# Patient Record
Sex: MALE | Race: WHITE | NOT HISPANIC OR LATINO | Employment: FULL TIME | URBAN - NONMETROPOLITAN AREA
[De-identification: names, ages, dates, MRNs, and addresses within clinical notes are randomized per-mention and may not be internally consistent; named-entity substitution may affect disease eponyms.]

---

## 2017-07-21 ENCOUNTER — OFFICE VISIT (OUTPATIENT)
Dept: CARDIOLOGY | Facility: CLINIC | Age: 57
End: 2017-07-21

## 2017-07-21 DIAGNOSIS — I25.10 CORONARY ARTERY DISEASE INVOLVING NATIVE CORONARY ARTERY OF NATIVE HEART WITHOUT ANGINA PECTORIS: ICD-10-CM

## 2017-07-21 DIAGNOSIS — I10 ESSENTIAL HYPERTENSION: Primary | ICD-10-CM

## 2017-07-21 PROCEDURE — 99213 OFFICE O/P EST LOW 20 MIN: CPT | Performed by: INTERNAL MEDICINE

## 2017-07-21 RX ORDER — FAMOTIDINE 40 MG/1
40 TABLET, FILM COATED ORAL 2 TIMES DAILY
COMMUNITY
End: 2021-12-27 | Stop reason: SDUPTHER

## 2017-07-21 RX ORDER — ERGOCALCIFEROL 1.25 MG/1
50000 CAPSULE ORAL WEEKLY
COMMUNITY
End: 2020-02-24

## 2017-07-21 RX ORDER — ROSUVASTATIN CALCIUM 20 MG/1
20 TABLET, COATED ORAL DAILY
COMMUNITY
End: 2018-07-16 | Stop reason: SDUPTHER

## 2017-07-21 NOTE — PROGRESS NOTES
Subjective:     Encounter Date:07/21/2017      Patient ID: Guy Sheth is a 57 y.o. male.    Chief Complaint:Coronary artery disease  HPI  This is a 57-year-old male patient who is followed chronically for hypertension and chronic coronary artery disease.  Since his last visit he has had no recurrence in chest discomfort or shortness of breath.  He has increased his overall physical activity levels and has been hiking on a regular basis with his wife.  He has no exertional chest arm neck jaw shoulder or back discomfort.  There is no shortness of breath at rest or with activity.  He has no orthopnea PND or lower extremity edema.  There is no dizziness palpitations or syncope.  The patient reports that he will occasionally get swelling in his feet and ankles if she's been on his feet for prolonged periods of time.  He remains a nonsmoker.  The remainder of his past medical history, family history and social history are unchanged compared to his last visit.  The following portions of the patient's history were reviewed and updated as appropriate: allergies, current medications, past family history, past medical history, past social history, past surgical history and problem  Review of Systems   Constitution: Negative for chills, diaphoresis, fever, weakness, malaise/fatigue, night sweats, weight gain and weight loss.   HENT: Negative for ear discharge, hearing loss, hoarse voice and nosebleeds.    Eyes: Negative for discharge, double vision, pain and photophobia.   Cardiovascular: Positive for leg swelling. Negative for chest pain, claudication, cyanosis, dyspnea on exertion, irregular heartbeat, near-syncope, orthopnea, palpitations, paroxysmal nocturnal dyspnea and syncope.   Respiratory: Negative for cough, hemoptysis, shortness of breath, sputum production and wheezing.    Endocrine: Negative for cold intolerance, heat intolerance, polydipsia, polyphagia and polyuria.   Hematologic/Lymphatic: Negative for  adenopathy and bleeding problem. Does not bruise/bleed easily.   Skin: Negative for color change, flushing, itching and rash.   Musculoskeletal: Negative for muscle cramps, muscle weakness, myalgias and stiffness.   Gastrointestinal: Negative for abdominal pain, diarrhea, hematemesis, hematochezia, nausea and vomiting.   Genitourinary: Negative for dysuria, frequency and nocturia.   Neurological: Negative for dizziness, focal weakness, light-headedness, loss of balance, numbness, paresthesias and seizures.   Psychiatric/Behavioral: Negative for altered mental status, hallucinations and suicidal ideas.   Allergic/Immunologic: Negative for HIV exposure, hives and persistent infections.       Current Outpatient Prescriptions:   •  ALPRAZolam (XANAX) 1 MG tablet, Take 1 mg by mouth 2 (Two) Times a Day As Needed for anxiety., Disp: , Rfl:   •  aspirin  MG tablet, Take 325 mg by mouth Daily., Disp: , Rfl:   •  famotidine (PEPCID) 40 MG tablet, Take 40 mg by mouth 2 (Two) Times a Day., Disp: , Rfl:   •  fluticasone (FLONASE) 50 MCG/ACT nasal spray, 2 sprays into each nostril Daily., Disp: , Rfl:   •  lisinopril (PRINIVIL,ZESTRIL) 10 MG tablet, Take 10 mg by mouth 2 (Two) Times a Day., Disp: , Rfl:   •  metaxalone (SKELAXIN) 800 MG tablet, Take 800 mg by mouth As Needed for muscle spasms., Disp: , Rfl:   •  metFORMIN (GLUCOPHAGE) 500 MG tablet, Take 500 mg by mouth 2 (Two) Times a Day With Meals., Disp: , Rfl:   •  NIFEdipine XL (PROCARDIA XL) 30 MG 24 hr tablet, Take 30 mg by mouth Daily., Disp: , Rfl:   •  pantoprazole (PROTONIX) 40 MG EC tablet, Take 40 mg by mouth Daily., Disp: , Rfl:   •  rosuvastatin (CRESTOR) 20 MG tablet, Take 20 mg by mouth Daily., Disp: , Rfl:   •  vitamin D (ERGOCALCIFEROL) 57408 UNITS capsule capsule, Take 50,000 Units by mouth 1 (One) Time Per Week., Disp: , Rfl:      Objective:     Physical Exam   Constitutional: He is oriented to person, place, and time. He appears well-developed and  well-nourished.   HENT:   Head: Normocephalic and atraumatic.   Eyes: Conjunctivae are normal. No scleral icterus.   Cardiovascular: Normal rate, regular rhythm, normal heart sounds and intact distal pulses.  Exam reveals no gallop and no friction rub.    No murmur heard.  Pulmonary/Chest: Effort normal and breath sounds normal. No respiratory distress.   Abdominal: Soft. Bowel sounds are normal. There is no tenderness.   Musculoskeletal: He exhibits no edema.   Neurological: He is alert and oriented to person, place, and time.   Skin: Skin is warm and dry.   Psychiatric: He has a normal mood and affect. His behavior is normal.     There were no vitals taken for this visit.   Lab Review:       Assessment:         1. Essential hypertension  Blood pressure control is excellent.    2. Coronary artery disease involving native coronary artery of native heart without angina pectoris  Stable and angina free.  Procedures     Plan:       No changes in his medication profile is indicated at this time.  No additional cardiovascular testing is warranted at this point.  The patient has been congratulated on his increased physical activity and is encouraged to continue an active lifestyle.

## 2017-08-25 ENCOUNTER — TELEPHONE (OUTPATIENT)
Dept: CARDIOLOGY | Facility: CLINIC | Age: 57
End: 2017-08-25

## 2017-08-25 NOTE — TELEPHONE ENCOUNTER
Patient seen Dr. Jay Bee today and he states that  Rohit told him that he should be on procardia because it is to dangerous.  Patient is concerned. Patient would like to talk to you about it.

## 2018-01-26 ENCOUNTER — OFFICE VISIT (OUTPATIENT)
Dept: CARDIOLOGY | Facility: CLINIC | Age: 58
End: 2018-01-26

## 2018-01-26 VITALS
BODY MASS INDEX: 30.61 KG/M2 | DIASTOLIC BLOOD PRESSURE: 88 MMHG | OXYGEN SATURATION: 97 % | WEIGHT: 226 LBS | SYSTOLIC BLOOD PRESSURE: 132 MMHG | HEIGHT: 72 IN | HEART RATE: 80 BPM

## 2018-01-26 DIAGNOSIS — I25.10 CORONARY ARTERY DISEASE INVOLVING NATIVE CORONARY ARTERY OF NATIVE HEART WITHOUT ANGINA PECTORIS: Primary | ICD-10-CM

## 2018-01-26 DIAGNOSIS — I10 ESSENTIAL HYPERTENSION: ICD-10-CM

## 2018-01-26 PROCEDURE — 99214 OFFICE O/P EST MOD 30 MIN: CPT | Performed by: INTERNAL MEDICINE

## 2018-01-26 RX ORDER — TEMAZEPAM 15 MG/1
15 CAPSULE ORAL
COMMUNITY
Start: 2018-01-19

## 2018-01-26 NOTE — PROGRESS NOTES
Subjective:     Encounter Date:01/26/2018      Patient ID: Guy Sheth is a 57 y.o. male.    Chief Complaint:Coronary artery disease  HPI  This is a 57-year-old male patient with known coronary artery disease who presents to cardiology clinic for routine follow-up.  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  He has had no recurrent chest discomfort at rest or with activity.  He has no shortness of breath at rest or with activity.  There is no orthopnea PND or lower extremity edema.  There is no palpitations or syncope.  He remains a nonsmoker.  The following portions of the patient's history were reviewed and updated as appropriate: allergies, current medications, past family history, past medical history, past social history, past surgical history and problem  Review of Systems   Constitution: Negative. Negative for chills, diaphoresis, fever, weakness, malaise/fatigue, night sweats, weight gain and weight loss.   HENT: Negative.  Negative for ear discharge, hearing loss, hoarse voice and nosebleeds.    Eyes: Negative.  Negative for discharge, double vision, pain and photophobia.   Cardiovascular: Negative.  Negative for chest pain, claudication, cyanosis, dyspnea on exertion, irregular heartbeat, leg swelling, near-syncope, orthopnea, palpitations, paroxysmal nocturnal dyspnea and syncope.   Respiratory: Negative.  Negative for cough, hemoptysis, shortness of breath, sputum production and wheezing.    Endocrine: Negative.  Negative for cold intolerance, heat intolerance, polydipsia, polyphagia and polyuria.   Hematologic/Lymphatic: Negative.  Negative for adenopathy and bleeding problem. Does not bruise/bleed easily.   Skin: Negative.  Negative for color change, flushing, itching and rash.   Musculoskeletal: Positive for joint pain and joint swelling. Negative for muscle cramps, muscle weakness, myalgias and stiffness.    Gastrointestinal: Negative.  Negative for abdominal pain, diarrhea, hematemesis, hematochezia, nausea and vomiting.   Genitourinary: Negative.  Negative for dysuria, frequency and nocturia.   Neurological: Positive for dizziness. Negative for focal weakness, loss of balance, numbness, paresthesias and seizures.   Psychiatric/Behavioral: Negative for altered mental status, hallucinations and suicidal ideas.   Allergic/Immunologic: Negative for HIV exposure, hives and persistent infections.       Current Outpatient Prescriptions:   •  aspirin  MG tablet, Take 325 mg by mouth Daily., Disp: , Rfl:   •  famotidine (PEPCID) 40 MG tablet, Take 40 mg by mouth 2 (Two) Times a Day., Disp: , Rfl:   •  fluticasone (FLONASE) 50 MCG/ACT nasal spray, 2 sprays into each nostril Daily., Disp: , Rfl:   •  lisinopril (PRINIVIL,ZESTRIL) 10 MG tablet, Take 20 mg by mouth 2 (Two) Times a Day., Disp: , Rfl:   •  metaxalone (SKELAXIN) 800 MG tablet, Take 800 mg by mouth As Needed for muscle spasms., Disp: , Rfl:   •  metFORMIN (GLUCOPHAGE) 500 MG tablet, Take 500 mg by mouth 2 (Two) Times a Day With Meals., Disp: , Rfl:   •  NIFEdipine XL (PROCARDIA XL) 30 MG 24 hr tablet, Take 30 mg by mouth Daily., Disp: , Rfl:   •  pantoprazole (PROTONIX) 40 MG EC tablet, Take 40 mg by mouth Daily., Disp: , Rfl:   •  rosuvastatin (CRESTOR) 20 MG tablet, Take 20 mg by mouth Daily., Disp: , Rfl:   •  temazepam (RESTORIL) 15 MG capsule, Take 15 mg by mouth 2 (Two) Times a Day., Disp: , Rfl:   •  vitamin D (ERGOCALCIFEROL) 38016 UNITS capsule capsule, Take 50,000 Units by mouth 1 (One) Time Per Week., Disp: , Rfl:      Objective:     Physical Exam   Constitutional: He is oriented to person, place, and time. He appears well-developed and well-nourished.   HENT:   Head: Normocephalic and atraumatic.   Eyes: Conjunctivae are normal. No scleral icterus.   Cardiovascular: Normal rate, regular rhythm, normal heart sounds and intact distal pulses.  Exam  "reveals no gallop and no friction rub.    No murmur heard.  Pulmonary/Chest: Effort normal and breath sounds normal. No respiratory distress.   Abdominal: Soft. Bowel sounds are normal. There is no tenderness.   Musculoskeletal: He exhibits no edema.   Neurological: He is alert and oriented to person, place, and time.   Skin: Skin is warm and dry.   Psychiatric: He has a normal mood and affect. His behavior is normal.     Blood pressure 132/88, pulse 80, height 182.9 cm (72.01\"), weight 103 kg (226 lb), SpO2 97 %.   Lab Review:       Assessment:         Coronary artery disease-native vessels.  Without angina pectoris.  The patient remained stable and angina free.    Essential hypertension.  Excellent blood pressure control.  The patient's primary care doctor has raised concerns about using long-acting nifedipine, implying that it was not a safe medication.  Procedures     Plan:       I have reassured the patient that Procardia XL is a safe medication.  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  No changes in his medication therapy are indicated at this time.  The patient is encouraged to continue his active lifestyle.         "

## 2018-02-21 ENCOUNTER — TELEPHONE (OUTPATIENT)
Dept: CARDIOLOGY | Facility: CLINIC | Age: 58
End: 2018-02-21

## 2018-02-21 NOTE — TELEPHONE ENCOUNTER
Wife states that patient went to see the reumotologist and was prescribed Prednisone and Leflunomide. Wife states patient's bp was running 160/111. Patient stopped taking both of these medication and taking an extra Lisinopril, bp is coming down to 147/99. Took 2 extra Lisinorpil on Sunday. Wife wanted to know what else to do.

## 2018-03-02 ENCOUNTER — OFFICE VISIT (OUTPATIENT)
Dept: CARDIOLOGY | Facility: CLINIC | Age: 58
End: 2018-03-02

## 2018-03-02 VITALS
DIASTOLIC BLOOD PRESSURE: 100 MMHG | HEART RATE: 79 BPM | BODY MASS INDEX: 30.2 KG/M2 | SYSTOLIC BLOOD PRESSURE: 150 MMHG | OXYGEN SATURATION: 99 % | HEIGHT: 72 IN | WEIGHT: 223 LBS

## 2018-03-02 DIAGNOSIS — I25.10 CORONARY ARTERY DISEASE INVOLVING NATIVE CORONARY ARTERY OF NATIVE HEART WITHOUT ANGINA PECTORIS: Primary | ICD-10-CM

## 2018-03-02 DIAGNOSIS — I10 ESSENTIAL HYPERTENSION: ICD-10-CM

## 2018-03-02 PROCEDURE — 99213 OFFICE O/P EST LOW 20 MIN: CPT | Performed by: INTERNAL MEDICINE

## 2018-03-02 RX ORDER — ASPIRIN 325 MG
1 TABLET ORAL DAILY
COMMUNITY
Start: 2014-06-13 | End: 2018-04-16

## 2018-03-02 RX ORDER — OLMESARTAN MEDOXOMIL / AMLODIPINE BESYLATE / HYDROCHLOROTHIAZIDE 40; 10; 12.5 MG/1; MG/1; MG/1
40 TABLET, FILM COATED ORAL DAILY
Qty: 30 TABLET | Refills: 11 | Status: SHIPPED | OUTPATIENT
Start: 2018-03-02 | End: 2018-04-16 | Stop reason: DRUGHIGH

## 2018-03-02 RX ORDER — LISINOPRIL 20 MG/1
1 TABLET ORAL 2 TIMES DAILY
COMMUNITY
Start: 2018-02-23 | End: 2018-03-02 | Stop reason: ALTCHOICE

## 2018-03-02 RX ORDER — IBUPROFEN 600 MG/1
1 TABLET ORAL DAILY PRN
COMMUNITY
Start: 2017-12-01 | End: 2019-01-11 | Stop reason: ALTCHOICE

## 2018-03-02 RX ORDER — AZELASTINE HYDROCHLORIDE 0.5 MG/ML
SOLUTION/ DROPS OPHTHALMIC
COMMUNITY
Start: 2018-01-20

## 2018-03-02 RX ORDER — LEFLUNOMIDE 20 MG/1
1 TABLET ORAL DAILY
COMMUNITY
Start: 2018-02-01 | End: 2020-02-24

## 2018-03-02 RX ORDER — NIFEDIPINE 30 MG/1
1 TABLET, EXTENDED RELEASE ORAL DAILY
COMMUNITY
Start: 2014-06-13 | End: 2018-03-02 | Stop reason: ALTCHOICE

## 2018-03-02 RX ORDER — METFORMIN HYDROCHLORIDE 500 MG/1
1 TABLET, EXTENDED RELEASE ORAL DAILY PRN
COMMUNITY
Start: 2018-01-11

## 2018-03-02 RX ORDER — PREDNISONE 1 MG/1
1 TABLET ORAL DAILY
COMMUNITY
Start: 2018-02-01 | End: 2018-03-02

## 2018-03-02 RX ORDER — FLUTICASONE PROPIONATE 50 MCG
1 SPRAY, SUSPENSION (ML) NASAL DAILY PRN
COMMUNITY
Start: 2014-06-13 | End: 2018-04-16

## 2018-03-02 RX ORDER — TOBRAMYCIN AND DEXAMETHASONE 3; 1 MG/ML; MG/ML
1 SUSPENSION/ DROPS OPHTHALMIC AS NEEDED
COMMUNITY
Start: 2018-01-30 | End: 2020-02-24

## 2018-03-02 NOTE — PROGRESS NOTES
Subjective:     Encounter Date:03/02/2018      Patient ID: Guy Sheth is a 58 y.o. male.    Chief Complaint:Hypertension  HPI  This is a 58-year-old male patient who has had poor blood pressure control after starting a new medication for rheumatoid arthritis- Arava, which has the side effect of hypertension.  The patient also was on a two-week course of prednisone which as made his blood pressure difficult to control.  He has no chest discomfort at rest or with activity.  There is no orthopnea PND or lower extremity edema.  There is no dizziness palpitations or syncope.  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  Wife feels that he has been anxious and nervous and agitated and that this is contributing to his elevated blood pressure.  The following portions of the patient's history were reviewed and updated as appropriate: allergies, current medications, past family history, past medical history, past social history, past surgical history and problem  Review of Systems   Constitution: Negative for chills, diaphoresis, fever, weakness, malaise/fatigue, night sweats, weight gain and weight loss.   HENT: Negative for ear discharge, hearing loss, hoarse voice and nosebleeds.    Eyes: Negative for discharge, double vision, pain and photophobia.   Cardiovascular: Negative for chest pain, claudication, cyanosis, dyspnea on exertion, irregular heartbeat, leg swelling, near-syncope, orthopnea, palpitations, paroxysmal nocturnal dyspnea and syncope.   Respiratory: Negative for cough, hemoptysis, shortness of breath, sputum production and wheezing.    Endocrine: Negative for cold intolerance, heat intolerance,  polydipsia, polyphagia and polyuria.   Hematologic/Lymphatic: Negative for adenopathy and bleeding problem. Does not bruise/bleed easily.   Skin: Negative for color change, flushing, itching and rash.   Musculoskeletal: Negative for muscle cramps, muscle weakness, myalgias and stiffness.   Gastrointestinal: Negative for abdominal pain, diarrhea, hematemesis, hematochezia, nausea and vomiting.   Genitourinary: Negative for dysuria, frequency and nocturia.   Neurological: Negative for focal weakness, loss of balance, numbness, paresthesias and seizures.   Psychiatric/Behavioral: Negative for altered mental status, hallucinations and suicidal ideas.   Allergic/Immunologic: Negative for HIV exposure, hives and persistent infections.     I have reviewed the patient's medication profile and discussed these with the patient.  I have reviewed his last round of cardiac tests.  I reviewed his laboratory data.      Current Outpatient Prescriptions:   •  aspirin 325 MG tablet, Take 1 tablet by mouth Daily., Disp: , Rfl:   •  aspirin  MG tablet, Take 325 mg by mouth Daily., Disp: , Rfl:   •  azelastine (OPTIVAR) 0.05 % ophthalmic solution, , Disp: , Rfl:   •  famotidine (PEPCID) 40 MG tablet, Take 40 mg by mouth 2 (Two) Times a Day., Disp: , Rfl:   •  fluticasone (FLONASE) 50 MCG/ACT nasal spray, 2 sprays into each nostril Daily., Disp: , Rfl:   •  fluticasone (FLONASE) 50 MCG/ACT nasal spray, 1 spray into each nostril Daily As Needed., Disp: , Rfl:   •  ibuprofen (ADVIL,MOTRIN) 600 MG tablet, Take 1 tablet by mouth Daily As Needed., Disp: , Rfl:   •  leflunomide (ARAVA) 20 MG tablet, Take 1 tablet by mouth Daily., Disp: , Rfl:   •  metaxalone (SKELAXIN) 800 MG tablet, Take 800 mg by mouth As Needed for muscle spasms., Disp: , Rfl:   •  metFORMIN (GLUCOPHAGE) 500 MG tablet, Take 500 mg by mouth 2 (Two) Times a Day With Meals., Disp: , Rfl:   •  metFORMIN ER (GLUCOPHAGE-XR) 500 MG 24 hr tablet, Take 1 tablet by mouth Daily  "As Needed., Disp: , Rfl:   •  pantoprazole (PROTONIX) 40 MG EC tablet, Take 40 mg by mouth Daily., Disp: , Rfl:   •  rosuvastatin (CRESTOR) 20 MG tablet, Take 20 mg by mouth Daily., Disp: , Rfl:   •  temazepam (RESTORIL) 15 MG capsule, Take 15 mg by mouth 2 (Two) Times a Day., Disp: , Rfl:   •  tobramycin-dexamethasone (TOBRADEX) 0.3-0.1 % ophthalmic suspension, Administer 1 drop to both eyes As Needed., Disp: , Rfl:   •  vitamin D (ERGOCALCIFEROL) 22398 UNITS capsule capsule, Take 50,000 Units by mouth 1 (One) Time Per Week., Disp: , Rfl:   •  Olmesartan-Amlodipine-HCTZ 40-10-12.5 MG tablet, Take 40 mg by mouth Daily., Disp: 30 tablet, Rfl: 11     Objective:     Physical Exam   Constitutional: He is oriented to person, place, and time. He appears well-developed and well-nourished.   HENT:   Head: Normocephalic and atraumatic.   Eyes: Conjunctivae are normal. No scleral icterus.   Cardiovascular: Normal rate, regular rhythm, normal heart sounds and intact distal pulses.  Exam reveals no gallop and no friction rub.    No murmur heard.  Pulmonary/Chest: Effort normal and breath sounds normal. No respiratory distress.   Abdominal: Soft. Bowel sounds are normal. There is no tenderness.   Musculoskeletal: He exhibits no edema.   Neurological: He is alert and oriented to person, place, and time.   Skin: Skin is warm and dry.   Psychiatric: He has a normal mood and affect. His behavior is normal.     Blood pressure 150/100, pulse 79, height 182.9 cm (72.01\"), weight 101 kg (223 lb), SpO2 99 %.   Lab Review:       Assessment:         1. Coronary artery disease involving native coronary artery of native heart without angina pectoris  Stable and angina free.    2. Essential hypertension  Poor blood pressure control.  I suspect there are a lot of secondary issues contributing to his elevated blood pressure.    Procedures     Plan:       I have recommended discontinuation of lisinopril as well as Procardia XL.  The patient has " been started on Tribenzor 40-10-12.5 mg orally once per day.  He has been given a coupon discount card as well.  This is valid for one year.  The patient has been counseled regarding the importance of managing his anxiety and emotions.  No additional cardiac testing is necessary from my standpoint.  The patient has been counseled regarding the importance of maintaining an active lifestyle.  The patient has been counseled regarding the importance of dietary sodium restriction.

## 2018-03-06 ENCOUNTER — TELEPHONE (OUTPATIENT)
Dept: CARDIOLOGY | Facility: CLINIC | Age: 58
End: 2018-03-06

## 2018-03-06 RX ORDER — BISOPROLOL FUMARATE 10 MG/1
10 TABLET, FILM COATED ORAL DAILY
Qty: 30 TABLET | Refills: 5 | Status: SHIPPED | OUTPATIENT
Start: 2018-03-06 | End: 2018-04-16

## 2018-03-06 NOTE — TELEPHONE ENCOUNTER
Patient states that he took the olmesartan and procardia last night and his bp went up to  142/101. T Last bp was 129/88 @6:52am.  Also hr was 102.  Please advise.

## 2018-03-27 ENCOUNTER — TELEPHONE (OUTPATIENT)
Dept: CARDIOLOGY | Facility: CLINIC | Age: 58
End: 2018-03-27

## 2018-03-27 NOTE — TELEPHONE ENCOUNTER
Patient was started on bisoprolol 10 mg daily on 03/06. He takes it at nights. States that his hr goes to down to 50 and when his bp is 148-153/90's every morning. Patient states it makes him feel like he's dragging.

## 2018-04-16 ENCOUNTER — OFFICE VISIT (OUTPATIENT)
Dept: CARDIOLOGY | Facility: CLINIC | Age: 58
End: 2018-04-16

## 2018-04-16 VITALS
HEIGHT: 72 IN | DIASTOLIC BLOOD PRESSURE: 76 MMHG | OXYGEN SATURATION: 99 % | SYSTOLIC BLOOD PRESSURE: 118 MMHG | WEIGHT: 228 LBS | HEART RATE: 80 BPM | BODY MASS INDEX: 30.88 KG/M2

## 2018-04-16 DIAGNOSIS — I10 ESSENTIAL HYPERTENSION: Primary | ICD-10-CM

## 2018-04-16 DIAGNOSIS — I25.10 CORONARY ARTERY DISEASE INVOLVING NATIVE CORONARY ARTERY OF NATIVE HEART WITHOUT ANGINA PECTORIS: ICD-10-CM

## 2018-04-16 PROCEDURE — 99213 OFFICE O/P EST LOW 20 MIN: CPT | Performed by: INTERNAL MEDICINE

## 2018-04-16 RX ORDER — OLMESARTAN MEDOXOMIL, AMLODIPINE AND HYDROCHLOROTHIAZIDE TABLET 20/5/12.5 MG 20; 5; 12.5 MG/1; MG/1; MG/1
1 TABLET ORAL DAILY
Qty: 30 TABLET | Refills: 11 | Status: SHIPPED | OUTPATIENT
Start: 2018-04-16 | End: 2018-05-07 | Stop reason: SDUPTHER

## 2018-04-16 RX ORDER — CLONIDINE HYDROCHLORIDE 0.1 MG/1
0.1 TABLET ORAL EVERY 8 HOURS PRN
Qty: 40 TABLET | Refills: 5 | Status: SHIPPED | OUTPATIENT
Start: 2018-04-16 | End: 2020-03-18 | Stop reason: SDUPTHER

## 2018-04-16 RX ORDER — CLONIDINE HYDROCHLORIDE 0.1 MG/1
0.1 TABLET ORAL EVERY 12 HOURS SCHEDULED
Status: CANCELLED | OUTPATIENT
Start: 2018-04-16

## 2018-04-16 RX ORDER — AMOXICILLIN 500 MG/1
2 CAPSULE ORAL DAILY
COMMUNITY
Start: 2018-04-09 | End: 2018-07-16

## 2018-04-16 NOTE — PROGRESS NOTES
Subjective:     Encounter Date:04/16/2018      Patient ID: Guy Sheth is a 58 y.o. male.    Chief Complaint:Hypertension  HPI  This is a 58-year-old male patient who recently had change in his medication.  The patient discontinued his lisinopril and Procardia XL.  In its place he was started on tribenzor with bisoprolol.  He discontinued the beta blocker several weeks ago due to side effects and symptomatic bradycardia.  His heart rate has been normal since then.  The patient has had to hold his Tribenzor for a few doses due to low blood pressure-90/60.  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  He is due to have a occupation-related treadmill stress test in July of this year.  He has no chest discomfort at rest or with activity.  There is no shortness of breath at rest or with activity.  There is no orthopnea PND or lower extremity edema.  He has no dizziness palpitations or syncope.  He remains a nonsmoker.  The following portions of the patient's history were reviewed and updated as appropriate: allergies, current medications, past family history, past medical history, past social history, past surgical history and problem  Review of Systems   Constitution: Negative for chills, diaphoresis, fever, weakness, malaise/fatigue, weight gain and weight loss.   HENT: Negative for ear discharge, hearing loss, hoarse voice and nosebleeds.    Eyes: Negative for discharge, double vision, pain and photophobia.   Cardiovascular: Negative for chest pain, claudication, cyanosis, dyspnea on exertion, irregular heartbeat, leg swelling, near-syncope, orthopnea, palpitations, paroxysmal nocturnal dyspnea and syncope.   Respiratory: Negative for cough, hemoptysis, shortness of breath, sputum production and wheezing.    Endocrine: Negative for cold intolerance, heat intolerance, polydipsia, polyphagia and polyuria.   Hematologic/Lymphatic: Negative for adenopathy and bleeding problem. Does  not bruise/bleed easily.   Skin: Negative for color change, flushing, itching and rash.   Musculoskeletal: Negative for muscle cramps, muscle weakness, myalgias and stiffness.   Gastrointestinal: Negative for abdominal pain, diarrhea, hematemesis, hematochezia, nausea and vomiting.   Genitourinary: Negative for dysuria, frequency and nocturia.   Neurological: Negative for focal weakness, loss of balance, numbness, paresthesias and seizures.   Psychiatric/Behavioral: Negative for altered mental status, hallucinations and suicidal ideas.   Allergic/Immunologic: Negative for HIV exposure, hives and persistent infections.           Current Outpatient Prescriptions:   •  amoxicillin (AMOXIL) 500 MG capsule, Take 2 capsules by mouth Daily., Disp: , Rfl:   •  aspirin  MG tablet, Take 325 mg by mouth Daily., Disp: , Rfl:   •  azelastine (OPTIVAR) 0.05 % ophthalmic solution, , Disp: , Rfl:   •  famotidine (PEPCID) 40 MG tablet, Take 40 mg by mouth 2 (Two) Times a Day., Disp: , Rfl:   •  ibuprofen (ADVIL,MOTRIN) 600 MG tablet, Take 1 tablet by mouth Daily As Needed., Disp: , Rfl:   •  leflunomide (ARAVA) 20 MG tablet, Take 1 tablet by mouth Daily., Disp: , Rfl:   •  metaxalone (SKELAXIN) 800 MG tablet, Take 800 mg by mouth As Needed for muscle spasms., Disp: , Rfl:   •  metFORMIN ER (GLUCOPHAGE-XR) 500 MG 24 hr tablet, Take 1 tablet by mouth Daily As Needed., Disp: , Rfl:   •  pantoprazole (PROTONIX) 40 MG EC tablet, Take 40 mg by mouth Daily., Disp: , Rfl:   •  rosuvastatin (CRESTOR) 20 MG tablet, Take 20 mg by mouth Daily., Disp: , Rfl:   •  temazepam (RESTORIL) 15 MG capsule, Take 15 mg by mouth 2 (Two) Times a Day., Disp: , Rfl:   •  tobramycin-dexamethasone (TOBRADEX) 0.3-0.1 % ophthalmic suspension, Administer 1 drop to both eyes As Needed., Disp: , Rfl:   •  vitamin D (ERGOCALCIFEROL) 73896 UNITS capsule capsule, Take 50,000 Units by mouth 1 (One) Time Per Week., Disp: , Rfl:      Objective:     Physical Exam  "  Constitutional: He is oriented to person, place, and time. He appears well-developed and well-nourished.   HENT:   Head: Normocephalic and atraumatic.   Eyes: Conjunctivae are normal. No scleral icterus.   Neck: No JVD present.   Cardiovascular: Normal rate, regular rhythm, normal heart sounds and intact distal pulses.  Exam reveals no gallop and no friction rub.    No murmur heard.  Pulmonary/Chest: Effort normal and breath sounds normal. No respiratory distress.   Abdominal: Soft. Bowel sounds are normal. There is no tenderness.   Musculoskeletal: He exhibits no edema.   Neurological: He is alert and oriented to person, place, and time.   Skin: Skin is warm and dry. No rash noted.   Psychiatric: He has a normal mood and affect. His behavior is normal.     Blood pressure 118/76, pulse 80, height 182.9 cm (72.01\"), weight 103 kg (228 lb), SpO2 99 %.   Lab Review:       Assessment:         1. Essential hypertension  Excellent blood pressure control.    2. Coronary artery disease involving native coronary artery of native heart without angina pectoris  Stable and angina free.    Procedures     Plan:       I have recommended decreasing the dose of his Tribenzor 20/5/12.5 orally once per day.  His beta blocker has been discontinued.  He has been given a prescription for clonidine 0.1 mg orally every 8 hours as needed for systolic blood pressure greater than 150 and/or a diastolic blood pressure greater than 90.  He is been counseled regarding the importance of dietary sodium restriction.  The importance of diet exercise and weight management have been reinforced with the patient.  He will follow-up in July as previously scheduled for his occupational mandated treadmill stress test.         "

## 2018-05-07 ENCOUNTER — TELEPHONE (OUTPATIENT)
Dept: CARDIOLOGY | Facility: CLINIC | Age: 58
End: 2018-05-07

## 2018-05-07 RX ORDER — OLMESARTAN MEDOXOMIL, AMLODIPINE AND HYDROCHLOROTHIAZIDE TABLET 20/5/12.5 MG 20; 5; 12.5 MG/1; MG/1; MG/1
1 TABLET ORAL 2 TIMES DAILY
Qty: 180 TABLET | Refills: 3 | Status: SHIPPED | OUTPATIENT
Start: 2018-05-07 | End: 2019-08-21 | Stop reason: SDUPTHER

## 2018-05-07 NOTE — TELEPHONE ENCOUNTER
Patient concerned about BP meds and how to take them. Patient had been using both Tribenzor doses 40-10-12.5 & 20-5-12.5 1 in am and 1 in pm but sometimes used the 20-5-12.5 bid instead. He took Clonidine this am for /93 before taking Tribenzor.  I spoke with Dr. Brambila and he states to either do Tribenzor 40-10-12.5 QD or Tribenzor 20-5-12.5 BID (12 hours apart) and Clonidine prn with /90 or >.  Patient will that Tribenzor BID for a more consistent BP readings.New Rx sent in to [facility]

## 2018-07-16 ENCOUNTER — OFFICE VISIT (OUTPATIENT)
Dept: CARDIOLOGY | Facility: CLINIC | Age: 58
End: 2018-07-16

## 2018-07-16 VITALS
WEIGHT: 226 LBS | RESPIRATION RATE: 18 BRPM | OXYGEN SATURATION: 100 % | BODY MASS INDEX: 30.61 KG/M2 | HEART RATE: 65 BPM | HEIGHT: 72 IN | DIASTOLIC BLOOD PRESSURE: 82 MMHG | SYSTOLIC BLOOD PRESSURE: 138 MMHG

## 2018-07-16 DIAGNOSIS — I25.10 CORONARY ARTERY DISEASE INVOLVING NATIVE CORONARY ARTERY OF NATIVE HEART WITHOUT ANGINA PECTORIS: ICD-10-CM

## 2018-07-16 DIAGNOSIS — I10 ESSENTIAL HYPERTENSION: Primary | ICD-10-CM

## 2018-07-16 LAB
BH CV STRESS BP STAGE 1: NORMAL
BH CV STRESS BP STAGE 2: NORMAL
BH CV STRESS BP STAGE 3: NORMAL
BH CV STRESS DURATION MIN STAGE 1: 3
BH CV STRESS DURATION MIN STAGE 2: 3
BH CV STRESS DURATION MIN STAGE 3: 3
BH CV STRESS DURATION MIN STAGE 4: 3
BH CV STRESS DURATION SEC STAGE 1: 0
BH CV STRESS DURATION SEC STAGE 2: 0
BH CV STRESS DURATION SEC STAGE 3: 0
BH CV STRESS DURATION SEC STAGE 4: 0
BH CV STRESS GRADE STAGE 1: 10
BH CV STRESS GRADE STAGE 2: 12
BH CV STRESS GRADE STAGE 3: 14
BH CV STRESS GRADE STAGE 4: 16
BH CV STRESS HR STAGE 1: 114
BH CV STRESS HR STAGE 2: 131
BH CV STRESS HR STAGE 3: 161
BH CV STRESS HR STAGE 4: 165
BH CV STRESS METS STAGE 1: 5
BH CV STRESS METS STAGE 2: 7.5
BH CV STRESS METS STAGE 3: 10
BH CV STRESS METS STAGE 4: 13.5
BH CV STRESS O2 STAGE 1: 98
BH CV STRESS O2 STAGE 2: 99
BH CV STRESS O2 STAGE 3: 99
BH CV STRESS O2 STAGE 4: 98
BH CV STRESS PROTOCOL 1: NORMAL
BH CV STRESS RECOVERY BP: NORMAL MMHG
BH CV STRESS RECOVERY HR: 103 BPM
BH CV STRESS RECOVERY O2: 99 %
BH CV STRESS SPEED STAGE 1: 1.7
BH CV STRESS SPEED STAGE 2: 2.5
BH CV STRESS SPEED STAGE 3: 3.4
BH CV STRESS SPEED STAGE 4: 4.2
BH CV STRESS STAGE 1: 1
BH CV STRESS STAGE 2: 2
BH CV STRESS STAGE 3: 3
BH CV STRESS STAGE 4: 4
MAXIMAL PREDICTED HEART RATE: 162 BPM
PERCENT MAX PREDICTED HR: 101.85 %
STRESS BASELINE BP: NORMAL MMHG
STRESS BASELINE HR: 65 BPM
STRESS O2 SAT REST: 100 %
STRESS PERCENT HR: 120 %
STRESS POST ESTIMATED WORKLOAD: 10.1 METS
STRESS POST EXERCISE DUR MIN: 9 MIN
STRESS POST O2 SAT PEAK: 99 %
STRESS POST PEAK BP: NORMAL MMHG
STRESS POST PEAK HR: 165 BPM
STRESS TARGET HR: 138 BPM

## 2018-07-16 PROCEDURE — 99213 OFFICE O/P EST LOW 20 MIN: CPT | Performed by: INTERNAL MEDICINE

## 2018-07-16 RX ORDER — ROSUVASTATIN CALCIUM 20 MG/1
20 TABLET, COATED ORAL DAILY
Qty: 90 TABLET | Refills: 4 | Status: SHIPPED | OUTPATIENT
Start: 2018-07-16 | End: 2019-09-19 | Stop reason: SDUPTHER

## 2018-07-16 NOTE — PROGRESS NOTES
Subjective:     Encounter Date:07/16/2018      Patient ID: Guy Sheth is a 58 y.o. male.    Chief Complaint:Coronary artery disease  HPI  This is a 58-year-old male patient who presents to cardiology clinic for routine follow-up.  The patient earlier today underwent a treadmill exercise stress test as part of his 's license requirement.  The patient exercised for over 9 minutes achieving his target heart rate without chest discomfort shortness of breath or ischemic EKG changes.  His heart rate and blood pressure response to exercise was normal and there was no exercise-induced arrhythmia.  He achieved greater than 10 metabolic equivalents of task.  He indicates that he has done very well since his last visit.  He indicates that he was previously tolerating her and name Crestor well without side effects.  However it was mandated to his insurance that he change to a generic form of Crestor.  This resulted in severe myalgias affecting the muscles of his arms and legs.  He indicates that he felt that the muscle was being torn from the bone.  The muscle pain was so intense that he had to discontinue taking cholesterol-lowering medication.  His myalgias resolved after discontinuation of the generic form of Crestor.  He requests brand-name Crestor.  The patient has no chest discomfort at rest or with activity.  There is no exertional chest arm neck jaw shoulder or back discomfort.  There is no orthopnea PND or lower extremity edema.  There is no dizziness palpitations or syncope.  He remains a nonsmoker.  The following portions of the patient's history were reviewed and updated as appropriate: allergies, current medications, past family history, past medical history, past social history, past surgical history and problem  Review of Systems   Constitution: Negative for chills, diaphoresis, fever, malaise/fatigue, weight gain and weight loss.   HENT: Negative for ear discharge, hearing loss, hoarse  voice and nosebleeds.    Eyes: Negative for discharge, double vision, pain and photophobia.   Cardiovascular: Negative for chest pain, claudication, cyanosis, dyspnea on exertion, irregular heartbeat, leg swelling, near-syncope, orthopnea, palpitations, paroxysmal nocturnal dyspnea and syncope.   Respiratory: Negative for cough, hemoptysis, shortness of breath, sputum production and wheezing.    Endocrine: Negative for cold intolerance, heat intolerance, polydipsia, polyphagia and polyuria.   Hematologic/Lymphatic: Negative for adenopathy and bleeding problem. Does not bruise/bleed easily.   Skin: Negative for color change, flushing, itching and rash.   Musculoskeletal: Positive for myalgias. Negative for muscle cramps, muscle weakness and stiffness.   Gastrointestinal: Negative for abdominal pain, diarrhea, hematemesis, hematochezia, nausea and vomiting.   Genitourinary: Negative for dysuria, frequency and nocturia.   Neurological: Negative for focal weakness, loss of balance, numbness, paresthesias and seizures.   Psychiatric/Behavioral: Negative for altered mental status, hallucinations and suicidal ideas.   Allergic/Immunologic: Negative for HIV exposure, hives and persistent infections.           Current Outpatient Prescriptions:   •  aspirin  MG tablet, Take 325 mg by mouth Daily., Disp: , Rfl:   •  azelastine (OPTIVAR) 0.05 % ophthalmic solution, , Disp: , Rfl:   •  CloNIDine (CATAPRES) 0.1 MG tablet, Take 1 tablet by mouth Every 8 (Eight) Hours As Needed for High Blood Pressure. SBP>150 MM/HG & OR DBP> 95 MM/HG, Disp: 40 tablet, Rfl: 5  •  famotidine (PEPCID) 40 MG tablet, Take 40 mg by mouth 2 (Two) Times a Day., Disp: , Rfl:   •  ibuprofen (ADVIL,MOTRIN) 600 MG tablet, Take 1 tablet by mouth Daily As Needed., Disp: , Rfl:   •  leflunomide (ARAVA) 20 MG tablet, Take 1 tablet by mouth Daily., Disp: , Rfl:   •  metaxalone (SKELAXIN) 800 MG tablet, Take 800 mg by mouth As Needed for muscle spasms., Disp:  , Rfl:   •  metFORMIN ER (GLUCOPHAGE-XR) 500 MG 24 hr tablet, Take 1 tablet by mouth Daily As Needed., Disp: , Rfl:   •  Olmesartan-Amlodipine-HCTZ 20-5-12.5 MG tablet, Take 1 tablet by mouth 2 (Two) Times a Day., Disp: 180 tablet, Rfl: 3  •  pantoprazole (PROTONIX) 40 MG EC tablet, Take 40 mg by mouth Daily., Disp: , Rfl:   •  rosuvastatin (CRESTOR) 20 MG tablet, Take 1 tablet by mouth Daily. Brand name only! Severe myalgia with generic, Disp: 90 tablet, Rfl: 4  •  temazepam (RESTORIL) 15 MG capsule, Take 15 mg by mouth 2 (Two) Times a Day., Disp: , Rfl:   •  tobramycin-dexamethasone (TOBRADEX) 0.3-0.1 % ophthalmic suspension, Administer 1 drop to both eyes As Needed., Disp: , Rfl:   •  vitamin D (ERGOCALCIFEROL) 72228 UNITS capsule capsule, Take 50,000 Units by mouth 1 (One) Time Per Week., Disp: , Rfl:      Objective:     Physical Exam   Constitutional: He is oriented to person, place, and time. He appears well-developed and well-nourished.   HENT:   Head: Normocephalic and atraumatic.   Mouth/Throat: Oropharynx is clear and moist.   Eyes: Conjunctivae and EOM are normal. Pupils are equal, round, and reactive to light. No scleral icterus.   Neck: Normal range of motion. Neck supple. No JVD present. No tracheal deviation present. No thyromegaly present.   Cardiovascular: Normal rate, regular rhythm, S1 normal, S2 normal, normal heart sounds, intact distal pulses and normal pulses.  PMI is not displaced.  Exam reveals no gallop and no friction rub.    No murmur heard.  Pulmonary/Chest: Effort normal and breath sounds normal. No respiratory distress. He has no wheezes. He has no rales.   Abdominal: Soft. Bowel sounds are normal. He exhibits no distension and no mass. There is no tenderness. There is no rebound and no guarding.   Musculoskeletal: Normal range of motion. He exhibits no edema or deformity.   Neurological: He is alert and oriented to person, place, and time. He displays normal reflexes. No cranial nerve  "deficit. Coordination normal.   Skin: Skin is warm and dry. No rash noted. No erythema.   Psychiatric: He has a normal mood and affect. His behavior is normal. Thought content normal.     Blood pressure 138/82, pulse 65, resp. rate 18, height 182.9 cm (72\"), weight 103 kg (226 lb), SpO2 100 %.   Lab Review:       Assessment:         1. Essential hypertension  Excellent blood pressure control.  The patient has done exceedingly well since starting Tribenzor.    2. Coronary artery disease involving native coronary artery of native heart without angina pectoris  Stable and angina free.  The patient requires statin based cholesterol-lowering therapy.  Given his severe myalgias with generic forms of medication it is essential that the patient take brand name Crestor.    Procedures     Plan:       We have requested preauthorization from his insurance company for brand name Crestor.  In my opinion the patient is not a candidate for generic statin based cholesterol-lowering therapy.  In my opinion it is medically necessary for the patient to use brand name Crestor.  No additional cardiovascular testing is indicated at this time.         "

## 2018-10-15 ENCOUNTER — TELEPHONE (OUTPATIENT)
Dept: CARDIOLOGY | Facility: CLINIC | Age: 58
End: 2018-10-15

## 2018-10-15 NOTE — TELEPHONE ENCOUNTER
Patient called to let us know his insurance had changed and Tribenzor may need another PA done. Patient emailed New card and I first called the pharmacy which states is goes through for $15.00 copay.  I tried to call patient to let them know.  Benita Goodwin MA

## 2019-01-11 ENCOUNTER — OFFICE VISIT (OUTPATIENT)
Dept: CARDIOLOGY | Facility: CLINIC | Age: 59
End: 2019-01-11

## 2019-01-11 VITALS
DIASTOLIC BLOOD PRESSURE: 82 MMHG | HEIGHT: 72 IN | OXYGEN SATURATION: 98 % | SYSTOLIC BLOOD PRESSURE: 152 MMHG | BODY MASS INDEX: 29.66 KG/M2 | HEART RATE: 68 BPM | WEIGHT: 219 LBS | RESPIRATION RATE: 18 BRPM

## 2019-01-11 DIAGNOSIS — I10 ESSENTIAL HYPERTENSION: ICD-10-CM

## 2019-01-11 DIAGNOSIS — I65.22 CAROTID ARTERY CALCIFICATION, LEFT: ICD-10-CM

## 2019-01-11 DIAGNOSIS — I25.10 CORONARY ARTERY DISEASE INVOLVING NATIVE CORONARY ARTERY OF NATIVE HEART WITHOUT ANGINA PECTORIS: Primary | ICD-10-CM

## 2019-01-11 PROCEDURE — 99214 OFFICE O/P EST MOD 30 MIN: CPT | Performed by: INTERNAL MEDICINE

## 2019-01-11 RX ORDER — METHOCARBAMOL 750 MG/1
TABLET, FILM COATED ORAL AS NEEDED
COMMUNITY
Start: 2019-01-05 | End: 2022-12-12

## 2019-01-11 RX ORDER — IBUPROFEN 800 MG/1
TABLET ORAL
COMMUNITY
Start: 2019-01-05 | End: 2020-02-24
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
## 2019-02-04 ENCOUNTER — TELEPHONE (OUTPATIENT)
Dept: CARDIOLOGY | Facility: CLINIC | Age: 59
End: 2019-02-04

## 2019-02-04 ENCOUNTER — HOSPITAL ENCOUNTER (OUTPATIENT)
Dept: ULTRASOUND IMAGING | Facility: HOSPITAL | Age: 59
Discharge: HOME | End: 2019-02-04
Attending: INTERNAL MEDICINE | Admitting: INTERNAL MEDICINE

## 2019-02-04 PROCEDURE — 93880 EXTRACRANIAL BILAT STUDY: CPT

## 2019-02-04 NOTE — TELEPHONE ENCOUNTER
----- Message from Kalpesh Brambila MD sent at 2/4/2019 12:57 PM EST -----  Let him know that this was fine.

## 2019-08-21 RX ORDER — OLMESARTAN MEDOXOMIL, AMLODIPINE AND HYDROCHLOROTHIAZIDE TABLET 20/5/12.5 MG 20; 5; 12.5 MG/1; MG/1; MG/1
TABLET ORAL
Qty: 180 TABLET | Refills: 3 | Status: SHIPPED | OUTPATIENT
Start: 2019-08-21 | End: 2020-08-24

## 2019-08-27 ENCOUNTER — TELEPHONE (OUTPATIENT)
Dept: CARDIOLOGY | Facility: CLINIC | Age: 59
End: 2019-08-27

## 2019-09-19 RX ORDER — ROSUVASTATIN CALCIUM 20 MG/1
TABLET, FILM COATED ORAL
Qty: 90 TABLET | Refills: 3 | OUTPATIENT
Start: 2019-09-19 | End: 2020-12-17

## 2019-09-19 NOTE — TELEPHONE ENCOUNTER
Fahaydee recieved from [facility] pharmacy for refill on Crestor 20mg. Refill sent to pharmacy.

## 2020-01-10 ENCOUNTER — TELEPHONE (OUTPATIENT)
Dept: CARDIOLOGY | Facility: CLINIC | Age: 60
End: 2020-01-10

## 2020-02-24 ENCOUNTER — OFFICE VISIT (OUTPATIENT)
Dept: CARDIOLOGY | Facility: CLINIC | Age: 60
End: 2020-02-24

## 2020-02-24 VITALS
DIASTOLIC BLOOD PRESSURE: 72 MMHG | HEART RATE: 64 BPM | HEIGHT: 72 IN | WEIGHT: 221 LBS | OXYGEN SATURATION: 98 % | SYSTOLIC BLOOD PRESSURE: 114 MMHG | BODY MASS INDEX: 29.93 KG/M2

## 2020-02-24 DIAGNOSIS — I10 ESSENTIAL HYPERTENSION: ICD-10-CM

## 2020-02-24 DIAGNOSIS — E78.5 DYSLIPIDEMIA: ICD-10-CM

## 2020-02-24 DIAGNOSIS — I65.22 CAROTID ARTERY CALCIFICATION, LEFT: ICD-10-CM

## 2020-02-24 DIAGNOSIS — I25.10 CORONARY ARTERY DISEASE INVOLVING NATIVE CORONARY ARTERY OF NATIVE HEART WITHOUT ANGINA PECTORIS: Primary | ICD-10-CM

## 2020-02-24 PROCEDURE — 99213 OFFICE O/P EST LOW 20 MIN: CPT | Performed by: NURSE PRACTITIONER

## 2020-02-24 RX ORDER — COLCHICINE 0.6 MG/1
0.6 TABLET ORAL DAILY PRN
COMMUNITY
Start: 2020-01-17

## 2020-02-24 RX ORDER — FEXOFENADINE HCL 180 MG/1
180 TABLET ORAL DAILY PRN
COMMUNITY
Start: 2019-12-13

## 2020-02-24 RX ORDER — BUSPIRONE HYDROCHLORIDE 10 MG/1
10 TABLET ORAL 2 TIMES DAILY
COMMUNITY
Start: 2020-01-17

## 2020-02-24 NOTE — PROGRESS NOTES
"Gyu Sheth is a 59 y.o. male.  MRN #: [id number]    Referring Provider: Lacey DODGE    Chief Complaint:   Chief Complaint   Patient presents with   • Follow-up   • Coronary Artery Disease   • Hypertension        History of Present Illness:  Mr Sheth is a 59-year-old gentleman who presents for his one-year cardiac follow-up.  Patient has a history of CAD with myocardial infarction 17 years ago with subsequent three-vessel CABG.  Patient has history of elevated lipids, hypertension, diabetes mellitus type 2 that is controlled with metformin.  He presents today with no symptoms of chest pain, chest palpitations, chest pressure, diaphoresis, unusual shortness of breath.  Patient is very compliant with diet maintaining a healthy heart diet with no added salt, he is a non-smoker and he has been smoke-free for 17 years.  He maintains a \"fairly active lifestyle\".  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    The patient presents today with their wife who contributes to the history of their care.     The following portions of the patient's history were reviewed and updated as appropriate: allergies, current medications, past family history, past medical history, past social history, past surgical history and problem list.     Review of Systems:     Review of Systems   Constitutional: Negative.  Negative for activity change, appetite change, diaphoresis, fatigue, unexpected weight gain and unexpected weight loss.   HENT: Negative.    Eyes: Negative.  Negative for blurred vision, double vision, photophobia and visual disturbance.   Respiratory: Negative.  Negative for apnea, cough, chest tightness, shortness of breath and wheezing.    Cardiovascular: Negative.  Negative for chest pain, palpitations and leg swelling.        History of CAD with non-Q wave MI 13 years ago with subsequent three-vessel CABG.   Gastrointestinal: Negative.  " Negative for abdominal distention, abdominal pain, blood in stool, nausea, vomiting, GERD and indigestion.   Endocrine: Negative.  Negative for cold intolerance, heat intolerance, polydipsia, polyphagia and polyuria.   Genitourinary: Negative.  Negative for decreased libido, frequency, genital sores, hematuria and urgency.   Musculoskeletal: Negative.  Negative for arthralgias, back pain, joint swelling, myalgias, neck pain and neck stiffness.   Skin: Negative.  Negative for color change, pallor and bruise.   Allergic/Immunologic: Negative.    Neurological: Negative.  Negative for dizziness, tremors, seizures, syncope, facial asymmetry, speech difficulty, weakness, light-headedness and numbness.   Hematological: Negative.  Negative for adenopathy. Does not bruise/bleed easily.   Psychiatric/Behavioral: Negative.  Negative for agitation, decreased concentration, self-injury, sleep disturbance, suicidal ideas, negative for hyperactivity and stress. The patient is not nervous/anxious.    All other systems reviewed and are negative.         Current Outpatient Medications:   •  aspirin  MG tablet, Take 325 mg by mouth Daily., Disp: , Rfl:   •  azelastine (OPTIVAR) 0.05 % ophthalmic solution, , Disp: , Rfl:   •  busPIRone (BUSPAR) 10 MG tablet, Take 10 mg by mouth 2 (Two) Times a Day., Disp: , Rfl:   •  CloNIDine (CATAPRES) 0.1 MG tablet, Take 1 tablet by mouth Every 8 (Eight) Hours As Needed for High Blood Pressure. SBP>150 MM/HG & OR DBP> 95 MM/HG, Disp: 40 tablet, Rfl: 5  •  colchicine 0.6 MG tablet, Take 0.6 mg by mouth Daily As Needed., Disp: , Rfl:   •  CRESTOR 20 MG tablet, TAKE 1 TABLET BY MOUTH ONCE DAILY, Disp: 90 tablet, Rfl: 3  •  famotidine (PEPCID) 40 MG tablet, Take 40 mg by mouth 2 (Two) Times a Day., Disp: , Rfl:   •  fexofenadine (ALLEGRA) 180 MG tablet, Take 180 mg by mouth Daily As Needed., Disp: , Rfl:   •  metFORMIN ER (GLUCOPHAGE-XR) 500 MG 24 hr tablet, Take 1 tablet by mouth Daily As  "Needed., Disp: , Rfl:   •  methocarbamol (ROBAXIN) 750 MG tablet, As Needed., Disp: , Rfl:   •  Olmesartan-amLODIPine-HCTZ 20-5-12.5 MG tablet, TAKE 1 TABLET BY MOUTH TWICE DAILY, Disp: 180 tablet, Rfl: 3  •  pantoprazole (PROTONIX) 40 MG EC tablet, Take 40 mg by mouth Daily., Disp: , Rfl:   •  temazepam (RESTORIL) 15 MG capsule, Take 15 mg by mouth 2 (Two) Times a Day., Disp: , Rfl:     Vitals:    02/24/20 1253   BP: 114/72   BP Location: Left arm   Patient Position: Sitting   Cuff Size: Adult   Pulse: 64   SpO2: 98%   Weight: 100 kg (221 lb)   Height: 182.9 cm (72\")       Physical Exam:     Physical Exam   Constitutional: He is oriented to person, place, and time. He appears well-developed and well-nourished. No distress.   HENT:   Head: Normocephalic and atraumatic.   Eyes: Pupils are equal, round, and reactive to light. No scleral icterus.   Neck: Trachea normal, normal range of motion and full passive range of motion without pain. Neck supple. No JVD present. Carotid bruit is not present. No thyroid mass and no thyromegaly present.   Cardiovascular: Normal rate, regular rhythm, S1 normal, S2 normal, normal heart sounds and intact distal pulses. PMI is not displaced. Exam reveals no gallop and no friction rub.   No murmur heard.  Pulses:       Carotid pulses are 1+ on the right side, and 1+ on the left side.  Pulmonary/Chest: Effort normal and breath sounds normal. No respiratory distress. He has no wheezes. He has no rales. He exhibits no tenderness.   Abdominal: Soft. Bowel sounds are normal. He exhibits no distension and no mass. There is no tenderness.   Musculoskeletal: Normal range of motion. He exhibits no edema.   Neurological: He is alert and oriented to person, place, and time. No sensory deficit.   Skin: Skin is warm and dry. Capillary refill takes less than 2 seconds. No rash noted. He is not diaphoretic.   Psychiatric: He has a normal mood and affect. His behavior is normal. Judgment and thought " content normal.   Nursing note and vitals reviewed.      Procedures    Results:   Reviewed medication regimen.  Reviewed vital signs, BMI of 30, stable blood pressure of 114/72.  Reviewed and updated surgical, medical, social history.    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  No changes made in his medication regimen.  Follow-up in 1 year or as needed for any cardiac related issues.  Guy was seen today for follow-up, coronary artery disease and hypertension.    Diagnoses and all orders for this visit:    Coronary artery disease involving native coronary artery of native heart without angina pectoris  -     Treadmill Stress Test; Future    Essential hypertension  -     Treadmill Stress Test; Future    Carotid artery calcification, left  -     Treadmill Stress Test; Future    Dyslipidemia  -     Treadmill Stress Test; Future        Return in about 1 year (around 2/24/2021).    DAR Kruger

## 2020-03-17 ENCOUNTER — TELEPHONE (OUTPATIENT)
Dept: CARDIOLOGY | Facility: CLINIC | Age: 60
End: 2020-03-17

## 2020-03-17 NOTE — TELEPHONE ENCOUNTER
Patient has taken Bisprolol 10 mg PRN for his HR, but has not had a prescription since 2018, as well as Clonidine 0.1mg PRN. You saw pt in Feb 2020. Is it ok to to sen these prescriptions in for the patient?  Benita Goodwin MA

## 2020-03-18 RX ORDER — CLONIDINE HYDROCHLORIDE 0.1 MG/1
0.1 TABLET ORAL EVERY 8 HOURS PRN
Qty: 40 TABLET | Refills: 5 | Status: SHIPPED | OUTPATIENT
Start: 2020-03-18 | End: 2021-01-18 | Stop reason: SDUPTHER

## 2020-03-18 RX ORDER — BISOPROLOL FUMARATE 10 MG/1
10 TABLET, FILM COATED ORAL DAILY
Qty: 30 TABLET | Refills: 6 | Status: SHIPPED | OUTPATIENT
Start: 2020-03-18 | End: 2021-01-18 | Stop reason: SDUPTHER

## 2020-08-24 RX ORDER — OLMESARTAN MEDOXOMIL, AMLODIPINE AND HYDROCHLOROTHIAZIDE TABLET 20/5/12.5 MG 20; 5; 12.5 MG/1; MG/1; MG/1
TABLET ORAL
Qty: 180 TABLET | Refills: 3 | Status: SHIPPED | OUTPATIENT
Start: 2020-08-24 | End: 2020-10-05

## 2020-08-25 RX ORDER — OLMESARTAN MEDOXOMIL, AMLODIPINE AND HYDROCHLOROTHIAZIDE TABLET 20/5/12.5 MG 20; 5; 12.5 MG/1; MG/1; MG/1
TABLET ORAL
Qty: 60 TABLET | Refills: 0 | OUTPATIENT
Start: 2020-08-25

## 2020-08-31 RX ORDER — OLMESARTAN MEDOXOMIL, AMLODIPINE AND HYDROCHLOROTHIAZIDE TABLET 20/5/12.5 MG 20; 5; 12.5 MG/1; MG/1; MG/1
TABLET ORAL
Qty: 60 TABLET | Refills: 0 | OUTPATIENT
Start: 2020-08-31

## 2020-10-05 RX ORDER — OLMESARTAN MEDOXOMIL, AMLODIPINE AND HYDROCHLOROTHIAZIDE TABLET 20/5/12.5 MG 20; 5; 12.5 MG/1; MG/1; MG/1
TABLET ORAL
Qty: 60 TABLET | Refills: 0 | Status: SHIPPED | OUTPATIENT
Start: 2020-10-05 | End: 2020-10-12 | Stop reason: SDUPTHER

## 2020-10-12 RX ORDER — OLMESARTAN MEDOXOMIL, AMLODIPINE AND HYDROCHLOROTHIAZIDE TABLET 20/5/12.5 MG 20; 5; 12.5 MG/1; MG/1; MG/1
1 TABLET ORAL 2 TIMES DAILY
Qty: 60 TABLET | Refills: 3 | Status: SHIPPED | OUTPATIENT
Start: 2020-10-12 | End: 2021-01-18 | Stop reason: SDUPTHER

## 2020-12-16 ENCOUNTER — TELEPHONE (OUTPATIENT)
Dept: CARDIOLOGY | Facility: CLINIC | Age: 60
End: 2020-12-16

## 2020-12-17 RX ORDER — ROSUVASTATIN CALCIUM 20 MG/1
TABLET, FILM COATED ORAL
Qty: 30 TABLET | Refills: 1 | Status: SHIPPED | OUTPATIENT
Start: 2020-12-17 | End: 2021-01-18 | Stop reason: SDUPTHER

## 2020-12-17 NOTE — TELEPHONE ENCOUNTER
Pharmacy called stating that Brand name Crestor was not covered but Generic name Rosuvastatin is. No note states why Pt was changed to brand name, Pharmacy switched to Rosuvastatin.

## 2021-01-18 ENCOUNTER — OFFICE VISIT (OUTPATIENT)
Dept: CARDIOLOGY | Facility: CLINIC | Age: 61
End: 2021-01-18

## 2021-01-18 VITALS
DIASTOLIC BLOOD PRESSURE: 86 MMHG | OXYGEN SATURATION: 98 % | WEIGHT: 219 LBS | BODY MASS INDEX: 29.66 KG/M2 | HEART RATE: 87 BPM | SYSTOLIC BLOOD PRESSURE: 146 MMHG | RESPIRATION RATE: 18 BRPM | HEIGHT: 72 IN

## 2021-01-18 DIAGNOSIS — I25.10 CORONARY ARTERY DISEASE INVOLVING NATIVE CORONARY ARTERY OF NATIVE HEART WITHOUT ANGINA PECTORIS: ICD-10-CM

## 2021-01-18 DIAGNOSIS — E78.5 DYSLIPIDEMIA: ICD-10-CM

## 2021-01-18 DIAGNOSIS — I10 ESSENTIAL HYPERTENSION: Primary | ICD-10-CM

## 2021-01-18 PROCEDURE — 99213 OFFICE O/P EST LOW 20 MIN: CPT | Performed by: INTERNAL MEDICINE

## 2021-01-18 RX ORDER — BISOPROLOL FUMARATE 10 MG/1
10 TABLET, FILM COATED ORAL DAILY
Qty: 90 TABLET | Refills: 3 | Status: SHIPPED | OUTPATIENT
Start: 2021-01-18 | End: 2021-12-27 | Stop reason: SDUPTHER

## 2021-01-18 RX ORDER — OLMESARTAN MEDOXOMIL, AMLODIPINE AND HYDROCHLOROTHIAZIDE TABLET 20/5/12.5 MG 20; 5; 12.5 MG/1; MG/1; MG/1
1 TABLET ORAL 2 TIMES DAILY
Qty: 180 TABLET | Refills: 3 | Status: SHIPPED | OUTPATIENT
Start: 2021-01-18 | End: 2021-12-27 | Stop reason: SDUPTHER

## 2021-01-18 RX ORDER — ROSUVASTATIN CALCIUM 20 MG/1
20 TABLET, FILM COATED ORAL DAILY
Qty: 90 TABLET | Refills: 3 | Status: SHIPPED | OUTPATIENT
Start: 2021-01-18 | End: 2021-12-27 | Stop reason: SDUPTHER

## 2021-01-18 RX ORDER — CLONIDINE HYDROCHLORIDE 0.1 MG/1
0.1 TABLET ORAL EVERY 8 HOURS PRN
Qty: 90 TABLET | Refills: 3 | Status: SHIPPED | OUTPATIENT
Start: 2021-01-18 | End: 2021-12-27 | Stop reason: SDUPTHER

## 2021-01-18 NOTE — PROGRESS NOTES
Subjective:     Encounter Date:01/18/2021      Patient ID: Guy Sheth is a 60 y.o. male.    Chief Complaint: Coronary artery disease  HPI  This is a 60-year-old male patient who presents to cardiology clinic for routine follow-up.  The patient indicates he has done well since his last visit without cardiovascular symptoms, issues or hospitalizations.  He reports compliance with his medications with no perceived side effects.  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  The patient has a history of prior remote myocardial infarction.  The patient has no chest discomfort at rest or with activity.  There is no exertional chest arm neck jaw shoulder or back discomfort.  There is no orthopnea PND or lower extremity edema.  There is no dizziness palpitations or syncope.  He has had no shortness of breath at rest or with activity.  He remains a non-smoker.  The following portions of the patient's history were reviewed and updated as appropriate: allergies, current medications, past family history, past medical history, past social history, past surgical history and problem  Review of Systems   Constitution: Negative for chills, diaphoresis, fever, malaise/fatigue, weight gain and weight loss.   HENT: Negative for ear discharge, hearing loss, hoarse voice and nosebleeds.    Eyes: Negative for discharge, double vision, pain and photophobia.   Cardiovascular: Negative for chest pain, claudication, cyanosis, dyspnea on exertion, irregular heartbeat, leg swelling, near-syncope, orthopnea, palpitations, paroxysmal nocturnal dyspnea and syncope.   Respiratory: Negative for cough, hemoptysis, shortness of breath, sputum production and wheezing.    Endocrine: Negative for cold intolerance, heat intolerance, polydipsia, polyphagia and polyuria.   Hematologic/Lymphatic: Negative for adenopathy and bleeding problem. Does not bruise/bleed easily.   Skin: Negative for color change,  flushing, itching and rash.   Musculoskeletal: Negative for muscle cramps, muscle weakness, myalgias and stiffness.   Gastrointestinal: Negative for abdominal pain, diarrhea, hematemesis, hematochezia, nausea and vomiting.   Genitourinary: Negative for dysuria, frequency and nocturia.   Neurological: Negative for focal weakness, loss of balance, numbness, paresthesias and seizures.   Psychiatric/Behavioral: Negative for altered mental status, hallucinations and suicidal ideas.   Allergic/Immunologic: Negative for HIV exposure, hives and persistent infections.           Current Outpatient Medications:   •  aspirin  MG tablet, Take 325 mg by mouth Daily., Disp: , Rfl:   •  azelastine (OPTIVAR) 0.05 % ophthalmic solution, , Disp: , Rfl:   •  bisoprolol (ZEBeta) 10 MG tablet, Take 1 tablet by mouth Daily., Disp: 90 tablet, Rfl: 3  •  busPIRone (BUSPAR) 10 MG tablet, Take 10 mg by mouth 2 (Two) Times a Day., Disp: , Rfl:   •  cloNIDine (Catapres) 0.1 MG tablet, Take 1 tablet by mouth Every 8 (Eight) Hours As Needed for High Blood Pressure. SBP>150 MM/HG & OR DBP> 95 MM/HG, Disp: 90 tablet, Rfl: 3  •  colchicine 0.6 MG tablet, Take 0.6 mg by mouth Daily As Needed., Disp: , Rfl:   •  Crestor 20 MG tablet, Take 1 tablet by mouth Daily., Disp: 90 tablet, Rfl: 3  •  famotidine (PEPCID) 40 MG tablet, Take 40 mg by mouth 2 (Two) Times a Day., Disp: , Rfl:   •  fexofenadine (ALLEGRA) 180 MG tablet, Take 180 mg by mouth Daily As Needed., Disp: , Rfl:   •  metFORMIN ER (GLUCOPHAGE-XR) 500 MG 24 hr tablet, Take 1 tablet by mouth Daily As Needed., Disp: , Rfl:   •  methocarbamol (ROBAXIN) 750 MG tablet, As Needed., Disp: , Rfl:   •  Olmesartan-amLODIPine-HCTZ 20-5-12.5 MG tablet, Take 1 tablet by mouth 2 (Two) Times a Day., Disp: 180 tablet, Rfl: 3  •  pantoprazole (PROTONIX) 40 MG EC tablet, Take 40 mg by mouth Daily., Disp: , Rfl:   •  temazepam (RESTORIL) 15 MG capsule, Take 15 mg by mouth 2 (Two) Times a Day., Disp: , Rfl:  "    Objective:   Vitals signs and nursing note reviewed.   Constitutional:       Appearance: Healthy appearance. Not in distress.   Neck:      Vascular: No JVR. JVD normal.   Pulmonary:      Effort: Pulmonary effort is normal.      Breath sounds: Normal breath sounds. No wheezing. No rhonchi. No rales.   Chest:      Chest wall: Not tender to palpatation.   Cardiovascular:      PMI at left midclavicular line. Normal rate. Regular rhythm. Normal S1. Normal S2.      Murmurs: There is no murmur.      No gallop. No click. No rub.   Pulses:     Intact distal pulses.   Edema:     Peripheral edema absent.   Abdominal:      General: Bowel sounds are normal.      Palpations: Abdomen is soft.      Tenderness: There is no abdominal tenderness.   Musculoskeletal: Normal range of motion.         General: No tenderness.   Skin:     General: Skin is warm and dry.   Neurological:      General: No focal deficit present.      Mental Status: Alert and oriented to person, place and time.       Blood pressure 146/86, pulse 87, resp. rate 18, height 182.9 cm (72.01\"), weight 99.3 kg (219 lb), SpO2 98 %.   Lab Review:     Assessment:       1. Essential hypertension  Labile blood pressures.  The patient has a long established history of \"whitecoat hypertension\".  His home blood pressure recordings have been much better.  - bisoprolol (ZEBeta) 10 MG tablet; Take 1 tablet by mouth Daily.  Dispense: 90 tablet; Refill: 3  - cloNIDine (Catapres) 0.1 MG tablet; Take 1 tablet by mouth Every 8 (Eight) Hours As Needed for High Blood Pressure. SBP>150 MM/HG & OR DBP> 95 MM/HG  Dispense: 90 tablet; Refill: 3  - Crestor 20 MG tablet; Take 1 tablet by mouth Daily.  Dispense: 90 tablet; Refill: 3  - Olmesartan-amLODIPine-HCTZ 20-5-12.5 MG tablet; Take 1 tablet by mouth 2 (Two) Times a Day.  Dispense: 180 tablet; Refill: 3    2. Dyslipidemia  This is followed closely by his primary care provider.  - Crestor 20 MG tablet; Take 1 tablet by mouth Daily.  " Dispense: 90 tablet; Refill: 3    3. Coronary artery disease involving native coronary artery of native heart without angina pectoris  Stable and angina free with active lifestyle.  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  He has been instructed to hold his beta-blocker the day before and morning of the stress test.  No changes in his medications have been made at today's visit.

## 2021-01-21 ENCOUNTER — TELEPHONE (OUTPATIENT)
Dept: CARDIOLOGY | Facility: CLINIC | Age: 61
End: 2021-01-21

## 2021-02-05 LAB
BH CV STRESS BP STAGE 1: NORMAL
BH CV STRESS BP STAGE 2: NORMAL
BH CV STRESS DURATION MIN STAGE 1: 3
BH CV STRESS DURATION MIN STAGE 2: 3
BH CV STRESS DURATION SEC STAGE 1: 0
BH CV STRESS DURATION SEC STAGE 2: 0
BH CV STRESS GRADE STAGE 1: 10
BH CV STRESS GRADE STAGE 2: 12
BH CV STRESS HR STAGE 1: 120
BH CV STRESS HR STAGE 2: 139
BH CV STRESS METS STAGE 1: 5
BH CV STRESS METS STAGE 2: 7.5
BH CV STRESS O2 STAGE 1: 96
BH CV STRESS O2 STAGE 2: 97
BH CV STRESS PROTOCOL 1: NORMAL
BH CV STRESS RECOVERY BP: NORMAL MMHG
BH CV STRESS RECOVERY HR: 100 BPM
BH CV STRESS RECOVERY O2: 97 %
BH CV STRESS SPEED STAGE 1: 1.7
BH CV STRESS SPEED STAGE 2: 2.5
BH CV STRESS STAGE 1: 1
BH CV STRESS STAGE 2: 2
MAXIMAL PREDICTED HEART RATE: 160 BPM
PERCENT MAX PREDICTED HR: 86.88 %
STRESS BASELINE BP: NORMAL MMHG
STRESS BASELINE HR: 89 BPM
STRESS O2 SAT REST: 98 %
STRESS PERCENT HR: 102 %
STRESS POST ESTIMATED WORKLOAD: 7 METS
STRESS POST EXERCISE DUR MIN: 6 MIN
STRESS POST O2 SAT PEAK: 97 %
STRESS POST PEAK BP: NORMAL MMHG
STRESS POST PEAK HR: 139 BPM
STRESS TARGET HR: 136 BPM

## 2021-02-08 ENCOUNTER — TELEPHONE (OUTPATIENT)
Dept: CARDIOLOGY | Facility: CLINIC | Age: 61
End: 2021-02-08

## 2021-02-08 NOTE — TELEPHONE ENCOUNTER
No evidence of ischemic heart disease at cardiac workload achieved  Moderately severe decrease in effort tolerance with aerobic deconditioning.  Hypertensive blood pressure response to exercise.

## 2021-11-16 ENCOUNTER — TELEPHONE (OUTPATIENT)
Dept: CARDIOLOGY | Facility: CLINIC | Age: 61
End: 2021-11-16

## 2021-11-16 NOTE — TELEPHONE ENCOUNTER
Called to check on PA for Olmesartan-amlodipine-HCTZ due to it being over 24 hours for a response. [employer] did not answer due to technical difficulties. Unable to LVM to notify Pt.

## 2021-12-27 ENCOUNTER — OFFICE VISIT (OUTPATIENT)
Dept: CARDIOLOGY | Facility: CLINIC | Age: 61
End: 2021-12-27

## 2021-12-27 VITALS
WEIGHT: 211.4 LBS | HEIGHT: 72 IN | DIASTOLIC BLOOD PRESSURE: 86 MMHG | SYSTOLIC BLOOD PRESSURE: 120 MMHG | OXYGEN SATURATION: 97 % | BODY MASS INDEX: 28.63 KG/M2 | HEART RATE: 79 BPM

## 2021-12-27 DIAGNOSIS — I25.10 CORONARY ARTERY DISEASE INVOLVING NATIVE CORONARY ARTERY OF NATIVE HEART WITHOUT ANGINA PECTORIS: ICD-10-CM

## 2021-12-27 DIAGNOSIS — E78.5 DYSLIPIDEMIA: ICD-10-CM

## 2021-12-27 DIAGNOSIS — I65.22 CAROTID ARTERY CALCIFICATION, LEFT: Primary | ICD-10-CM

## 2021-12-27 DIAGNOSIS — I10 ESSENTIAL HYPERTENSION: ICD-10-CM

## 2021-12-27 PROCEDURE — 99213 OFFICE O/P EST LOW 20 MIN: CPT | Performed by: INTERNAL MEDICINE

## 2021-12-27 RX ORDER — OLMESARTAN MEDOXOMIL, AMLODIPINE AND HYDROCHLOROTHIAZIDE TABLET 20/5/12.5 MG 20; 5; 12.5 MG/1; MG/1; MG/1
1 TABLET ORAL 2 TIMES DAILY
Qty: 180 TABLET | Refills: 3 | Status: SHIPPED | OUTPATIENT
Start: 2021-12-27

## 2021-12-27 RX ORDER — DULOXETIN HYDROCHLORIDE 30 MG/1
30 CAPSULE, DELAYED RELEASE ORAL DAILY
COMMUNITY
Start: 2021-09-18

## 2021-12-27 RX ORDER — CLONIDINE HYDROCHLORIDE 0.1 MG/1
0.1 TABLET ORAL EVERY 8 HOURS PRN
Qty: 90 TABLET | Refills: 3 | Status: SHIPPED | OUTPATIENT
Start: 2021-12-27

## 2021-12-27 RX ORDER — DAPAGLIFLOZIN 10 MG/1
TABLET, FILM COATED ORAL
COMMUNITY
Start: 2021-11-09

## 2021-12-27 RX ORDER — BISOPROLOL FUMARATE 10 MG/1
10 TABLET, FILM COATED ORAL DAILY
Qty: 90 TABLET | Refills: 3 | Status: SHIPPED | OUTPATIENT
Start: 2021-12-27

## 2021-12-27 RX ORDER — ROSUVASTATIN CALCIUM 20 MG/1
20 TABLET, FILM COATED ORAL DAILY
Qty: 90 TABLET | Refills: 3 | Status: SHIPPED | OUTPATIENT
Start: 2021-12-27 | End: 2022-05-03 | Stop reason: SDUPTHER

## 2021-12-27 RX ORDER — CETIRIZINE HYDROCHLORIDE 10 MG/1
10 TABLET ORAL DAILY
COMMUNITY
Start: 2021-09-13

## 2021-12-27 RX ORDER — CYCLOBENZAPRINE HCL 5 MG
TABLET ORAL
COMMUNITY
Start: 2021-10-23

## 2021-12-27 RX ORDER — FAMOTIDINE 20 MG/1
TABLET, FILM COATED ORAL
COMMUNITY
Start: 2021-10-31

## 2021-12-27 NOTE — PROGRESS NOTES
Subjective:     Encounter Date:12/27/2021      Patient ID: Guy Sheth is a 61 y.o. male.    Chief Complaint: Coronary artery disease  HPI  This is a 61-year-old male patient who presents to cardiology clinic for routine follow-up. 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 He is due to have the stress test. He has had no active cardiovascular issues, symptoms or hospitalizations. He reports compliance with his medications with no perceived side effects. He remains a non-smoker.  The following portions of the patient's history were reviewed and updated as appropriate: allergies, current medications, past family history, past medical history, past social history, past surgical history and problem  Review of Systems   Constitutional: Negative for chills, diaphoresis, fever, malaise/fatigue, weight gain and weight loss.   HENT: Negative for ear discharge, hearing loss, hoarse voice and nosebleeds.    Eyes: Negative for discharge, double vision, pain and photophobia.   Cardiovascular: Negative for chest pain, claudication, cyanosis, dyspnea on exertion, irregular heartbeat, leg swelling, near-syncope, orthopnea, palpitations, paroxysmal nocturnal dyspnea and syncope.   Respiratory: Negative for cough, hemoptysis, shortness of breath, sputum production and wheezing.    Endocrine: Negative for cold intolerance, heat intolerance, polydipsia, polyphagia and polyuria.   Hematologic/Lymphatic: Negative for adenopathy and bleeding problem. Does not bruise/bleed easily.   Skin: Negative for color change, flushing, itching and rash.   Musculoskeletal: Negative for muscle cramps, muscle weakness, myalgias and stiffness.   Gastrointestinal: Negative for abdominal pain, diarrhea, hematemesis, hematochezia, nausea and vomiting.   Genitourinary: Negative for dysuria, frequency and nocturia.   Neurological: Negative for focal weakness, loss of balance, numbness,  paresthesias and seizures.   Psychiatric/Behavioral: Negative for altered mental status, hallucinations and suicidal ideas.   Allergic/Immunologic: Negative for HIV exposure, hives and persistent infections.           Current Outpatient Medications:   •  aspirin  MG tablet, Take 325 mg by mouth Daily., Disp: , Rfl:   •  azelastine (OPTIVAR) 0.05 % ophthalmic solution, , Disp: , Rfl:   •  bisoprolol (ZEBeta) 10 MG tablet, Take 1 tablet by mouth Daily., Disp: 90 tablet, Rfl: 3  •  busPIRone (BUSPAR) 10 MG tablet, Take 10 mg by mouth 2 (Two) Times a Day., Disp: , Rfl:   •  cetirizine (zyrTEC) 10 MG tablet, Take 10 mg by mouth Daily., Disp: , Rfl:   •  cloNIDine (Catapres) 0.1 MG tablet, Take 1 tablet by mouth Every 8 (Eight) Hours As Needed for High Blood Pressure. SBP>150 MM/HG & OR DBP> 95 MM/HG, Disp: 90 tablet, Rfl: 3  •  colchicine 0.6 MG tablet, Take 0.6 mg by mouth Daily As Needed., Disp: , Rfl:   •  Crestor 20 MG tablet, Take 1 tablet by mouth Daily., Disp: 90 tablet, Rfl: 3  •  cyclobenzaprine (FLEXERIL) 5 MG tablet, , Disp: , Rfl:   •  DULoxetine (CYMBALTA) 30 MG capsule, Take 30 mg by mouth Daily., Disp: , Rfl:   •  famotidine (PEPCID) 20 MG tablet, , Disp: , Rfl:   •  Farxiga 10 MG tablet, , Disp: , Rfl:   •  fexofenadine (ALLEGRA) 180 MG tablet, Take 180 mg by mouth Daily As Needed., Disp: , Rfl:   •  metFORMIN ER (GLUCOPHAGE-XR) 500 MG 24 hr tablet, Take 1 tablet by mouth Daily As Needed., Disp: , Rfl:   •  methocarbamol (ROBAXIN) 750 MG tablet, As Needed., Disp: , Rfl:   •  Olmesartan-amLODIPine-HCTZ 20-5-12.5 MG tablet, Take 1 tablet by mouth 2 (Two) Times a Day., Disp: 180 tablet, Rfl: 3  •  pantoprazole (PROTONIX) 40 MG EC tablet, Take 40 mg by mouth Daily., Disp: , Rfl:   •  temazepam (RESTORIL) 15 MG capsule, Take 15 mg by mouth 2 (Two) Times a Day., Disp: , Rfl:     Objective:   Vitals and nursing note reviewed.   Constitutional:       Appearance: Healthy appearance. Not in distress.   Neck:  "     Vascular: No JVR. JVD normal.   Pulmonary:      Effort: Pulmonary effort is normal.      Breath sounds: Normal breath sounds. No wheezing. No rhonchi. No rales.   Chest:      Chest wall: Not tender to palpatation.   Cardiovascular:      PMI at left midclavicular line. Normal rate. Regular rhythm. Normal S1. Normal S2.      Murmurs: There is no murmur.      No gallop. No click. No rub.   Pulses:     Intact distal pulses.   Edema:     Peripheral edema absent.   Abdominal:      General: Bowel sounds are normal.      Palpations: Abdomen is soft.      Tenderness: There is no abdominal tenderness.   Musculoskeletal: Normal range of motion.         General: No tenderness. Skin:     General: Skin is warm and dry.   Neurological:      General: No focal deficit present.      Mental Status: Alert and oriented to person, place and time.       Blood pressure 120/86, pulse 79, height 182.9 cm (72\"), weight 95.9 kg (211 lb 6.4 oz), SpO2 97 %.   Lab Review:     Assessment:       1. Carotid artery calcification, left  Asymptomatic.    2. Coronary artery disease involving native coronary artery of native heart without angina pectoris      3. Essential hypertension  Acceptable blood pressure control.    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 Beta-blocker therapy should not be discontinued prior to treadmill stress testing.  No changes in his medications have been made at today's visit.      "

## 2022-02-22 LAB
BH CV STRESS BP STAGE 1: NORMAL
BH CV STRESS BP STAGE 2: NORMAL
BH CV STRESS DURATION MIN STAGE 1: 3
BH CV STRESS DURATION MIN STAGE 2: 3
BH CV STRESS DURATION SEC STAGE 1: 0
BH CV STRESS DURATION SEC STAGE 2: 0
BH CV STRESS GRADE STAGE 1: 10
BH CV STRESS GRADE STAGE 2: 12
BH CV STRESS HR STAGE 1: 123
BH CV STRESS HR STAGE 2: 149
BH CV STRESS METS STAGE 1: 5
BH CV STRESS METS STAGE 2: 7.5
BH CV STRESS O2 STAGE 1: 93
BH CV STRESS O2 STAGE 2: 92
BH CV STRESS PROTOCOL 1: NORMAL
BH CV STRESS RECOVERY BP: NORMAL MMHG
BH CV STRESS RECOVERY HR: 112 BPM
BH CV STRESS RECOVERY O2: 95 %
BH CV STRESS SPEED STAGE 1: 1.7
BH CV STRESS SPEED STAGE 2: 2.5
BH CV STRESS STAGE 1: 1
BH CV STRESS STAGE 2: 2
MAXIMAL PREDICTED HEART RATE: 159 BPM
PERCENT MAX PREDICTED HR: 93.71 %
STRESS BASELINE BP: NORMAL MMHG
STRESS BASELINE HR: 96 BPM
STRESS O2 SAT REST: 97 %
STRESS PERCENT HR: 110 %
STRESS POST ESTIMATED WORKLOAD: 7 METS
STRESS POST EXERCISE DUR MIN: 6 MIN
STRESS POST O2 SAT PEAK: 92 %
STRESS POST PEAK BP: NORMAL MMHG
STRESS POST PEAK HR: 149 BPM
STRESS TARGET HR: 135 BPM

## 2022-05-03 ENCOUNTER — TELEPHONE (OUTPATIENT)
Dept: CARDIOLOGY | Facility: CLINIC | Age: 62
End: 2022-05-03

## 2022-05-03 DIAGNOSIS — I10 ESSENTIAL HYPERTENSION: ICD-10-CM

## 2022-05-03 DIAGNOSIS — E78.5 DYSLIPIDEMIA: ICD-10-CM

## 2022-05-03 RX ORDER — ROSUVASTATIN CALCIUM 20 MG/1
20 TABLET, FILM COATED ORAL DAILY
Qty: 90 TABLET | Refills: 3 | Status: SHIPPED | OUTPATIENT
Start: 2022-05-03 | End: 2022-05-03 | Stop reason: SDUPTHER

## 2022-05-03 RX ORDER — ROSUVASTATIN CALCIUM 20 MG/1
20 TABLET, FILM COATED ORAL DAILY
Qty: 30 TABLET | Refills: 3 | Status: SHIPPED | OUTPATIENT
Start: 2022-05-03 | End: 2023-01-16 | Stop reason: SDUPTHER

## 2022-05-03 NOTE — TELEPHONE ENCOUNTER
Patient requested refill of medication. This med needed a PA. PA was submitted and approved. 90 day supply was still $200. Sent in refill for 30 for cheaper co-pay    *Update    PA has been approved. Spoke with [facility], with patient's coupon and PA approval it will cost patient $3.00 for a 90 day supply.

## 2022-05-03 NOTE — TELEPHONE ENCOUNTER
Patient's wife called office stating his Crestor 20 mg qday needed a PA. States the patient has to have NAME BRAND ONLY because the generic causes myalgia. PA was submitted via [employer]. Will update patient when approval/denial is received.

## 2022-10-17 ENCOUNTER — TELEPHONE (OUTPATIENT)
Dept: CARDIOLOGY | Facility: CLINIC | Age: 62
End: 2022-10-17

## 2022-10-17 NOTE — TELEPHONE ENCOUNTER
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     Do you require a callback: YES

## 2022-10-18 NOTE — TELEPHONE ENCOUNTER
BERE PATIENT.  HIS FOLLOW UP WAS RESCHEDULED BY HIS WIFE THRU THE HUB TO January 2023.  I LET HIM KNOW THAT HE MUST SEE [facility] FIRST SO THAT HE CAN ORDER THE STRESS TEST.  PATIENT STATES UNDERSTANDING.

## 2022-12-12 ENCOUNTER — OFFICE VISIT (OUTPATIENT)
Dept: CARDIOLOGY | Facility: CLINIC | Age: 62
End: 2022-12-12

## 2022-12-12 VITALS
RESPIRATION RATE: 14 BRPM | OXYGEN SATURATION: 97 % | WEIGHT: 220 LBS | HEART RATE: 69 BPM | HEIGHT: 72 IN | BODY MASS INDEX: 29.8 KG/M2

## 2022-12-12 DIAGNOSIS — I25.10 CORONARY ARTERY DISEASE INVOLVING NATIVE CORONARY ARTERY OF NATIVE HEART WITHOUT ANGINA PECTORIS: Primary | ICD-10-CM

## 2022-12-12 DIAGNOSIS — I10 ESSENTIAL HYPERTENSION: ICD-10-CM

## 2022-12-12 DIAGNOSIS — I65.22 CAROTID ARTERY CALCIFICATION, LEFT: ICD-10-CM

## 2022-12-12 DIAGNOSIS — E78.5 DYSLIPIDEMIA: ICD-10-CM

## 2022-12-12 PROCEDURE — 99213 OFFICE O/P EST LOW 20 MIN: CPT | Performed by: INTERNAL MEDICINE

## 2022-12-12 NOTE — PROGRESS NOTES
Subjective:     Encounter Date:12/12/2022      Patient ID: Guy Sheth is a 62 y.o. male.    Chief Complaint:  HPI    The following portions of the patient's history were reviewed and updated as appropriate: allergies, current medications, past family history, past medical history, past social history, past surgical history and problem  Review of Systems   Constitutional: Negative for chills, diaphoresis, fever, malaise/fatigue, weight gain and weight loss.   HENT: Negative for ear discharge, hearing loss, hoarse voice and nosebleeds.    Eyes: Negative for discharge, double vision, pain and photophobia.   Cardiovascular: Negative for chest pain, claudication, cyanosis, dyspnea on exertion, irregular heartbeat, leg swelling, near-syncope, orthopnea, palpitations, paroxysmal nocturnal dyspnea and syncope.   Respiratory: Negative for cough, hemoptysis, shortness of breath, sputum production and wheezing.    Endocrine: Negative for cold intolerance, heat intolerance, polydipsia, polyphagia and polyuria.   Hematologic/Lymphatic: Negative for adenopathy and bleeding problem. Does not bruise/bleed easily.   Skin: Negative for color change, flushing, itching and rash.   Musculoskeletal: Negative for muscle cramps, muscle weakness, myalgias and stiffness.   Gastrointestinal: Negative for abdominal pain, diarrhea, hematemesis, hematochezia, nausea and vomiting.   Genitourinary: Negative for dysuria, frequency and nocturia.   Neurological: Negative for focal weakness, loss of balance, numbness, paresthesias and seizures.   Psychiatric/Behavioral: Negative for altered mental status, hallucinations and suicidal ideas.   Allergic/Immunologic: Negative for HIV exposure, hives and persistent infections.           Current Outpatient Medications:   •  aspirin  MG tablet, Take 325 mg by mouth Daily., Disp: , Rfl:   •  azelastine (OPTIVAR) 0.05 % ophthalmic solution, , Disp: , Rfl:   •  bisoprolol (ZEBeta) 10 MG tablet,  Take 1 tablet by mouth Daily., Disp: 90 tablet, Rfl: 3  •  busPIRone (BUSPAR) 10 MG tablet, Take 10 mg by mouth 2 (Two) Times a Day., Disp: , Rfl:   •  cetirizine (zyrTEC) 10 MG tablet, Take 10 mg by mouth Daily., Disp: , Rfl:   •  cloNIDine (Catapres) 0.1 MG tablet, Take 1 tablet by mouth Every 8 (Eight) Hours As Needed for High Blood Pressure. SBP>150 MM/HG & OR DBP> 95 MM/HG, Disp: 90 tablet, Rfl: 3  •  colchicine 0.6 MG tablet, Take 0.6 mg by mouth Daily As Needed., Disp: , Rfl:   •  Crestor 20 MG tablet, Take 1 tablet by mouth Daily., Disp: 30 tablet, Rfl: 3  •  cyclobenzaprine (FLEXERIL) 5 MG tablet, , Disp: , Rfl:   •  DULoxetine (CYMBALTA) 30 MG capsule, Take 30 mg by mouth Daily., Disp: , Rfl:   •  famotidine (PEPCID) 20 MG tablet, , Disp: , Rfl:   •  Farxiga 10 MG tablet, , Disp: , Rfl:   •  fexofenadine (ALLEGRA) 180 MG tablet, Take 180 mg by mouth Daily As Needed., Disp: , Rfl:   •  metFORMIN ER (GLUCOPHAGE-XR) 500 MG 24 hr tablet, Take 1 tablet by mouth Daily As Needed., Disp: , Rfl:   •  Olmesartan-amLODIPine-HCTZ 20-5-12.5 MG tablet, Take 1 tablet by mouth 2 (Two) Times a Day., Disp: 180 tablet, Rfl: 3  •  pantoprazole (PROTONIX) 40 MG EC tablet, Take 40 mg by mouth Daily., Disp: , Rfl:   •  temazepam (RESTORIL) 15 MG capsule, Take 15 mg by mouth 2 (Two) Times a Day., Disp: , Rfl:     Objective:   Vitals and nursing note reviewed.   Constitutional:       Appearance: Healthy appearance. Not in distress.   Neck:      Vascular: No JVR. JVD normal.   Pulmonary:      Effort: Pulmonary effort is normal.      Breath sounds: Normal breath sounds. No wheezing. No rhonchi. No rales.   Chest:      Chest wall: Not tender to palpatation.   Cardiovascular:      PMI at left midclavicular line. Normal rate. Regular rhythm. Normal S1. Normal S2.      Murmurs: There is no murmur.      No gallop. No click. No rub.   Pulses:     Intact distal pulses.   Edema:     Peripheral edema absent.   Abdominal:      General: Bowel  "sounds are normal.      Palpations: Abdomen is soft.      Tenderness: There is no abdominal tenderness.   Musculoskeletal: Normal range of motion.         General: No tenderness. Skin:     General: Skin is warm and dry.   Neurological:      General: No focal deficit present.      Mental Status: Alert and oriented to person, place and time.       BP:130/88,  Pulse 69, resp. rate 14, height 182.9 cm (72\"), weight 99.8 kg (220 lb), SpO2 97 %.   Lab Review:     Assessment:       1. Carotid artery calcification, left  Asymptomatic.    2. Coronary artery disease involving native coronary artery of native heart without angina pectoris  Stable and angina free with active lifestyle.  The patient requires a yearly treadmill exercise stress test for his \"high-risk\" occupation of .  This is a \"pre-specified\" DOT requirement.    3. Dyslipidemia  Tolerating high intensity statin based cholesterol-lowering therapy without side effects.    4. Essential hypertension  Acceptable blood pressure control.    Procedures    Plan:     Advance Care Planning   ACP discussion was held with the patient during this visit. Patient has an advance directive (not in EMR), copy requested.     I have recommended his yearly treadmill exercise stress test to be performed in the near future to meet his DOT requirements.    No changes in medications have been made at today's visit.    The patient is encouraged to maintain his active lifestyle.      "

## 2022-12-30 ENCOUNTER — TELEPHONE (OUTPATIENT)
Dept: CARDIOLOGY | Facility: CLINIC | Age: 62
End: 2022-12-30
Payer: COMMERCIAL

## 2023-01-04 LAB
BH CV STRESS BP STAGE 1: NORMAL
BH CV STRESS BP STAGE 2: NORMAL
BH CV STRESS DURATION MIN STAGE 1: 3
BH CV STRESS DURATION MIN STAGE 2: 3
BH CV STRESS DURATION MIN STAGE 3: 3
BH CV STRESS DURATION SEC STAGE 1: 0
BH CV STRESS DURATION SEC STAGE 2: 0
BH CV STRESS DURATION SEC STAGE 3: 0
BH CV STRESS GRADE STAGE 1: 10
BH CV STRESS GRADE STAGE 2: 12
BH CV STRESS GRADE STAGE 3: 14
BH CV STRESS HR STAGE 1: 121
BH CV STRESS HR STAGE 2: 133
BH CV STRESS HR STAGE 3: 146
BH CV STRESS METS STAGE 1: 5
BH CV STRESS METS STAGE 2: 7.5
BH CV STRESS METS STAGE 3: 10
BH CV STRESS O2 STAGE 1: 95
BH CV STRESS O2 STAGE 2: 95
BH CV STRESS O2 STAGE 3: 95
BH CV STRESS PROTOCOL 1: NORMAL
BH CV STRESS RECOVERY BP: NORMAL MMHG
BH CV STRESS RECOVERY HR: 118 BPM
BH CV STRESS RECOVERY O2: 95 %
BH CV STRESS SPEED STAGE 1: 1.7
BH CV STRESS SPEED STAGE 2: 2.5
BH CV STRESS SPEED STAGE 3: 3.4
BH CV STRESS STAGE 1: 1
BH CV STRESS STAGE 2: 2
BH CV STRESS STAGE 3: 3
MAXIMAL PREDICTED HEART RATE: 158 BPM
PERCENT MAX PREDICTED HR: 92.41 %
STRESS BASELINE BP: NORMAL MMHG
STRESS BASELINE HR: 105 BPM
STRESS O2 SAT REST: 96 %
STRESS PERCENT HR: 109 %
STRESS POST ESTIMATED WORKLOAD: 10.1 METS
STRESS POST EXERCISE DUR MIN: 7 MIN
STRESS POST EXERCISE DUR SEC: 26 SEC
STRESS POST O2 SAT PEAK: 95 %
STRESS POST PEAK BP: NORMAL MMHG
STRESS POST PEAK HR: 146 BPM
STRESS TARGET HR: 134 BPM

## 2023-01-04 NOTE — TELEPHONE ENCOUNTER
Is there anyway this stress test could be read by Dr Mariano. The Pt is not allowed to go back to work until he has the results of the stress test.

## 2023-01-10 ENCOUNTER — TRANSCRIBE ORDERS (OUTPATIENT)
Dept: ADMINISTRATIVE | Facility: HOSPITAL | Age: 63
End: 2023-01-10
Payer: COMMERCIAL

## 2023-01-10 DIAGNOSIS — N18.30 STAGE 3 CHRONIC KIDNEY DISEASE, UNSPECIFIED WHETHER STAGE 3A OR 3B CKD: Primary | ICD-10-CM

## 2023-01-16 ENCOUNTER — TELEPHONE (OUTPATIENT)
Dept: CARDIOLOGY | Facility: CLINIC | Age: 63
End: 2023-01-16
Payer: COMMERCIAL

## 2023-01-16 DIAGNOSIS — E78.5 DYSLIPIDEMIA: ICD-10-CM

## 2023-01-16 DIAGNOSIS — I10 ESSENTIAL HYPERTENSION: ICD-10-CM

## 2023-01-16 RX ORDER — ROSUVASTATIN CALCIUM 20 MG/1
20 TABLET, FILM COATED ORAL DAILY
Qty: 30 TABLET | Refills: 11 | Status: SHIPPED | OUTPATIENT
Start: 2023-01-16

## 2023-01-16 NOTE — TELEPHONE ENCOUNTER
Caller: DEBBIE    Relationship: WIFE    Best call back number: [phone] / [phone]    What is the best time to reach you: ANYTIME    Who are you requesting to speak with (clinical staff, provider,  specific staff member): ANY      What was the call regarding: PATIENTS WIFE IS CALLING IN TO GET PRIOR AUTH FOR THE SCRIPT OF CRESTOR 20 MG (HAS TO HAVE NAME BRAND DUE TO OTHERS CAUSING CRAMPING). PLEASE ADVISE WHEN THIS IS DONE.    Do you require a callback: YES

## 2023-01-30 ENCOUNTER — HOSPITAL ENCOUNTER (OUTPATIENT)
Dept: ULTRASOUND IMAGING | Facility: HOSPITAL | Age: 63
Discharge: HOME | End: 2023-01-30
Admitting: INTERNAL MEDICINE
Payer: COMMERCIAL

## 2023-01-30 DIAGNOSIS — N18.30 STAGE 3 CHRONIC KIDNEY DISEASE, UNSPECIFIED WHETHER STAGE 3A OR 3B CKD: ICD-10-CM

## 2023-01-30 PROCEDURE — 76775 US EXAM ABDO BACK WALL LIM: CPT

## 2023-05-30 DIAGNOSIS — E78.5 DYSLIPIDEMIA: ICD-10-CM

## 2023-05-30 DIAGNOSIS — I10 ESSENTIAL HYPERTENSION: ICD-10-CM

## 2023-05-30 RX ORDER — ROSUVASTATIN CALCIUM 20 MG/1
20 TABLET, FILM COATED ORAL DAILY
Qty: 30 TABLET | Refills: 4 | Status: SHIPPED | OUTPATIENT
Start: 2023-05-30

## 2023-05-30 RX ORDER — ROSUVASTATIN CALCIUM 20 MG/1
20 TABLET, FILM COATED ORAL DAILY
Qty: 30 TABLET | Refills: 4 | Status: SHIPPED | OUTPATIENT
Start: 2023-05-30 | End: 2023-05-30

## 2023-11-09 ENCOUNTER — TELEPHONE (OUTPATIENT)
Dept: CARDIOLOGY | Facility: CLINIC | Age: 63
End: 2023-11-09
Payer: COMMERCIAL

## 2023-11-09 DIAGNOSIS — I25.10 CORONARY ARTERY DISEASE INVOLVING NATIVE CORONARY ARTERY OF NATIVE HEART WITHOUT ANGINA PECTORIS: Primary | ICD-10-CM

## 2023-11-09 NOTE — TELEPHONE ENCOUNTER
Caller: Patricia Sheth    Relationship: Emergency Contact    Best call back number: [phone]    What orders are you requesting (i.e. 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 DO LEAVE VM IF NO ANSWER

## 2023-12-12 NOTE — PROGRESS NOTES
[facility] Cardiology Office Follow Up Note    Guy Sheth  [id number]  2023    Primary Care Provider: Michael Mcintosh DO   Referring Provider: No ref. provider found    Chief Complaint: Follow-up of treadmill stress test    History of Present Illness:   Mr. Guy Sheth is a 63 y.o. male who presents to the Cardiology Clinic for follow-up after treadmill stress test.  The patient reports feeling well from a cardiac standpoint.  He denies chest pain and dyspnea.  He denies palpitations, dizziness, syncope.  He denies orthopnea, PND, and lower extremity edema.  He continues to take his medications as prescribed without perceived side effects.  He is not currently taking his blood pressure at home but reports that it is well-controlled at his other healthcare appointments.  He offers no other complaints or concerns at this time.    Past Cardiac Testin. Last Coronary Angio: 3/24/2003 Remote (scanned into chart)  2. Prior Stress Testin2023    No ECG evidence of myocardial ischemia.    Negative clinical evidence of myocardial ischemia.    Findings consistent with a normal ECG stress test.   3. Last Echo: 10/21/2008 Remote (scanned into chart)  4. Prior Holter Monitor: None    Review of Systems:   Review of Systems  As Noted in HPI.   I have reviewed and confirmed the accuracy of the ROS as documented by the MA/MELYSSA/RN DAR Haile    I have reviewed and/or updated the patient's past medical, past surgical, family, social history, problem list and allergies as appropriate.     Medications:     Current Outpatient Medications:     aspirin  MG tablet, Take 1 tablet by mouth Daily., Disp: , Rfl:     bisoprolol (ZEBeta) 10 MG tablet, Take 1 tablet by mouth Daily., Disp: 90 tablet, Rfl: 3    busPIRone (BUSPAR) 10 MG tablet, Take 1 tablet by mouth 2 (Two) Times a Day., Disp: , Rfl:     cetirizine (zyrTEC) 10 MG tablet, Take 1 tablet by mouth Daily., Disp: ,  "Rfl:     cloNIDine (Catapres) 0.1 MG tablet, Take 1 tablet by mouth Every 8 (Eight) Hours As Needed for High Blood Pressure. SBP>150 MM/HG & OR DBP> 95 MM/HG, Disp: 90 tablet, Rfl: 3    Crestor 20 MG tablet, Take 1 tablet by mouth Daily., Disp: 30 tablet, Rfl: 4    DULoxetine (CYMBALTA) 60 MG capsule, , Disp: , Rfl:     famotidine (PEPCID) 20 MG tablet, , Disp: , Rfl:     Farxiga 10 MG tablet, , Disp: , Rfl:     fexofenadine (ALLEGRA) 180 MG tablet, Take 1 tablet by mouth Daily As Needed., Disp: , Rfl:     fluticasone (FLONASE) 50 MCG/ACT nasal spray, Daily., Disp: , Rfl:     gabapentin (NEURONTIN) 100 MG capsule, , Disp: , Rfl:     Kerendia 10 MG tablet, Take 1 tablet by mouth Daily., Disp: , Rfl:     linaclotide (LINZESS) 290 MCG capsule capsule, Every Morning, Disp: , Rfl:     metaxalone (SKELAXIN) 800 MG tablet, TAKE 1 TABLET BY MOUTH THREE TIMES DAILY AS NEEDED FOR MUSCLE PAIN, Disp: , Rfl:     metFORMIN ER (GLUCOPHAGE-XR) 500 MG 24 hr tablet, Take 1 tablet by mouth Daily As Needed., Disp: , Rfl:     Olmesartan-amLODIPine-HCTZ 20-5-12.5 MG tablet, Take 1 tablet by mouth 2 (Two) Times a Day., Disp: 180 tablet, Rfl: 3    pantoprazole (PROTONIX) 40 MG EC tablet, Take 1 tablet by mouth Daily., Disp: , Rfl:     tamsulosin (FLOMAX) 0.4 MG capsule 24 hr capsule, , Disp: , Rfl:     temazepam (RESTORIL) 15 MG capsule, Take 1 capsule by mouth 2 (Two) Times a Day., Disp: , Rfl:     traZODone (DESYREL) 150 MG tablet, Take 1 tablet by mouth At Night As Needed for Sleep., Disp: , Rfl:     Physical Exam:  Vital Signs:   Vitals:    12/13/23 1320 12/13/23 1339   BP: 142/100 130/100   BP Location: Left arm Left arm   Patient Position: Sitting Sitting   Cuff Size: Adult Adult   Pulse: 75    SpO2: 97%    Weight: 95.7 kg (211 lb)    Height: 182.9 cm (72\")      Body mass index is 28.62 kg/m².    Physical Exam  Vitals and nursing note reviewed.   Constitutional:       General: He is not in acute distress.  HENT:      Head: " Normocephalic and atraumatic.   Neck:      Trachea: Trachea normal.   Cardiovascular:      Rate and Rhythm: Normal rate and regular rhythm.      Pulses: Normal pulses.      Heart sounds: Normal heart sounds. No murmur heard.     No friction rub. No gallop.   Pulmonary:      Effort: Pulmonary effort is normal.      Breath sounds: Normal breath sounds.   Musculoskeletal:      Cervical back: Neck supple.      Right lower leg: No edema.      Left lower leg: No edema.   Skin:     General: Skin is warm and dry.   Neurological:      Mental Status: He is alert and oriented to person, place, and time.   Psychiatric:         Mood and Affect: Mood normal.         Behavior: Behavior normal. Behavior is cooperative.         Thought Content: Thought content does not include suicidal ideation.         Results Review:   I reviewed the patient's new clinical results.    Procedures    Assessment / Plan:   Diagnoses and all orders for this visit:    1. Essential hypertension (Primary)  Encouraged low-sodium diet, regular exercise, and avoidance of NSAIDs for long-term BP control  Patient will return to clinic in 1 week for blood pressure recheck at which time he understands his medication may be titrated to meet his blood pressure goal of less than 140 over less than 190      Preventative Cardiology:   Tobacco Cessation: N/A   Advance Care Planning: ACP discussion was declined by the patient. Patient does not have an advance directive, declines further assistance.     Follow Up:   No follow-ups on file.      Thank you for allowing me to participate in the care of your patient. Please do not hesitate to contact me with additional questions or concerns.     DAR Restrepo

## 2023-12-13 ENCOUNTER — OFFICE VISIT (OUTPATIENT)
Dept: CARDIOLOGY | Facility: CLINIC | Age: 63
End: 2023-12-13
Payer: COMMERCIAL

## 2023-12-13 VITALS
BODY MASS INDEX: 28.58 KG/M2 | HEART RATE: 75 BPM | DIASTOLIC BLOOD PRESSURE: 100 MMHG | WEIGHT: 211 LBS | HEIGHT: 72 IN | OXYGEN SATURATION: 97 % | SYSTOLIC BLOOD PRESSURE: 130 MMHG

## 2023-12-13 DIAGNOSIS — I10 ESSENTIAL HYPERTENSION: Primary | ICD-10-CM

## 2023-12-13 PROCEDURE — 99213 OFFICE O/P EST LOW 20 MIN: CPT | Performed by: NURSE PRACTITIONER

## 2023-12-13 RX ORDER — TRAZODONE HYDROCHLORIDE 150 MG/1
150 TABLET ORAL NIGHTLY PRN
COMMUNITY

## 2023-12-13 RX ORDER — METAXALONE 800 MG/1
TABLET ORAL
COMMUNITY

## 2023-12-13 RX ORDER — FINERENONE 10 MG/1
1 TABLET, FILM COATED ORAL DAILY
COMMUNITY

## 2023-12-13 RX ORDER — FLUTICASONE PROPIONATE 50 MCG
SPRAY, SUSPENSION (ML) NASAL DAILY
COMMUNITY
Start: 2023-06-12

## 2023-12-13 RX ORDER — TAMSULOSIN HYDROCHLORIDE 0.4 MG/1
CAPSULE ORAL
COMMUNITY
Start: 2023-12-11

## 2023-12-13 RX ORDER — DULOXETIN HYDROCHLORIDE 60 MG/1
CAPSULE, DELAYED RELEASE ORAL
COMMUNITY
Start: 2023-12-11

## 2023-12-13 RX ORDER — GABAPENTIN 100 MG/1
CAPSULE ORAL
COMMUNITY
Start: 2023-12-11

## 2023-12-18 ENCOUNTER — CLINICAL SUPPORT (OUTPATIENT)
Dept: CARDIOLOGY | Facility: CLINIC | Age: 63
End: 2023-12-18
Payer: COMMERCIAL

## 2023-12-18 VITALS — DIASTOLIC BLOOD PRESSURE: 86 MMHG | SYSTOLIC BLOOD PRESSURE: 132 MMHG

## 2024-05-10 DIAGNOSIS — I10 ESSENTIAL HYPERTENSION: ICD-10-CM

## 2024-05-10 NOTE — TELEPHONE ENCOUNTER
Caller: Guy Sheth    Relationship: Self    Best call back number: [phone]    Requested Prescriptions:   Requested Prescriptions     Pending Prescriptions Disp Refills    bisoprolol (ZEBeta) 10 MG tablet 90 tablet 3     Sig: Take 1 tablet by mouth Daily.        Pharmacy where request should be sent: [facility] [address] [place] [address] [phone] [facility] [phone] FX     Last office visit with prescribing clinician: 12/12/2022   Last telemedicine visit with prescribing clinician: Visit date not found   Next office visit with prescribing clinician: 12/23/2024     Additional details provided by patient: PATIENT HAS ONE PILL    Does the patient have less than a 3 day supply:  [x] Yes  [] No    Would you like a call back once the refill request has been completed: [x] Yes [] No    If the office needs to give you a call back, can they leave a voicemail: [x] Yes [] No    Manoj Lofton Rep   05/10/24 09:27 EDT

## 2024-05-14 RX ORDER — BISOPROLOL FUMARATE 10 MG/1
10 TABLET, FILM COATED ORAL DAILY
Qty: 90 TABLET | Refills: 3 | Status: SHIPPED | OUTPATIENT
Start: 2024-05-14

## 2024-05-14 NOTE — TELEPHONE ENCOUNTER
His BP was significantly elevated in the office in December.  His ultimate BP goal should be < 120/80.  Please advise him of this.

## 2024-10-28 ENCOUNTER — OFFICE VISIT (OUTPATIENT)
Age: 64
End: 2024-10-28
Payer: OTHER MISCELLANEOUS

## 2024-10-28 VITALS
HEIGHT: 72 IN | WEIGHT: 217.4 LBS | SYSTOLIC BLOOD PRESSURE: 150 MMHG | BODY MASS INDEX: 29.45 KG/M2 | DIASTOLIC BLOOD PRESSURE: 98 MMHG
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
## 2024-10-28 DIAGNOSIS — M25.512 LEFT SHOULDER PAIN, UNSPECIFIED CHRONICITY: Primary | ICD-10-CM

## 2024-10-28 DIAGNOSIS — S46.002A INJURY OF LEFT ROTATOR CUFF, INITIAL ENCOUNTER: ICD-10-CM

## 2024-10-28 PROCEDURE — 99204 OFFICE O/P NEW MOD 45 MIN: CPT | Performed by: STUDENT IN AN ORGANIZED HEALTH CARE EDUCATION/TRAINING PROGRAM

## 2024-10-28 RX ORDER — DULOXETIN HYDROCHLORIDE 60 MG/1
60 CAPSULE, DELAYED RELEASE ORAL DAILY
COMMUNITY
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
## 2024-10-30 ENCOUNTER — TELEPHONE (OUTPATIENT)
Dept: ORTHOPEDIC SURGERY | Facility: CLINIC | Age: 64
End: 2024-10-30
Payer: COMMERCIAL

## 2024-10-30 NOTE — TELEPHONE ENCOUNTER
Caller: Guy Sheth    Relationship to patient: Self    Best call back number: [phone]  [phone]    Chief complaint: LEFT KNEE     Type of visit: NEW PROBLEM     Additional notes:PATIENT SAYS HAS LEFT KNEE CONCERNS WITH WORKERS COMP AND WANTS TO GET SEEN. IS IT OK TO SCHEDULE

## 2024-11-04 ENCOUNTER — OFFICE VISIT (OUTPATIENT)
Age: 64
End: 2024-11-04
Payer: OTHER MISCELLANEOUS

## 2024-11-04 VITALS
DIASTOLIC BLOOD PRESSURE: 72 MMHG | BODY MASS INDEX: 30.43 KG/M2 | WEIGHT: 217.37 LBS | HEIGHT: 71 IN | SYSTOLIC BLOOD PRESSURE: 118 MMHG

## 2024-11-04 DIAGNOSIS — M17.12 PRIMARY OSTEOARTHRITIS OF LEFT KNEE: ICD-10-CM

## 2024-11-04 DIAGNOSIS — Z02.6 ENCOUNTER RELATED TO WORKER'S COMPENSATION CLAIM: ICD-10-CM
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
## 2024-11-04 DIAGNOSIS — M25.562 LEFT KNEE PAIN, UNSPECIFIED CHRONICITY: Primary | ICD-10-CM

## 2024-11-04 DIAGNOSIS — S83.412A SPRAIN OF MEDIAL COLLATERAL LIGAMENT OF LEFT KNEE, INITIAL ENCOUNTER: ICD-10-CM
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
## 2024-11-07 ENCOUNTER — TREATMENT (OUTPATIENT)
Dept: PHYSICAL THERAPY | Facility: CLINIC | Age: 64
End: 2024-11-07
Payer: OTHER MISCELLANEOUS

## 2024-11-07 DIAGNOSIS — M25.562 ACUTE PAIN OF LEFT KNEE: Primary | ICD-10-CM
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
## 2024-11-12 ENCOUNTER — TREATMENT (OUTPATIENT)
Dept: PHYSICAL THERAPY | Facility: CLINIC | Age: 64
End: 2024-11-12
Payer: OTHER MISCELLANEOUS

## 2024-11-12 DIAGNOSIS — M25.562 ACUTE PAIN OF LEFT KNEE: Primary | ICD-10-CM

## 2024-11-12 NOTE — PROGRESS NOTES
Physical Therapy Daily Progress Note    [facility]  [address]  [place] [address]      Patient: Guy Sheth   : 1960  Diagnosis/ICD-10 Code:  Acute pain of left knee [M25.562]  Referring practitioner: Lee Noel MD  Date of Initial Visit: Type: THERAPY  Noted: 2024  Today's Date: 2024  Patient seen for 2 sessions           Subjective   Patient reports slight increase in pain with work this week but HEP did not hurt. 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    Objective   See Exercise, Manual, and Modality Logs for complete treatment.       Assessment/Plan  Trialed eccentric loading to pes anserine structures, specifically those involved with knee flexion and hip adduction. No significant change in symptoms after these exercises, though he fatigued fairly rapidly. Added hip flexor stretch to allow patient to self manage low back pain, which may have been driven by hip flexor tone, which was likely increased with initial HEP.            Timed:  Manual Therapy:    0     mins  21690;  Therapeutic Exercise:    24     mins  45688;     Neuromuscular Arturo:   10    mins  51155;    Therapeutic Activity:     12     mins  46146;     Gait Trainin     mins  36871;     Ultrasound:     0     mins  75476;    Self Care Trainin     mins  18045;    Untimed:  Electrical Stimulation:    0     mins  25735 ( );  Mechanical Traction:    0     mins  14332;   Dry Needlin     mins   Re-evaluation                0      mins  95717;  Group Therapy              0      mins  16322;    Timed Treatment:   46   mins   Total Treatment:     46   mins    Alfonso Ricketts PT  Physical Therapist

## 2024-11-14 ENCOUNTER — TREATMENT (OUTPATIENT)
Dept: PHYSICAL THERAPY | Facility: CLINIC | Age: 64
End: 2024-11-14
Payer: COMMERCIAL

## 2024-11-14 DIAGNOSIS — M25.562 ACUTE PAIN OF LEFT KNEE: Primary | ICD-10-CM

## 2024-11-14 NOTE — PROGRESS NOTES
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 L knee pain continues to radiate along the medial aspect toward the medial joint line, in addition the HS area.    Objective   See Exercise, Manual, and Modality Logs for complete treatment.       Assessment/Plan  Able to achieve initial symptom reproduction during eccentric loading of hamstring with open chain exercise, though patient reported symptoms improved with repetition.  He reported that his knee felt looser after this exercise.  Left calf tension was a limiting factor for some exercises, so IASTM and stretching were trialed with fairly good tolerance.  Stretch was added to HEP as well.  Patient continues to struggle achieving hip flexor stretch.            Timed:  Manual Therapy:    8     mins  54082;  Therapeutic Exercise:    32     mins  32350;     Neuromuscular Arturo:   10    mins  70066;    Therapeutic Activity:     0     mins  72294;     Gait Trainin     mins  73841;     Ultrasound:     0     mins  46691;    Self Care Trainin     mins  53757;    Untimed:  Electrical Stimulation:    0     mins  22989 ( );  Mechanical Traction:    0     mins  42821;   Dry Needlin     mins   Re-evaluation                0      mins  09160;  Group Therapy              0      mins  55620;    Timed Treatment:   50   mins   Total Treatment:     50   mins    Alfonso Ricketts PT  Physical Therapist

## 2024-11-18 ENCOUNTER — TREATMENT (OUTPATIENT)
Dept: PHYSICAL THERAPY | Facility: CLINIC | Age: 64
End: 2024-11-18
Payer: COMMERCIAL

## 2024-11-18 ENCOUNTER — TELEPHONE (OUTPATIENT)
Dept: ORTHOPEDIC SURGERY | Facility: CLINIC | Age: 64
End: 2024-11-18
Payer: COMMERCIAL

## 2024-11-18 DIAGNOSIS — M25.562 ACUTE PAIN OF LEFT KNEE: Primary | ICD-10-CM

## 2024-11-18 PROCEDURE — 97110 THERAPEUTIC EXERCISES: CPT | Performed by: PHYSICAL THERAPIST

## 2024-11-18 PROCEDURE — 97140 MANUAL THERAPY 1/> REGIONS: CPT | Performed by: PHYSICAL THERAPIST

## 2024-11-18 NOTE — TELEPHONE ENCOUNTER
I let patient know I checked on his MRI and they have sent the authorization to his workers comp. I let him know they haven't approved it yet so we are still waiting. He verbalized understanding.    Constance Gonzalez CMA ([facility])

## 2024-11-18 NOTE — PROGRESS NOTES
Physical Therapy Daily Progress Note    [facility]  [address]  [place] [address]      Patient: Guy Sheth   : 1960  Diagnosis/ICD-10 Code:  Acute pain of left knee [M25.562]  Referring practitioner: Lee Noel MD  Date of Initial Visit: Type: THERAPY  Noted: 2024  Today's Date: 2024  Patient seen for 4 sessions           Subjective   Patient reports L knee is starting to feel better and looser, though the back of his knee still hurts when he stretches it into flexion.    Objective   See Exercise, Manual, and Modality Logs for complete treatment.       Assessment/Plan  Progressed eccentric loading of left hamstrings due to positive response from previous visit.  Continued IASTM to left medial gastroc as well.  Progressed left quad strengthening, both TKE and OKC, due to continued complaint of weakness stepping up.  Will progress activities to include step up next visit.            Timed:  Manual Therapy:    10     mins  79230;  Therapeutic Exercise:    40     mins  79136;     Neuromuscular Arturo:   0    mins  82222;    Therapeutic Activity:     0     mins  96466;     Gait Trainin     mins  74033;     Ultrasound:     0     mins  91576;    Self Care Trainin     mins  60348;    Untimed:  Electrical Stimulation:    0     mins  85277 ( );  Mechanical Traction:    0     mins  30515;   Dry Needlin     mins   Re-evaluation                0      mins  37711;  Group Therapy              0      mins  53513;    Timed Treatment:   50   mins   Total Treatment:     50   mins    Alfonso Ricketts PT  Physical Therapist

## 2024-11-18 NOTE — TELEPHONE ENCOUNTER
Caller: Guy Sheth    Relationship: Self    Best call back number: [phone]    What was the call regarding: PT CALLING TO GET AN UPDATE ON GETTING PT'S L SHOULDER MRI APPROVED AND READY TO SCHEDULE. MRI ORDERED BY DR YBARRA. PLEASE CONTACT PT TO DISCUSS.

## 2024-11-21 ENCOUNTER — TREATMENT (OUTPATIENT)
Dept: PHYSICAL THERAPY | Facility: CLINIC | Age: 64
End: 2024-11-21
Payer: OTHER MISCELLANEOUS

## 2024-11-21 DIAGNOSIS — M25.562 ACUTE PAIN OF LEFT KNEE: Primary | ICD-10-CM

## 2024-11-21 NOTE — PROGRESS NOTES
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    Objective   See Exercise, Manual, and Modality Logs for complete treatment.       Assessment/Plan  Continued progressive CK functional strengthening, incorporating single-leg stance stability and TKE.  Mild left knee discomfort reported during single-leg press, but symptoms did not increase with repetition and he reported no increased pain after the intervention.    Held IASTM to left gastroc due to improved symptoms.        Timed:  Manual Therapy:    0     mins  85022;  Therapeutic Exercise:    23     mins  51993;     Neuromuscular Arturo:   12    mins  63777;    Therapeutic Activity:     10     mins  80395;     Gait Trainin     mins  18830;     Ultrasound:     0     mins  74776;    Self Care Trainin     mins  32359;    Untimed:  Electrical Stimulation:    0     mins  42423 ( );  Mechanical Traction:    0     mins  11190;   Dry Needlin     mins   Re-evaluation                0      mins  63137;  Group Therapy              0      mins  45905;    Timed Treatment:   45   mins   Total Treatment:     45   mins    Alfonso Ricketts PT  Physical Therapist

## 2024-11-25 ENCOUNTER — TREATMENT (OUTPATIENT)
Dept: PHYSICAL THERAPY | Facility: CLINIC | Age: 64
End: 2024-11-25
Payer: OTHER MISCELLANEOUS

## 2024-11-25 DIAGNOSIS — M25.562 ACUTE PAIN OF LEFT KNEE: Primary | ICD-10-CM

## 2024-11-25 NOTE — PROGRESS NOTES
Physical Therapy Daily Progress Note    [facility]  [address]  [place] [address]      Patient: Guy Sheth   : 1960  Diagnosis/ICD-10 Code:  Acute pain of left knee [M25.562]  Referring practitioner: Lee Noel MD  Date of Initial Visit: Type: THERAPY  Noted: 2024  Today's Date: 2024  Patient seen for 6 sessions           Subjective   Patient reports noticing L anterior/medial knee pain yesterday with walking stairs yesterday.    Objective   See Exercise, Manual, and Modality Logs for complete treatment.       Assessment/Plan  Palpation of L medial joint reproduced his main c/o of pain. Initially, CKC loading of quads and HS were painful, but symptoms improved with repetition. Patient reported no increased pain but fatigue after treatment.            Timed:  Manual Therapy:    8     mins  15038;  Therapeutic Exercise:    18     mins  32993;     Neuromuscular Arturo:   10    mins  90737;    Therapeutic Activity:     10     mins  01773;     Gait Trainin     mins  65120;     Ultrasound:     0     mins  80065;    Self Care Trainin     mins  21573;    Untimed:  Electrical Stimulation:    0     mins  70181 ( );  Mechanical Traction:    0     mins  99090;   Dry Needlin     mins   Re-evaluation                0      mins  23919;  Group Therapy              0      mins  74360;    Timed Treatment:   46   mins   Total Treatment:     46   mins    Alfonso Ricketts PT  Physical Therapist

## 2024-11-26 RX ORDER — SODIUM, POTASSIUM,MAG SULFATES 17.5-3.13G
1 SOLUTION, RECONSTITUTED, ORAL ORAL TAKE AS DIRECTED
Qty: 354 ML | Refills: 0 | Status: SHIPPED | OUTPATIENT
Start: 2024-11-26

## 2024-11-27 ENCOUNTER — TREATMENT (OUTPATIENT)
Dept: PHYSICAL THERAPY | Facility: CLINIC | Age: 64
End: 2024-11-27
Payer: OTHER MISCELLANEOUS

## 2024-11-27 DIAGNOSIS — M25.562 ACUTE PAIN OF LEFT KNEE: Primary | ICD-10-CM

## 2024-11-27 NOTE — PROGRESS NOTES
Physical Therapy Daily Progress Note    [facility]  [address]  [place] [address]      Patient: Guy Sheth   : 1960  Diagnosis/ICD-10 Code:  Acute pain of left knee [M25.562]  Referring practitioner: Lee Noel MD  Date of Initial Visit: Type: THERAPY  Noted: 2024  Today's Date: 2024  Patient seen for 7 sessions           Subjective   Patient reports intermittent L medial knee pain with entering and exiting his truck the past few days, with muscle soreness reported today. Step up into his truck and carrying items up his stairs at home continue to hurt. His L knee feels weak, like it may give way, carrying items on steps.    Objective          Active Range of Motion   Left Knee   Flexion: WFL  Extension: WFL    Right Knee   Flexion: WFL  Extension: WFL    Passive Range of Motion   Left Knee   Flexion: WFL and with pain  Extension: WFL    Right Knee   Flexion: WFL  Extension: WFL      See Exercise, Manual, and Modality Logs for complete treatment.       Assessment/Plan  Progressed single-leg CKC motor control and strengthening.  Patient required significant PT tactile cueing to prevent excessive anterior tibial translation, hip IR, and knee valgus on the left.  He reported knee symptoms when using incorrect form, but no pain when left knee position was corrected.            Timed:  Manual Therapy:    0     mins  93151;  Therapeutic Exercise:    12     mins  27424;     Neuromuscular Arturo:   23    mins  81090;    Therapeutic Activity:     10     mins  40316;     Gait Trainin     mins  56937;     Ultrasound:     0     mins  27111;    Self Care Trainin     mins  66037;    Untimed:  Electrical Stimulation:    0     mins  96792 ( );  Mechanical Traction:    0     mins  07774;   Dry Needlin     mins   Re-evaluation                0      mins  46828;  Group Therapy              0      mins  17244;    Timed Treatment:   45    mins   Total Treatment:     45   mins    Alfonso Ricketts, PT  Physical Therapist

## 2024-12-02 ENCOUNTER — TREATMENT (OUTPATIENT)
Dept: PHYSICAL THERAPY | Facility: CLINIC | Age: 64
End: 2024-12-02
Payer: OTHER MISCELLANEOUS

## 2024-12-02 DIAGNOSIS — M25.562 ACUTE PAIN OF LEFT KNEE: Primary | ICD-10-CM

## 2024-12-02 PROCEDURE — 97112 NEUROMUSCULAR REEDUCATION: CPT | Performed by: PHYSICAL THERAPIST

## 2024-12-02 PROCEDURE — 97110 THERAPEUTIC EXERCISES: CPT | Performed by: PHYSICAL THERAPIST

## 2024-12-02 PROCEDURE — 97530 THERAPEUTIC ACTIVITIES: CPT | Performed by: PHYSICAL THERAPIST

## 2024-12-02 NOTE — PROGRESS NOTES
Re-Assessment / Re-Certification      [facility]  [address]  [place] [address]    Patient: Guy Sheth   : 1960  Diagnosis/ICD-10 Code:  Acute pain of left knee [M25.562]  Referring practitioner: Lee Nole MD  Date of Initial Visit: Type: THERAPY  Noted: 2024  Today's Date: 2024  Patient seen for 8 sessions      Subjective:     Subjective Questionnaire: LEFS: 62/80  Clinical Progress: improved  Home Program Compliance: Yes  Treatment has included: therapeutic exercise, neuromuscular re-education, manual therapy, and therapeutic activity    Subjective Evaluation    History of Present Illness  Date of onset: 2024    Subjective comment: Patient reports his L knee is feeling better but he has intermittent L posterior knee and medial patellar pain.  Patient Occupation: , works 2nd shift   Precautions and Work Restrictions: nonePain  Current pain ratin  At best pain ratin  At worst pain rating: 3  Progression: improved         Objective          Palpation   Left   Hypertonic in the distal semimembranosus and medial gastrocnemius.   Tenderness of the distal semimembranosus and medial gastrocnemius.     Tenderness   Left Knee   Tenderness in the MCL (distal), MCL (proximal) and medial joint line. No tenderness in the LCL (distal), LCL (proximal), patellar tendon and pes anserinus.     Active Range of Motion   Left Knee   Flexion: 135 degrees   Extension: WFL    Right Knee   Flexion: 135 degrees   Extension: WFL    Passive Range of Motion   Left Knee   Flexion: WFL and with pain  Extension: WFL    Right Knee   Flexion: WFL  Extension: WFL    Patellar Mobility   Left Knee Patellar tendons within functional limits include the medial, lateral, superior and inferior.     Right Knee Patellar tendons within functional limits include the medial and lateral. Hypomobile in the superior and inferior patellar tendon(s).     Strength/Myotome  Testing     Left Hip   Planes of Motion   Flexion: 5  Abduction: 4  External rotation: 5 (pain)    Right Hip   Planes of Motion   Flexion: 5  Abduction: 5  External rotation: 5    Left Knee   Flexion: 5 (pain)  Extension: 5  Quadriceps contraction: good    Right Knee   Flexion: 5  Extension: 5  Quadriceps contraction: good    Ambulation     Comments   Toes out bilaterally, slight decrease in L stance time        Assessment & Plan       Assessment  Impairments: abnormal gait, abnormal muscle firing, abnormal or restricted ROM, activity intolerance, impaired balance, impaired physical strength, lacks appropriate home exercise program, pain with function, safety issue and weight-bearing intolerance   Functional limitations: carrying objects, walking, pushing, uncomfortable because of pain, standing and stooping   Assessment details: Patient is a 64-year-old male who presents with chronic left knee pain that began on 6/6 when he stepped backward off of a truck at work, with his heel being caught in patient falling and hyperflexing his left knee.  He demonstrates full left knee ROM, but endrange flexion is painful.  He demonstrates reduced left quadriceps activation and strength.  Palpation of medial knee structures and patellar tendon reproduces much of his symptoms.    12/2- Patient demonstrating significant improvement in left knee strength, reporting significant reduction in symptom intensity/frequency and improved functional mobility.  Stepping into his truck remains moderately painful.  Barriers to therapy: Inconsistent schedule, chronicity  Prognosis: good    Goals  Plan Goals: Short term goals (4 weeks)  1. Patient to be compliant with initial HEP. Met   2. Patient to demonstrate symmetrical and pain-free knee PROM.  In progress  3. Patient to improve LEFS to greater than or equal to 50/80.  Met    Long Term goals (8 weeks)  1. Patient to demonstrate symmetrical and pain-free knee AROM.  Met  2. Patient to  demonstrate pain free and normal strength with MMTs.  Met  3. 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  In progress  4. Patient to demonstrate independence with home exercise program. In progress  5. Patient to improve LEFS to greater than or equal to 55/80. met        Plan  Therapy options: will be seen for skilled therapy services  Planned modality interventions: thermotherapy (hydrocollator packs), TENS, cryotherapy and dry needling  Planned therapy interventions: manual therapy, neuromuscular re-education, soft tissue mobilization, strengthening, stretching, therapeutic activities, joint mobilization, home exercise program, functional ROM exercises, balance/weight-bearing training, abdominal trunk stabilization, postural training, motor coordination training, spinal/joint mobilization, ADL retraining, transfer training, body mechanics training and gait training  Frequency: 2x week  Duration in weeks: 4  Treatment plan discussed with: patient          PT Signature: Alfonso Ricketts, PT  PT License [id number]    Based upon review of the patient's progress and continued therapy plan, it is my medical opinion that Guy Sheth should continue physical therapy treatment at [facility]  [address] [place] [id number] [phone].    Signature: __________________________________  Lee Noel MD    Timed:  Manual Therapy:    0     mins  62624;  Therapeutic Exercise:    25     mins  05473;     Neuromuscular Arturo:    19    mins  92857;    Therapeutic Activity:     10     mins  96951;     Gait Trainin     mins  63273;     Ultrasound:     0     mins  31957;    Self Care Trainin     mins  31213;    Untimed:  Electrical Stimulation:    0     mins  06258 ( );  Mechanical Traction:    0     mins  52259;   Dry Needlin     mins   Re-evaluation                0      mins   70250;  Group Therapy              0      mins  40207;    Timed Treatment:   54   mins   Total Treatment:     54   mins

## 2024-12-05 ENCOUNTER — TREATMENT (OUTPATIENT)
Dept: PHYSICAL THERAPY | Facility: CLINIC | Age: 64
End: 2024-12-05
Payer: OTHER MISCELLANEOUS

## 2024-12-05 DIAGNOSIS — M25.562 ACUTE PAIN OF LEFT KNEE: Primary | ICD-10-CM

## 2024-12-05 NOTE — PROGRESS NOTES
Physical Therapy Daily Progress Note    [facility]  [address]   [id number]      Patient: Guy Sheth   : 1960  Diagnosis/ICD-10 Code:  Acute pain of left knee [M25.562]  Referring practitioner: Lee Noel MD  Date of Initial Visit: Type: THERAPY  Noted: 2024  Today's Date: 2024  Patient seen for 9 sessions           Subjective   Patient reports L knee feels 85% strength.     Objective   See Exercise, Manual, and Modality Logs for complete treatment.       Assessment/Plan  Progress functional left lower extremity strengthening to a height close to the step up onto his truck.  He demonstrated fatigue with this, reporting no pain.  Attempted single-leg jumping on Pilates reformer to build quadriceps power, but eccentric control was not adequate and his left knee hyperflexed, causing immediate intense pain.  Symptoms subsided over the course of about 15 seconds and he is able to move his left knee and ambulate at PLOF with no signs or symptoms of exacerbation.            Timed:  Manual Therapy:    0     mins  99370;  Therapeutic Exercise:    25     mins  32673;     Neuromuscular Arturo:   10    mins  55417;    Therapeutic Activity:     15     mins  41261;     Gait Trainin     mins  59206;     Ultrasound:     0     mins  93677;    Self Care Trainin     mins  74700;    Untimed:  Electrical Stimulation:    0     mins  04830 ( );  Mechanical Traction:    0     mins  54570;   Dry Needlin     mins   Re-evaluation                0      mins  75867;  Group Therapy              0      mins  58293;    Timed Treatment:   50   mins   Total Treatment:     50   mins    Alfonso Ricketts PT  Physical Therapist

## 2024-12-06 ENCOUNTER — OUTSIDE FACILITY SERVICE (OUTPATIENT)
Dept: GASTROENTEROLOGY | Facility: CLINIC | Age: 64
End: 2024-12-06
Payer: COMMERCIAL

## 2024-12-06 PROCEDURE — 45385 COLONOSCOPY W/LESION REMOVAL: CPT | Performed by: INTERNAL MEDICINE

## 2024-12-06 PROCEDURE — 88305 TISSUE EXAM BY PATHOLOGIST: CPT | Performed by: INTERNAL MEDICINE

## 2024-12-06 PROCEDURE — 45380 COLONOSCOPY AND BIOPSY: CPT | Performed by: INTERNAL MEDICINE

## 2024-12-09 ENCOUNTER — TREATMENT (OUTPATIENT)
Dept: PHYSICAL THERAPY | Facility: CLINIC | Age: 64
End: 2024-12-09
Payer: OTHER MISCELLANEOUS

## 2024-12-09 ENCOUNTER — LAB REQUISITION (OUTPATIENT)
Dept: LAB | Facility: HOSPITAL | Age: 64
End: 2024-12-09
Payer: COMMERCIAL

## 2024-12-09 ENCOUNTER — TELEPHONE (OUTPATIENT)
Dept: GASTROENTEROLOGY | Facility: CLINIC | Age: 64
End: 2024-12-09
Payer: COMMERCIAL

## 2024-12-09 DIAGNOSIS — M25.562 ACUTE PAIN OF LEFT KNEE: Primary | ICD-10-CM

## 2024-12-09 DIAGNOSIS — D12.2 BENIGN NEOPLASM OF ASCENDING COLON: ICD-10-CM

## 2024-12-09 DIAGNOSIS — K64.8 OTHER HEMORRHOIDS: ICD-10-CM

## 2024-12-09 DIAGNOSIS — D12.3 BENIGN NEOPLASM OF TRANSVERSE COLON: ICD-10-CM

## 2024-12-09 DIAGNOSIS — D12.4 BENIGN NEOPLASM OF DESCENDING COLON: ICD-10-CM

## 2024-12-09 DIAGNOSIS — R19.5 OTHER FECAL ABNORMALITIES: ICD-10-CM

## 2024-12-09 DIAGNOSIS — K57.30 DIVERTICULOSIS OF LARGE INTESTINE WITHOUT PERFORATION OR ABSCESS WITHOUT BLEEDING: ICD-10-CM

## 2024-12-09 PROCEDURE — 97530 THERAPEUTIC ACTIVITIES: CPT | Performed by: PHYSICAL THERAPIST

## 2024-12-09 PROCEDURE — 97110 THERAPEUTIC EXERCISES: CPT | Performed by: PHYSICAL THERAPIST

## 2024-12-09 PROCEDURE — 97112 NEUROMUSCULAR REEDUCATION: CPT | Performed by: PHYSICAL THERAPIST

## 2024-12-09 NOTE — PROGRESS NOTES
Physical Therapy Daily Progress Note    [facility]  [address]  [place] [address]      Patient: Guy Sheth   : 1960  Diagnosis/ICD-10 Code:  Acute pain of left knee [M25.562]  Referring practitioner: Lee Noel MD  Date of Initial Visit: Type: THERAPY  Noted: 2024  Today's Date: 2024  Patient seen for 10 sessions           Subjective   Patient reports L knee feels stiff and a little swollen from Friday, but he did not apply ice over the weekend.    Objective   See Exercise, Manual, and Modality Logs for complete treatment.       Assessment/Plan  Palpation of L knee anteromedial soft tissue reproduced his current main c/o of pain. Joint line, patellofemoral joint, and pes anserinus were negative for TTP. Treatment focused on completing pain-free, low-moderate load, high repetition CKC and OKC exercises. Ice applied to L medial knee after treatment. He was instructed to apply ice to L medial knee daily until next visit.            Timed:  Manual Therapy:    0     mins  51055;  Therapeutic Exercise:    15     mins  85000;     Neuromuscular Arturo:   8    mins  71233;    Therapeutic Activity:     10     mins  60841;     Gait Trainin     mins  20680;     Ultrasound:     0     mins  57122;    Self Care Trainin     mins  83694;    Untimed:  Electrical Stimulation:    0     mins  91305 ( );  Mechanical Traction:    0     mins  20046;   Dry Needlin     mins   Re-evaluation                0      mins  47197;  Group Therapy              0      mins  29279;    Timed Treatment:   33   mins   Total Treatment:     45   mins    Alfonso Ricketts PT  Physical Therapist

## 2024-12-09 NOTE — TELEPHONE ENCOUNTER
"Patient had a procedure of Friday with Dr. Shane, and is concerned he left his glasses here.    Black [place] glasses case with \"O\" on it.  Glasses have green [employer] frames.    Best call-back number:  [phone]  "

## 2024-12-10 LAB — REF LAB TEST METHOD: NORMAL

## 2024-12-12 ENCOUNTER — TREATMENT (OUTPATIENT)
Dept: PHYSICAL THERAPY | Facility: CLINIC | Age: 64
End: 2024-12-12
Payer: OTHER MISCELLANEOUS

## 2024-12-12 DIAGNOSIS — M25.562 ACUTE PAIN OF LEFT KNEE: Primary | ICD-10-CM

## 2024-12-12 NOTE — PROGRESS NOTES
Physical Therapy Daily Progress Note    [facility]  [address]  [place] [address]      Patient: Guy Sheth   : 1960  Diagnosis/ICD-10 Code:  Acute pain of left knee [M25.562]  Referring practitioner: Lee Noel MD  Date of Initial Visit: Type: THERAPY  Noted: 2024  Today's Date: 2024  Patient seen for 11 sessions           Subjective   Patient reports L knee again feels good and has felt overall good with climbing into his truck.    Objective   See Exercise, Manual, and Modality Logs for complete treatment.       Assessment/Plan  Resumed higher-level functional strengthening activities with good tolerance.  Introduced more single-leg stance activities to challenge proprioception and single-leg stability.  He reported hip fatigue but no pain.            Timed:  Manual Therapy:    0     mins  15921;  Therapeutic Exercise:    12     mins  61761;     Neuromuscular Arturo:   23    mins  04483;    Therapeutic Activity:     11     mins  76269;     Gait Trainin     mins  98143;     Ultrasound:     0     mins  85706;    Self Care Trainin     mins  83367;    Untimed:  Electrical Stimulation:    0     mins  12958 ( );  Mechanical Traction:    0     mins  90102;   Dry Needlin     mins   Re-evaluation                0      mins  88785;  Group Therapy              0      mins  98185;    Timed Treatment:   46   mins   Total Treatment:     46   mins    Alfonso Ricketts PT  Physical Therapist

## 2024-12-13 ENCOUNTER — HOSPITAL ENCOUNTER (OUTPATIENT)
Dept: MRI IMAGING | Facility: HOSPITAL | Age: 64
Discharge: HOME | End: 2024-12-13
Admitting: STUDENT IN AN ORGANIZED HEALTH CARE EDUCATION/TRAINING PROGRAM
Payer: OTHER MISCELLANEOUS

## 2024-12-13 ENCOUNTER — TELEPHONE (OUTPATIENT)
Dept: GASTROENTEROLOGY | Facility: CLINIC | Age: 64
End: 2024-12-13
Payer: COMMERCIAL
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
## 2024-12-13 DIAGNOSIS — S46.002A INJURY OF LEFT ROTATOR CUFF, INITIAL ENCOUNTER: ICD-10-CM

## 2024-12-13 PROCEDURE — 73221 MRI JOINT UPR EXTREM W/O DYE: CPT

## 2024-12-13 NOTE — TELEPHONE ENCOUNTER
----- Message from Canelo Shane sent at 12/10/2024  4:10 PM EST -----  Let Mr. Sheth know that 3  polyps were adenoma type.  He will need a repeat colonoscopy in 3 years.

## 2024-12-16 ENCOUNTER — TREATMENT (OUTPATIENT)
Dept: PHYSICAL THERAPY | Facility: CLINIC | Age: 64
End: 2024-12-16
Payer: OTHER MISCELLANEOUS

## 2024-12-16 DIAGNOSIS — M25.562 ACUTE PAIN OF LEFT KNEE: Primary | ICD-10-CM

## 2024-12-16 PROCEDURE — 97530 THERAPEUTIC ACTIVITIES: CPT | Performed by: PHYSICAL THERAPIST

## 2024-12-16 PROCEDURE — 97112 NEUROMUSCULAR REEDUCATION: CPT | Performed by: PHYSICAL THERAPIST

## 2024-12-16 PROCEDURE — 97110 THERAPEUTIC EXERCISES: CPT | Performed by: PHYSICAL THERAPIST

## 2024-12-16 NOTE — PROGRESS NOTES
Physical Therapy Daily Progress Note    [facility]  [address]  [place] [address]      Patient: Guy Sheth   : 1960  Diagnosis/ICD-10 Code:  Acute pain of left knee [M25.562]  Referring practitioner: Lee Noel MD  Date of Initial Visit: Type: THERAPY  Noted: 2024  Today's Date: 2024  Patient seen for 12 sessions           Subjective   Patient reports his L LE still feels somewhat weak on stairs.    Objective   See Exercise, Manual, and Modality Logs for complete treatment.       Assessment/Plan  Continue with functional strengthening and single-leg stability training.  Patient reported significant lower extremity fatigue after treatment.            Timed:  Manual Therapy:    0     mins  67332;  Therapeutic Exercise:    15     mins  34560;     Neuromuscular Arturo:   15    mins  66743;    Therapeutic Activity:     10     mins  89933;     Gait Trainin     mins  30674;     Ultrasound:     0     mins  41526;    Self Care Trainin     mins  08629;    Untimed:  Electrical Stimulation:    0     mins  63018 ( );  Mechanical Traction:    0     mins  67941;   Dry Needlin     mins   Re-evaluation                0      mins  78145;  Group Therapy              0      mins  96512;    Timed Treatment:   40   mins   Total Treatment:     40   mins    Alfonso Ricketts PT  Physical Therapist

## 2024-12-18 ENCOUNTER — TELEPHONE (OUTPATIENT)
Dept: ORTHOPEDIC SURGERY | Facility: CLINIC | Age: 64
End: 2024-12-18

## 2024-12-18 NOTE — TELEPHONE ENCOUNTER
Caller: Guy Sheth    Relationship: Self    Best call back number: [phone]    Caller requesting test results: PATIENT    What test was performed: MRI LT SHLDR    When was the test performed: 12.13.24    Where was the test performed: [facility]

## 2024-12-19 ENCOUNTER — TREATMENT (OUTPATIENT)
Dept: PHYSICAL THERAPY | Facility: CLINIC | Age: 64
End: 2024-12-19
Payer: OTHER MISCELLANEOUS

## 2024-12-19 DIAGNOSIS — M25.562 ACUTE PAIN OF LEFT KNEE: Primary | ICD-10-CM

## 2024-12-19 NOTE — PROGRESS NOTES
Physical Therapy Daily Progress Note    [facility]  [address]  [place] [address]      Patient: Guy Sheth   : 1960  Diagnosis/ICD-10 Code:  Acute pain of left knee [M25.562]  Referring practitioner: Lee Noel MD  Date of Initial Visit: Type: THERAPY  Noted: 2024  Today's Date: 2024  Patient seen for 13 sessions           Subjective   Patient reports experiencing setback with left knee this week.  He was driving for 45 minutes with left knee flexed and tucked under his right leg.  He reports significant pain when attempting to straighten his left knee out of this position, since which he has experienced increased left knee symptoms.    Objective   See Exercise, Manual, and Modality Logs for complete treatment.       Assessment/Plan  Decreased left knee flexion ROM with pain.  Left knee medial joint line, MCL, and MPFL are TTP, reproducing familiar symptoms.  Patient's overall exercise tolerance today was low, even for knee AAROM activities.  Ice applied for the end of treatment and he was instructed to apply ice daily until his next visit.            Timed:  Manual Therapy:    0     mins  77049;  Therapeutic Exercise:    15     mins  03028;     Neuromuscular Arturo:   10    mins  38715;    Therapeutic Activity:     10     mins  41236;     Gait Trainin     mins  66654;     Ultrasound:     0     mins  39665;    Self Care Trainin     mins  63872;    Untimed:  Electrical Stimulation:    0     mins  71834 ( );  Mechanical Traction:    0     mins  04593;   Dry Needlin     mins   Re-evaluation                0      mins  40631;  Group Therapy              0      mins  11565;    Timed Treatment:   35   mins   Total Treatment:     50   mins    Alfonso Ricketts PT  Physical Therapist

## 2024-12-30 ENCOUNTER — OFFICE VISIT (OUTPATIENT)
Age: 64
End: 2024-12-30
Payer: OTHER MISCELLANEOUS

## 2024-12-30 VITALS
BODY MASS INDEX: 29.82 KG/M2 | HEIGHT: 71 IN | DIASTOLIC BLOOD PRESSURE: 78 MMHG | SYSTOLIC BLOOD PRESSURE: 112 MMHG | WEIGHT: 213 LBS

## 2024-12-30 DIAGNOSIS — S46.012A TRAUMATIC INCOMPLETE TEAR OF LEFT ROTATOR CUFF, INITIAL ENCOUNTER: Primary | ICD-10-CM

## 2024-12-30 DIAGNOSIS — M67.922 TENDINOPATHY OF LEFT BICEPS TENDON: ICD-10-CM

## 2024-12-30 RX ORDER — LIDOCAINE HYDROCHLORIDE 10 MG/ML
4 INJECTION, SOLUTION EPIDURAL; INFILTRATION; INTRACAUDAL; PERINEURAL
Status: COMPLETED | OUTPATIENT
Start: 2024-12-30 | End: 2024-12-30

## 2024-12-30 RX ORDER — TRIAMCINOLONE ACETONIDE 40 MG/ML
80 INJECTION, SUSPENSION INTRA-ARTICULAR; INTRAMUSCULAR
Status: COMPLETED | OUTPATIENT
Start: 2024-12-30 | End: 2024-12-30

## 2024-12-30 RX ORDER — BUPIVACAINE HYDROCHLORIDE 5 MG/ML
4 INJECTION, SOLUTION EPIDURAL; INTRACAUDAL
Status: COMPLETED | OUTPATIENT
Start: 2024-12-30 | End: 2024-12-30

## 2024-12-30 RX ADMIN — LIDOCAINE HYDROCHLORIDE 4 ML: 10 INJECTION, SOLUTION EPIDURAL; INFILTRATION; INTRACAUDAL; PERINEURAL at 13:18

## 2024-12-30 RX ADMIN — BUPIVACAINE HYDROCHLORIDE 4 ML: 5 INJECTION, SOLUTION EPIDURAL; INTRACAUDAL at 13:18

## 2024-12-30 RX ADMIN — TRIAMCINOLONE ACETONIDE 80 MG: 40 INJECTION, SUSPENSION INTRA-ARTICULAR; INTRAMUSCULAR at 13:18

## 2024-12-30 NOTE — PROGRESS NOTES
[facility] Office Follow Up Visit     Office Follow Up      Date: 12/30/2024   Patient Name: Guy Sheth  MRN: [id number]  YOB: 1960    Referring Physician: No ref. provider found     Chief Complaint:   Chief Complaint   Patient presents with    Left Knee - Follow-up     8 week follow up; Left knee pain    Follow-up     9 week follow up; Left shoulder pain-MRI completed on 12/13/24     History of Present Illness: Guy Sheth is a 64 y.o. male who returns to clinic today for follow up on left shoulder pain. His pain is a 8-10 /10 on the pain scale. Patient has tried the following previous treatments nothing. He mentions current symptoms of same as prior . He states that these treatments have worsened.    Subjective     Review of Systems: Review of Systems   Constitutional:  Negative for chills, fever, unexpected weight gain and unexpected weight loss.   HENT:  Negative for congestion, postnasal drip and rhinorrhea.    Eyes:  Negative for blurred vision.   Respiratory:  Negative for shortness of breath.    Cardiovascular:  Negative for leg swelling.   Gastrointestinal:  Negative for abdominal pain, nausea and vomiting.   Genitourinary:  Negative for difficulty urinating.   Musculoskeletal:  Positive for arthralgias. Negative for gait problem, joint swelling and myalgias.   Skin:  Negative for skin lesions and wound.   Neurological:  Negative for dizziness, weakness, light-headedness and numbness.   Hematological:  Does not bruise/bleed easily.   Psychiatric/Behavioral:  Negative for depressed mood.         Medications:   Current Outpatient Medications:     aspirin  MG tablet, Take 1 tablet by mouth Daily., Disp: , Rfl:     bisoprolol (ZEBeta) 10 MG tablet, Take 1 tablet by mouth Daily., Disp: 90 tablet, Rfl: 3    cetirizine (zyrTEC) 10 MG tablet, Take 1 tablet by mouth Daily., Disp: , Rfl:     cloNIDine (Catapres) 0.1 MG tablet, Take 1  tablet by mouth Every 8 (Eight) Hours As Needed for High Blood Pressure. SBP>150 MM/HG & OR DBP> 95 MM/HG, Disp: 90 tablet, Rfl: 3    Crestor 20 MG tablet, Take 1 tablet by mouth Daily., Disp: 30 tablet, Rfl: 4    DULoxetine (CYMBALTA) 60 MG capsule, Take 1 capsule by mouth Daily., Disp: , Rfl:     famotidine (PEPCID) 20 MG tablet, , Disp: , Rfl:     Farxiga 10 MG tablet, , Disp: , Rfl:     fexofenadine (ALLEGRA) 180 MG tablet, Take 1 tablet by mouth Daily As Needed., Disp: , Rfl:     Kerendia 10 MG tablet, Take 1 tablet by mouth Daily., Disp: , Rfl:     linaclotide (LINZESS) 290 MCG capsule capsule, Every Morning, Disp: , Rfl:     metaxalone (SKELAXIN) 800 MG tablet, TAKE 1 TABLET BY MOUTH THREE TIMES DAILY AS NEEDED FOR MUSCLE PAIN, Disp: , Rfl:     metFORMIN ER (GLUCOPHAGE-XR) 500 MG 24 hr tablet, Take 1 tablet by mouth Daily As Needed., Disp: , Rfl:     Olmesartan-amLODIPine-HCTZ 20-5-12.5 MG tablet, Take 1 tablet by mouth 2 (Two) Times a Day., Disp: 180 tablet, Rfl: 3    pantoprazole (PROTONIX) 40 MG EC tablet, Take 1 tablet by mouth Daily., Disp: , Rfl:     tamsulosin (FLOMAX) 0.4 MG capsule 24 hr capsule, , Disp: , Rfl:     temazepam (RESTORIL) 15 MG capsule, Take 1 capsule by mouth 2 (Two) Times a Day., Disp: , Rfl:     traZODone (DESYREL) 150 MG tablet, Take 1 tablet by mouth At Night As Needed for Sleep., Disp: , Rfl:     Allergies:   Allergies   Allergen Reactions    Levaquin [Levofloxacin] Itching and GI Intolerance    Crestor [Rosuvastatin] Myalgia     Generic     Amoxicillin-Pot Clavulanate Hives and Itching    Codeine     Contrast Dye (Echo Or Unknown Ct/Mr)     Hydrocodone Other (See Comments)    Lopressor [Metoprolol Tartrate]     Medrol [Methylprednisolone] Other (See Comments)     Blood in stool    Morphine And Codeine     Prednisone Other (See Comments)    Sulfa Antibiotics     Lortab [Hydrocodone-Acetaminophen] Rash     Nose bleed       I have reviewed and updated the patient's chief complaint,  "history of present illness, review of systems, past medical history, surgical history, family history, social history, medications and allergy list as appropriate.     Objective      Vital Signs:   Vitals:    12/30/24 1304   BP: 112/78   Weight: 96.6 kg (213 lb)   Height: 181.6 cm (71.5\")     Body mass index is 29.3 kg/m².  BMI is >= 25 and <30. (Overweight) The following options were offered after discussion;: exercise counseling/recommendations and nutrition counseling/recommendations     Patient reports that he is a former smoker. He quit smoking in 2003.  He has not resumed smoking since that time.  This behavior was applauded and she was encouraged to continue in smoking cessation.  We will continue to monitor at subsequent visits.     Ortho Exam:  Cardiovascular System: Arterial Pulses Right: radial normal. Arterial Pulses Left: radial normal.   C-Spine/Neck: Active Range of Motion: no crepitus or pain elicited on motion and flexion normal, extension normal, rotation normal, and lateral flexion normal.   Shoulders: Inspection Right: no misalignment, erythema, induration, swelling, or warmth and AC prominence normal. Inspection Left: no misalignment, erythema, induration, swelling, or warmth and AC prominence normal. Bony Palpation Right: no tenderness of the clavicle, the acromioclavicular joint, the greater tuberosity, or the bicipital groove. Bony Palpation Left: tenderness of the clavicle lateral one-third, the acromioclavicular joint, the greater tuberosity, and the bicipital groove and no tenderness of the sternoclavicular joint. Soft Tissue Palpation Left: tenderness of the supraspinatus, the infraspinatus, the glenohumeral joint region, and the lateral cuff insertion. Active Range of Motion Left: limited. Special Tests Right: Hawkin's test positive and empty can sign positive. Special Tests Left: Hawkin's test positive, Neer's test positive, Foard's test positive, and empty can sign positive.   exam " RIGHT shoulder: forward flexion approximately 140 degrees. Abduction 140 degrees. Internal rotation L4. Motor testing supraspinatus 5/5  exam LEFT shoulder: forward flexion approximately 140 degrees. Abduction 80 degrees. Internal rotation SI. Motor testing supraspinatus 4/5    Results Review:   Imaging Results (Last 24 Hours)       ** No results found for the last 24 hours. **        MRI SHOULDER LEFT WO CONTRAST     Date of Exam: 12/13/2024 3:19 PM EST     Indication: fall, left rotator cuff injury. Patient reports injury in June 2024     Comparison: Shoulder x-ray 10/28/2024     Technique:  Routine multiplanar/multisequence images of the left shoulder were obtained without contrast administration.          Findings:  Rotator cuff:  Supraspinatus tendon: Moderate tendinosis. Small intrasubstance tear measures about 3 mm mediolateral by 4 mm anterior posterior. This involves about 50% tendon thickness. No significant muscle atrophy.  Infraspinatus tendon: Moderate distal tendinosis. No significant muscle atrophy.  Subscapularis tendon: High-grade partial-thickness articular surface tear of the subscapularis tendon measures 1.7 cm mediolateral by 2.2 cm cranial caudal. This involves about 80% tendon thickness. Inferior muscular fibers are tendonotic but intact.. No   significant muscle atrophy.  Teres minor tendon: No tendon abnormality. No significant muscle atrophy.     Glenohumeral joint:  Humeral head is posteriorly located within the glenoid. Physiologic joint fluid is present. There is moderate joint space narrowing and cartilage loss. Possible remote Bankart lesion. The inferior joint capsule is thickened and has increased signal   intensity.     Labrum:  There is degenerative tearing of the labrum.. No paralabral cysts.     Biceps tendon:  Biceps tendon is abnormal. It is medially perched. There is partial-thickness interstitial tearing.     Acromioclavicular Joint:  Normal for age. Mild degenerative change.  No abnormal bone marrow edema. No os acromiale. No significant lateral downsloping of the acromion.     Bone:  No fractures or aggressive osseous lesions. Bone marrow signal intensity is normal.     Miscellaneous:  Trace amount subacromial subdeltoid fluid. No pathologically enlarged axillary lymph nodes. Deltoid muscle has normal size and signal intensity.     IMPRESSION:  1.There is a moderate size, high-grade partial-thickness articular surface tear of the subscapularis tendon.  2.There is a small, partial-thickness intrasubstance tear of the supraspinatus tendon.  3.There is moderate tendinosis of the infraspinatus tendon.  4.There is moderate arthritis glenohumeral joint with degenerative tearing of the labrum.   5.Probable nonacute Bankart lesion anterior inferior glenoid/labrum. CT could be performed if better evaluation of osseous involvement is desired.  6.The inferior joint capsule is thickened and has increased signal intensity. This is nonspecific, but can be seen with adhesive capsulitis.  7.Mild, age-appropriate degenerative change acromioclavicular joint.  8.Trace amount subacromial-subdeltoid bursal fluid.  9.Partial-thickness interstitial tearing of the long head biceps tendon.    Procedures    Assessment / Plan      Assessment/Plan:   Diagnoses and all orders for this visit:    1. Traumatic incomplete tear of left rotator cuff, initial encounter (Primary)  -     Ambulatory Referral to Physical Therapy for Evaluation & Treatment    2. Tendinopathy of left biceps tendon  -     Ambulatory Referral to Physical Therapy for Evaluation & Treatment    Other orders  -     - Large Joint Arthrocentesis: L subacromial bursa    Follow-up on left shoulder injury.  MRI results show partial-thickness tears of the subscapularis, supraspinatus as well as tendinosis of the infraspinatus.  There is also partial-thickness interstitial tearing of the long head of the biceps.  The latter of which may be causing him the  most issue.  He has good range of motion and forward flexion but is limited in abduction.  We explained MRI findings and how they correlate to the symptoms that are being experienced.  He has had good response to physical therapy in the left knee as result from this injury.  We will redirect therapy to the left shoulder today.  I will also inject cortisone into the left subacromial bursa in the office today.  He will follow-up in 8 weeks.    Follow Up:   Return in about 8 weeks (around 2/24/2025) for Recheck.      Mann Noel MD  [facility]

## 2024-12-30 NOTE — PROGRESS NOTES
Procedure   - Large Joint Arthrocentesis: L subacromial bursa on 12/30/2024 1:18 PM  Indications: pain  Details: 21 G needle, posterior approach  Medications: 80 mg triamcinolone acetonide 40 MG/ML; 4 mL bupivacaine (PF) 0.5 %; 4 mL lidocaine PF 1% 1 %  Outcome: tolerated well, no immediate complications  Procedure, treatment alternatives, risks and benefits explained, specific risks discussed. Consent was given by the patient. Immediately prior to procedure a time out was called to verify the correct patient, procedure, equipment, support staff and site/side marked as required. Patient was prepped and draped in the usual sterile fashion.

## 2025-01-02 ENCOUNTER — TREATMENT (OUTPATIENT)
Dept: PHYSICAL THERAPY | Facility: CLINIC | Age: 65
End: 2025-01-02
Payer: OTHER MISCELLANEOUS

## 2025-01-02 DIAGNOSIS — G89.29 CHRONIC LEFT SHOULDER PAIN: Primary | ICD-10-CM

## 2025-01-02 DIAGNOSIS — M25.562 ACUTE PAIN OF LEFT KNEE: ICD-10-CM

## 2025-01-02 DIAGNOSIS — M25.512 CHRONIC LEFT SHOULDER PAIN: Primary | ICD-10-CM

## 2025-01-02 NOTE — PROGRESS NOTES
Re-Assessment / Re-Certification      [facility]  [address]  [place] [address]    Patient: Guy Sheth   : 1960  Diagnosis/ICD-10 Code:  Chronic left shoulder pain [M25.512, G89.29]  Referring practitioner: Lee Noel MD  Date of Initial Visit: Type: THERAPY  Noted: 2024  Today's Date: 2025  Patient seen for 14 sessions      Subjective:     Subjective Questionnaire: QuickDASH: 71%    Subjective Evaluation    History of Present Illness  Date of onset: 2024  Mechanism of injury: L shoulder pain. Patient stepped backward off a truck and his L heel was caught, forcing him to hyperflex his L knee and land on his L forearm, pushing his L shoulder toward his ear. He experienced significant pain in his L shoulder since then, especially with use, sitting with arm supported and elevated, lifting, and reaching behind his back. He received an injection 3 days ago with significant relief. MRI revealed moderate L subscap tear, mild L supraspinatus tear, biceps tendon/infraspinatus/teres minor tendinopathy, and moderate GH OA.    CLOF: pain and difficulty opening/closing container doors    Subjective comment: Patient reports his L knee is feeling better but he has intermittent L posterior knee and medial patellar pain.  Patient Occupation: , works 2nd shift   Precautions and Work Restrictions: nonePain  Current pain ratin  At best pain ratin  At worst pain ratin    Hand dominance: right    Patient Goals  Patient goals for therapy: return to work, increased motion, increased strength, independence with ADLs/IADLs and decreased pain         Objective          Palpation   Left   Hypertonic in the infraspinatus.   Tenderness of the infraspinatus.     Tenderness     Left Shoulder   Tenderness in the biceps tendon (proximal), bicipital groove and infraspinatus tendon.     Active Range of Motion   Left Shoulder   Flexion: 135 degrees with  pain  Abduction: 90 degrees with pain  External rotation BTH: T2 with pain  Internal rotation BTB: L5 with pain  Horizontal adduction: Left shoulder active horizontal adduction: touches R scapular spine, pain.     Right Shoulder   Flexion: 165 degrees   Abduction: 170 degrees   External rotation BTH: T3   Internal rotation BTB: L2   Horizontal adduction: Right shoulder active horizontal adduction: touches L medial scapular boarder 1/2 way.     Passive Range of Motion   Left Shoulder   Flexion: 130 degrees with pain  Abduction: 130 degrees with pain  External rotation 90°: 90 degrees with pain  Internal rotation 90°: 20 degrees with pain    Joint Play   Left Shoulder  Joints within functional limits are the inferior capsule. Hypomobile in the anterior capsule and posterior capsule.    Right Shoulder  Joints within functional limits are the anterior capsule, posterior capsule and inferior capsule.     Strength/Myotome Testing     Left Shoulder     Planes of Motion   Extension: 4+   Abduction: 4+ (pain)   External rotation at 0°: 4+ (pain)   Internal rotation at 0°: 4- (pain)     Isolated Muscles   Biceps: 4 (pain)   Triceps: 5     Right Shoulder     Planes of Motion   Extension: 5   Abduction: 5   External rotation at 0°: 4+   Internal rotation at 0°: 4+       Assessment & Plan       Assessment  Impairments: abnormal gait, abnormal muscle firing, abnormal or restricted ROM, activity intolerance, impaired balance, impaired physical strength, lacks appropriate home exercise program, pain with function, safety issue and weight-bearing intolerance   Functional limitations: carrying objects, walking, pushing, uncomfortable because of pain, standing and stooping   Assessment details: Patient is a 64-year-old male who presents with chronic left knee pain that began on 6/6 when he stepped backward off of a truck at work, with his heel being caught in patient falling and hyperflexing his left knee.  He demonstrates full left  knee ROM, but endrange flexion is painful.  He demonstrates reduced left quadriceps activation and strength.  Palpation of medial knee structures and patellar tendon reproduces much of his symptoms.    12/2- Patient demonstrating significant improvement in left knee strength, reporting significant reduction in symptom intensity/frequency and improved functional mobility.  Stepping into his truck remains moderately painful.    1/2- Added L shoulder to POC.  He demonstrates restricted and painful left shoulder abduction AROM/PROM and IR, with endrange PROM flexion and abduction also limited.  Left shoulder pain and weakness noted with resisted IR and abduction.  Imaging revealed left subscapularis and supraspinatus injuries varying severity, left biceps tendon subluxation, and infraspinatus/teres minor tendinopathy.  Barriers to therapy: Inconsistent schedule, chronicity  Prognosis: good    Goals  Plan Goals: Short term goals (4 weeks)  1. Patient to be compliant with initial HEP. Met   2. Patient to demonstrate symmetrical and pain-free knee PROM.  In progress  3. Patient to improve LEFS to greater than or equal to 50/80.  Met    Long Term goals (8 weeks)  1. Patient to demonstrate symmetrical and pain-free knee AROM.  Met  2. Patient to demonstrate pain free and normal strength with MMTs.  Met  3. 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  In progress  4. Patient to demonstrate independence with home exercise program. In progress  5. Patient to improve LEFS to greater than or equal to 55/80. met      New Goals (8 weeks)  Short Term Goals (4 wks)  1. Patient will improve Quick Dash score to < 60 %.  2. Pt will demonstrate symmetrical, pain-free shoulder AROM.  3. Patient will be independent and compliant with initial home exercise program.     Long Term Goals (8 wks)  1. Patient to demonstrate normal and pain free UE strength with MMTs.  2. Patient will improve Quick  Dash score to < 45 %.  3.  Patient to report awakening less than 3X/week due to left shoulder pain.  4. Pt. will be independent and compliant with advanced home exercise program to facilitate self-management of symptoms.  5.  Patient to report completing all required work duties without functional impairment of left shoulder.      Plan  Therapy options: will be seen for skilled therapy services  Planned modality interventions: thermotherapy (hydrocollator packs), TENS, cryotherapy and dry needling  Planned therapy interventions: manual therapy, neuromuscular re-education, soft tissue mobilization, strengthening, stretching, therapeutic activities, joint mobilization, home exercise program, functional ROM exercises, balance/weight-bearing training, abdominal trunk stabilization, postural training, motor coordination training, spinal/joint mobilization, ADL retraining, transfer training, body mechanics training and gait training  Frequency: 2x week  Duration in weeks: 8  Treatment plan discussed with: patient          PT Signature: Alfonso Ricketts, PT  PT License [id number]    Based upon review of the patient's progress and continued therapy plan, it is my medical opinion that Guy Sheth should continue physical therapy treatment at [facility]  [address] [place] [id number] [phone].    Signature: __________________________________  Lee Noel MD    Timed:  Manual Therapy:    0     mins  15464;  Therapeutic Exercise:    0     mins  22433;     Neuromuscular Arturo:    10    mins  69016;    Therapeutic Activity:     15     mins  28761;     Gait Trainin     mins  80686;     Ultrasound:     0     mins  87102;    Self Care Trainin     mins  18891;    Untimed:  Electrical Stimulation:    0     mins  37655 ( );  Mechanical Traction:    0     mins  53546;   Dry Needlin     mins   Re-evaluation                30      mins   44332;  Group Therapy              0      mins  68942;    Timed Treatment:   25   mins   Total Treatment:     55   mins

## 2025-01-09 ENCOUNTER — TREATMENT (OUTPATIENT)
Dept: PHYSICAL THERAPY | Facility: CLINIC | Age: 65
End: 2025-01-09
Payer: OTHER MISCELLANEOUS

## 2025-01-09 DIAGNOSIS — G89.29 CHRONIC LEFT SHOULDER PAIN: Primary | ICD-10-CM

## 2025-01-09 DIAGNOSIS — M25.562 ACUTE PAIN OF LEFT KNEE: ICD-10-CM

## 2025-01-09 DIAGNOSIS — M25.512 CHRONIC LEFT SHOULDER PAIN: Primary | ICD-10-CM

## 2025-01-09 PROCEDURE — 97110 THERAPEUTIC EXERCISES: CPT | Performed by: PHYSICAL THERAPIST

## 2025-01-09 PROCEDURE — 97112 NEUROMUSCULAR REEDUCATION: CPT | Performed by: PHYSICAL THERAPIST

## 2025-01-09 PROCEDURE — 97140 MANUAL THERAPY 1/> REGIONS: CPT | Performed by: PHYSICAL THERAPIST

## 2025-01-09 NOTE — PROGRESS NOTES
Physical Therapy Daily Progress Note    [facility]  [address]  [place] [address]      Patient: Guy Sheth   : 1960  Diagnosis/ICD-10 Code:  Chronic left shoulder pain [M25.512, G89.29]  Referring practitioner: Lee Noel MD  Date of Initial Visit: Type: THERAPY  Noted: 2024  Today's Date: 2025  Patient seen for 15 sessions           Subjective   Patient reports no change in L shoulder symptoms since last visit.    Objective   See Exercise, Manual, and Modality Logs for complete treatment.       Assessment/Plan  L shoulder PROM remains limited by pain and not stiffness, with little change compared to last visit.    Modified L shoulder routine to focus on stabilization instead of mobility, with which he reported fatigue and much less pain.  Trialed IASTM to left shoulder external rotators, after which she reported increased shoulder pain.  Moist heat applied to left shoulder after treatment.        Timed:  Manual Therapy:    23     mins  34428;  Therapeutic Exercise:    12     mins  29547;     Neuromuscular Arturo:   15    mins  32505;    Therapeutic Activity:     0     mins  06634;     Gait Trainin     mins  05348;     Ultrasound:     0     mins  75958;    Self Care Trainin     mins  99630;    Untimed:  Electrical Stimulation:    0     mins  22024 ( );  Mechanical Traction:    0     mins  07941;   Dry Needlin     mins   Re-evaluation                0      mins  79289;  Group Therapy              0      mins  87228;    Timed Treatment:   50   mins   Total Treatment:     65   mins    Alfonso Ricketts PT  Physical Therapist

## 2025-01-13 ENCOUNTER — TREATMENT (OUTPATIENT)
Dept: PHYSICAL THERAPY | Facility: CLINIC | Age: 65
End: 2025-01-13
Payer: OTHER MISCELLANEOUS

## 2025-01-13 DIAGNOSIS — G89.29 CHRONIC LEFT SHOULDER PAIN: Primary | ICD-10-CM

## 2025-01-13 DIAGNOSIS — M25.512 CHRONIC LEFT SHOULDER PAIN: Primary | ICD-10-CM

## 2025-01-13 DIAGNOSIS — M25.562 ACUTE PAIN OF LEFT KNEE: ICD-10-CM

## 2025-01-13 PROCEDURE — 97112 NEUROMUSCULAR REEDUCATION: CPT | Performed by: PHYSICAL THERAPIST

## 2025-01-13 PROCEDURE — 97530 THERAPEUTIC ACTIVITIES: CPT | Performed by: PHYSICAL THERAPIST

## 2025-01-13 PROCEDURE — 97164 PT RE-EVAL EST PLAN CARE: CPT | Performed by: PHYSICAL THERAPIST

## 2025-01-13 PROCEDURE — 97110 THERAPEUTIC EXERCISES: CPT | Performed by: PHYSICAL THERAPIST

## 2025-01-13 NOTE — PROGRESS NOTES
Re-Assessment / Re-Certification      [facility]  [address]  [place] [address]    Patient: Guy Sheth   : 1960  Diagnosis/ICD-10 Code:  Chronic left shoulder pain [M25.512, G89.29]  Referring practitioner: Lee Noel MD  Date of Initial Visit: Type: THERAPY  Noted: 2024  Today's Date: 2025  Patient seen for 16 sessions      Subjective:     Clinical Progress: worse  Home Program Compliance: Yes  Treatment has included: therapeutic exercise, neuromuscular re-education, manual therapy, and therapeutic activity    Subjective Evaluation    History of Present Illness  Date of onset: 2024    Subjective comment: Patient reports falling on ice while at work on 1/10. He was between trucks on an icy surface and fell forward, land in a near push-up position. L shoulder has hurt worse since then.  Patient Occupation: , works 2nd shift   Precautions and Work Restrictions: nonePain  Current pain ratin  At best pain ratin  At worst pain ratin         Objective          Palpation   Left   Tenderness of the infraspinatus and supraspinatus.     Tenderness     Left Shoulder   Tenderness in the biceps tendon (proximal), bicipital groove, infraspinatus tendon and supraspinatus tendon.     Active Range of Motion   Left Shoulder   Flexion: 125 degrees with pain  Abduction: 55 degrees with pain  External rotation BTH: T2 with pain  Internal rotation BTB: L5 with pain    Right Shoulder   Flexion: 165 degrees   Abduction: 170 degrees   External rotation BTH: T3   Internal rotation BTB: L2   Horizontal adduction: Right shoulder active horizontal adduction: touches L medial scapular boarder 1/2 way.     Passive Range of Motion   Left Shoulder   Flexion: 130 degrees with pain  Abduction: 110 degrees with pain  External rotation 90°: 70 degrees with pain  Internal rotation 90°: 20 degrees with pain    Joint Play   Left Shoulder  Joints within  functional limits are the inferior capsule. Hypomobile in the anterior capsule and posterior capsule.    Right Shoulder  Joints within functional limits are the anterior capsule, posterior capsule and inferior capsule.     Strength/Myotome Testing     Left Shoulder     Planes of Motion   Extension: 4- (pain)   Abduction: 3- (pain, due to limited ROM)   External rotation at 0°: 4 (pain)   Internal rotation at 0°: 4- (pain)     Isolated Muscles   Biceps: 4 (pain)     Right Shoulder     Planes of Motion   Extension: 5   Abduction: 5   External rotation at 0°: 4+   Internal rotation at 0°: 4+       Assessment & Plan       Assessment  Impairments: abnormal gait, abnormal muscle firing, abnormal or restricted ROM, activity intolerance, impaired balance, impaired physical strength, lacks appropriate home exercise program, pain with function, safety issue and weight-bearing intolerance   Functional limitations: carrying objects, walking, pushing, uncomfortable because of pain, standing and stooping   Assessment details: Patient is a 64-year-old male who presents with chronic left knee pain that began on 6/6 when he stepped backward off of a truck at work, with his heel being caught in patient falling and hyperflexing his left knee.  He demonstrates full left knee ROM, but endrange flexion is painful.  He demonstrates reduced left quadriceps activation and strength.  Palpation of medial knee structures and patellar tendon reproduces much of his symptoms.    12/2- Patient demonstrating significant improvement in left knee strength, reporting significant reduction in symptom intensity/frequency and improved functional mobility.  Stepping into his truck remains moderately painful.    1/2- Added L shoulder to POC.  He demonstrates restricted and painful left shoulder abduction AROM/PROM and IR, with endrange PROM flexion and abduction also limited.  Left shoulder pain and weakness noted with resisted IR and abduction.  Imaging  revealed left subscapularis and supraspinatus injuries varying severity, left biceps tendon subluxation, and infraspinatus/teres minor tendinopathy.    1/13- Reassessed due to recent change in patient status after falling and landing on his arms.  He demonstrates decline in left shoulder abduction AROM/PROM, in addition to strength into extension, abduction, IR, ER, and elbow flexion.  Referring provider was contacted and advised further monitoring at his next follow-up PT visit before deciding whether patient needs to follow-up with Ortho prior to his currently scheduled appointment.  Barriers to therapy: Inconsistent schedule, chronicity  Prognosis: good    Goals  Plan Goals: Short term goals (4 weeks)  1. Patient to be compliant with initial HEP. Met   2. Patient to demonstrate symmetrical and pain-free knee PROM.  In progress  3. Patient to improve LEFS to greater than or equal to 50/80.  Met    Long Term goals (8 weeks)  1. Patient to demonstrate symmetrical and pain-free knee AROM.  Met  2. Patient to demonstrate pain free and normal strength with MMTs.  Met  3. 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  In progress  4. Patient to demonstrate independence with home exercise program. In progress  5. Patient to improve LEFS to greater than or equal to 55/80. met      New Goals (8 weeks)  Short Term Goals (4 wks)  1. Patient will improve Quick Dash score to < 60 %.  2. Pt will demonstrate symmetrical, pain-free shoulder AROM.  3. Patient will be independent and compliant with initial home exercise program.     Long Term Goals (8 wks)  1. Patient to demonstrate normal and pain free UE strength with MMTs.  2. Patient will improve Quick Dash score to < 45 %.  3.  Patient to report awakening less than 3X/week due to left shoulder pain.  4. Pt. will be independent and compliant with advanced home exercise program to facilitate self-management of symptoms.  5.  Patient to  report completing all required work duties without functional impairment of left shoulder.      Plan  Therapy options: will be seen for skilled therapy services  Planned modality interventions: thermotherapy (hydrocollator packs), TENS, cryotherapy and dry needling  Planned therapy interventions: manual therapy, neuromuscular re-education, soft tissue mobilization, strengthening, stretching, therapeutic activities, joint mobilization, home exercise program, functional ROM exercises, balance/weight-bearing training, abdominal trunk stabilization, postural training, motor coordination training, spinal/joint mobilization, ADL retraining, transfer training, body mechanics training and gait training  Frequency: 2x week  Duration in weeks: 8  Treatment plan discussed with: patient          PT Signature: Alfonso Ricketts, PT  PT License [id number]    Based upon review of the patient's progress and continued therapy plan, it is my medical opinion that Guy Sheth should continue physical therapy treatment at [facility]  [address] [place] [id number] [phone].    Signature: __________________________________  Lee Noel MD    Timed:  Manual Therapy:    0     mins  78368;  Therapeutic Exercise:    21     mins  53223;     Neuromuscular Arturo:    10    mins  86243;    Therapeutic Activity:     10     mins  53222;     Gait Trainin     mins  13354;     Ultrasound:     0     mins  23847;    Self Care Trainin     mins  41188;    Untimed:  Electrical Stimulation:    0     mins  53197 (MC );  Mechanical Traction:    0     mins  33116;   Dry Needlin     mins   Re-evaluation                25      mins  16317;  Group Therapy              0      mins  77990;    Timed Treatment:   41   mins   Total Treatment:     66   mins

## 2025-01-16 ENCOUNTER — TREATMENT (OUTPATIENT)
Dept: PHYSICAL THERAPY | Facility: CLINIC | Age: 65
End: 2025-01-16
Payer: OTHER MISCELLANEOUS

## 2025-01-16 DIAGNOSIS — M25.562 ACUTE PAIN OF LEFT KNEE: ICD-10-CM

## 2025-01-16 DIAGNOSIS — G89.29 CHRONIC LEFT SHOULDER PAIN: Primary | ICD-10-CM

## 2025-01-16 DIAGNOSIS — M25.512 CHRONIC LEFT SHOULDER PAIN: Primary | ICD-10-CM

## 2025-01-16 NOTE — PROGRESS NOTES
"Physical Therapy Daily Progress Note    [facility]  [address]  [place] [address]      Patient: Guy Sheth   : 1960  Diagnosis/ICD-10 Code:  Chronic left shoulder pain [M25.512, G89.29]  Referring practitioner: Lee Noel MD  Date of Initial Visit: Type: THERAPY  Noted: 2024  Today's Date: 2025  Patient seen for 17 sessions           Subjective   Patient reports falling again 2 days ago at work when he was walking.  Patient states he slipped on ice and fell directly onto his right shoulder, feeling pain and a \"crunch\".  He reports his left shoulder seems to be recovering from his other fall which occurred last week.    Objective          Tenderness     Right Shoulder  Tenderness in the AC joint.     Active Range of Motion   Left Shoulder   Flexion: 150 degrees with pain  Abduction: 125 degrees with pain  External rotation BTH: T3 with pain  Internal rotation BTB: L5 with pain    Right Shoulder   Flexion: 115 degrees with pain  Abduction: 80 degrees with pain  External rotation BTH: T3 with pain  Internal rotation BTB: L5 with pain  Horizontal adduction: Right shoulder active horizontal adduction: touches L AC joint.     Joint Play     Comments  Right AC joint comments: provokes severe pain.       See Exercise, Manual, and Modality Logs for complete treatment.       Assessment/Plan  Updated bilateral shoulder ROM, with left shoulder returning to baseline measurements and right shoulder demonstrating decline.  Palpation of right AC joint reproduces his main complaint of right shoulder symptoms since his fall 2 days ago.  Patient advised to continue applying ice to his right shoulder and encouraged movement within pain-free range of motion.  Continued left shoulder rotator cuff retraining and static balance retraining for left knee.            Timed:  Manual Therapy:    0     mins  21952;  Therapeutic Exercise:    17     mins  50172;   "   Neuromuscular Arturo:   10    mins  05624;    Therapeutic Activity:     23     mins  31754;     Gait Trainin     mins  25043;     Ultrasound:     0     mins  92611;    Self Care Trainin     mins  95523;    Untimed:  Electrical Stimulation:    0     mins  12315 ( );  Mechanical Traction:    0     mins  97929;   Dry Needlin     mins   Re-evaluation                0      mins  08587;  Group Therapy              0      mins  07369;    Timed Treatment:   50   mins   Total Treatment:     50   mins    Alfonso Ricketts PT  Physical Therapist

## 2025-01-22 ENCOUNTER — OFFICE VISIT (OUTPATIENT)
Dept: CARDIOLOGY | Facility: CLINIC | Age: 65
End: 2025-01-22
Payer: COMMERCIAL

## 2025-01-22 VITALS
OXYGEN SATURATION: 96 % | WEIGHT: 207 LBS | HEART RATE: 63 BPM | HEIGHT: 71 IN | BODY MASS INDEX: 28.98 KG/M2 | DIASTOLIC BLOOD PRESSURE: 70 MMHG | SYSTOLIC BLOOD PRESSURE: 108 MMHG

## 2025-01-22 DIAGNOSIS — I65.22 CAROTID ARTERY CALCIFICATION, LEFT: ICD-10-CM

## 2025-01-22 DIAGNOSIS — E78.5 DYSLIPIDEMIA: ICD-10-CM

## 2025-01-22 DIAGNOSIS — I25.10 CORONARY ARTERY DISEASE INVOLVING NATIVE CORONARY ARTERY OF NATIVE HEART WITHOUT ANGINA PECTORIS: Primary | ICD-10-CM

## 2025-01-22 DIAGNOSIS — I10 ESSENTIAL HYPERTENSION: ICD-10-CM

## 2025-01-22 DIAGNOSIS — Z02.4 ENCOUNTER FOR COMMERCIAL DRIVING LICENSE (CDL) EXAM: ICD-10-CM

## 2025-01-22 RX ORDER — GLIPIZIDE 5 MG/1
5 TABLET, FILM COATED, EXTENDED RELEASE ORAL
COMMUNITY
Start: 2024-12-16

## 2025-01-22 NOTE — PROGRESS NOTES
"    Subjective:     Encounter Date:01/22/2025      Patient ID: Guy Sheth is a 64 y.o. male.    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  Since his last visit he has had no active cardiovascular issues, symptoms or hospitalizations.  He remains a non-smoker.  The patient indicates that he has had some labile blood pressure recordings.  He indicates when he is driving \"on the road\" if he becomes angry or upset he will notice \"spikes\" in his blood pressure.  He indicates that he will have to use a clonidine during those episodes which is effective.  He indicates when he is not a more relaxed setting particularly at home his blood pressures have been somewhat low on occasion running around 80/50.  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  However, the patient indicates that recently had a fall resulting in a left knee injury.  He is currently undergoing physical therapy for that process.  Subsequent to his first fall he had an additional second fall which resulted in a right hip injury.  He does not feel that he would be able to do treadmill stress testing due to these injuries.  The following portions of the patient's history were reviewed and updated as appropriate: allergies, current medications, past family history, past medical history, past social history, past surgical history and problem  Review of Systems   Constitutional: Negative for chills, diaphoresis, fever, malaise/fatigue, weight gain and weight loss.   HENT:  Negative for ear discharge, hearing loss, hoarse voice and nosebleeds.    Eyes:  Negative for discharge, double vision, pain and photophobia.   Cardiovascular:  Negative for chest pain, " claudication, cyanosis, dyspnea on exertion, irregular heartbeat, leg swelling, near-syncope, orthopnea, palpitations, paroxysmal nocturnal dyspnea and syncope.   Respiratory:  Negative for cough, hemoptysis, sputum production and wheezing.    Endocrine: Negative for cold intolerance, heat intolerance, polydipsia, polyphagia and polyuria.   Hematologic/Lymphatic: Negative for adenopathy and bleeding problem. Does not bruise/bleed easily.   Skin:  Negative for color change, flushing, itching and rash.   Musculoskeletal:  Negative for muscle cramps, muscle weakness, myalgias and stiffness.   Gastrointestinal:  Negative for abdominal pain, diarrhea, hematemesis, hematochezia, nausea and vomiting.   Genitourinary:  Negative for dysuria, frequency and nocturia.   Neurological:  Negative for focal weakness, loss of balance, numbness, paresthesias and seizures.   Psychiatric/Behavioral:  Negative for altered mental status, hallucinations and suicidal ideas.    Allergic/Immunologic: Negative for HIV exposure, hives and persistent infections.           Current Outpatient Medications:     aspirin  MG tablet, Take 1 tablet by mouth Daily., Disp: , Rfl:     bisoprolol (ZEBeta) 10 MG tablet, Take 1 tablet by mouth Daily., Disp: 90 tablet, Rfl: 3    cetirizine (zyrTEC) 10 MG tablet, Take 1 tablet by mouth Daily., Disp: , Rfl:     cloNIDine (Catapres) 0.1 MG tablet, Take 1 tablet by mouth Every 8 (Eight) Hours As Needed for High Blood Pressure. SBP>150 MM/HG & OR DBP> 95 MM/HG, Disp: 90 tablet, Rfl: 3    Crestor 20 MG tablet, Take 1 tablet by mouth Daily., Disp: 30 tablet, Rfl: 4    DULoxetine (CYMBALTA) 60 MG capsule, Take 1 capsule by mouth Daily., Disp: , Rfl:     famotidine (PEPCID) 20 MG tablet, , Disp: , Rfl:     Farxiga 10 MG tablet, , Disp: , Rfl:     fexofenadine (ALLEGRA) 180 MG tablet, Take 1 tablet by mouth Daily As Needed., Disp: , Rfl:     glipizide (GLUCOTROL XL) 5 MG ER tablet, 1 tablet., Disp: , Rfl:      "Kerendia 10 MG tablet, Take 1 tablet by mouth Daily., Disp: , Rfl:     linaclotide (LINZESS) 290 MCG capsule capsule, Every Morning, Disp: , Rfl:     metaxalone (SKELAXIN) 800 MG tablet, TAKE 1 TABLET BY MOUTH THREE TIMES DAILY AS NEEDED FOR MUSCLE PAIN, Disp: , Rfl:     metFORMIN (GLUCOPHAGE) 500 MG tablet, 1 tablet., Disp: , Rfl:     Olmesartan-amLODIPine-HCTZ 20-5-12.5 MG tablet, Take 1 tablet by mouth 2 (Two) Times a Day., Disp: 180 tablet, Rfl: 3    pantoprazole (PROTONIX) 40 MG EC tablet, Take 1 tablet by mouth Daily., Disp: , Rfl:     tamsulosin (FLOMAX) 0.4 MG capsule 24 hr capsule, , Disp: , Rfl:     temazepam (RESTORIL) 15 MG capsule, Take 1 capsule by mouth 2 (Two) Times a Day., Disp: , Rfl:     traZODone (DESYREL) 150 MG tablet, Take 1 tablet by mouth At Night As Needed for Sleep., Disp: , Rfl:     Objective:   Vitals and nursing note reviewed.   Constitutional:       Appearance: Healthy appearance. Not in distress.   Neck:      Vascular: No JVR. JVD normal.   Pulmonary:      Effort: Pulmonary effort is normal.      Breath sounds: Normal breath sounds. No wheezing. No rhonchi. No rales.   Chest:      Chest wall: Not tender to palpatation.   Cardiovascular:      PMI at left midclavicular line. Normal rate. Regular rhythm. Normal S1. Normal S2.       Murmurs: There is no murmur.      No gallop.  No click. No rub.   Pulses:     Intact distal pulses.   Edema:     Peripheral edema absent.   Abdominal:      General: Bowel sounds are normal.      Palpations: Abdomen is soft.      Tenderness: There is no abdominal tenderness.   Musculoskeletal: Normal range of motion.         General: No tenderness. Skin:     General: Skin is warm and dry.   Neurological:      General: No focal deficit present.      Mental Status: Alert and oriented to person, place and time.       Blood pressure 108/70, pulse 63, height 180.3 cm (71\"), weight 93.9 kg (207 lb), SpO2 96%.   Lab Review:     Assessment:       1. Carotid artery " "calcification, left  Asymptomatic.    2. Coronary artery disease involving native coronary artery of native heart without angina pectoris    - Stress Test With Myocardial Perfusion Two Day    3. Dyslipidemia  Tolerating high intensity statin based cholesterol-lowering therapy without side effects.    4. Essential hypertension  Acceptable blood pressure control.  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    5. 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  He is unable to proceed with treadmill exercise stress testing due to 2 recent falls resulting in left knee and right hip injuries.    Procedures    Plan:     Advance Care Planning   ACP discussion was held with the patient during this visit. Patient has an advance directive (not in EMR), copy requested.     I have recommended a vasodilator nuclear stress test utilizing a 2-day imaging protocol in order to help minimize attenuation artifact which is anticipated given his body habitus.    No changes in medications made at today's visit.    Further recommendations predicated on the results of his outpatient testing.      "

## 2025-01-23 ENCOUNTER — TREATMENT (OUTPATIENT)
Dept: PHYSICAL THERAPY | Facility: CLINIC | Age: 65
End: 2025-01-23
Payer: OTHER MISCELLANEOUS

## 2025-01-23 DIAGNOSIS — M25.512 CHRONIC LEFT SHOULDER PAIN: Primary | ICD-10-CM

## 2025-01-23 DIAGNOSIS — M25.562 ACUTE PAIN OF LEFT KNEE: ICD-10-CM

## 2025-01-23 DIAGNOSIS — G89.29 CHRONIC LEFT SHOULDER PAIN: Primary | ICD-10-CM

## 2025-01-23 NOTE — PROGRESS NOTES
Physical Therapy Daily Progress Note    [facility]  [address]  [place] [address]      Patient: Guy Sheth   : 1960  Diagnosis/ICD-10 Code:  Chronic left shoulder pain [M25.512, G89.29]  Referring practitioner: Lee Noel MD  Date of Initial Visit: Type: THERAPY  Noted: 2024  Today's Date: 2025  Patient seen for 18 sessions           Subjective   Patient reports L shoulder feels like it did prior to his most recent 2 falls. R shoulder continues to hurt and R hip pain has started to radiate into his R lower leg with prolonged standing/walking.    Objective   See Exercise, Manual, and Modality Logs for complete treatment.       Assessment/Plan  Lower level activities completed to accommodate new R low back and R LE symptoms. Continued emphasis on gaining shoulder ROM and building L shoulder stability, after which he reported fatigue.            Timed:  Manual Therapy:    0     mins  70254;  Therapeutic Exercise:    30     mins  26067;     Neuromuscular Arturo:   10    mins  04101;    Therapeutic Activity:     20     mins  85060;     Gait Trainin     mins  93632;     Ultrasound:     0     mins  14278;    Self Care Trainin     mins  64393;    Untimed:  Electrical Stimulation:    0     mins  42728 ( );  Mechanical Traction:    0     mins  60599;   Dry Needlin     mins   Re-evaluation                0      mins  36642;  Group Therapy              0      mins  83698;    Timed Treatment:   60   mins   Total Treatment:     60   mins    Alfonso Ricketts PT  Physical Therapist

## 2025-01-27 ENCOUNTER — TREATMENT (OUTPATIENT)
Dept: PHYSICAL THERAPY | Facility: CLINIC | Age: 65
End: 2025-01-27
Payer: OTHER MISCELLANEOUS

## 2025-01-27 DIAGNOSIS — M25.562 ACUTE PAIN OF LEFT KNEE: ICD-10-CM

## 2025-01-27 DIAGNOSIS — G89.29 CHRONIC LEFT SHOULDER PAIN: Primary | ICD-10-CM

## 2025-01-27 DIAGNOSIS — M25.512 CHRONIC LEFT SHOULDER PAIN: Primary | ICD-10-CM

## 2025-01-27 PROCEDURE — 97110 THERAPEUTIC EXERCISES: CPT | Performed by: PHYSICAL THERAPIST

## 2025-01-27 PROCEDURE — 97112 NEUROMUSCULAR REEDUCATION: CPT | Performed by: PHYSICAL THERAPIST

## 2025-01-27 PROCEDURE — 97530 THERAPEUTIC ACTIVITIES: CPT | Performed by: PHYSICAL THERAPIST

## 2025-01-27 NOTE — PROGRESS NOTES
Physical Therapy Daily Progress Note    [facility]  [address]  [place] [address]      Patient: Guy Sheht   : 1960  Diagnosis/ICD-10 Code:  Chronic left shoulder pain [M25.512, G89.29]  Referring practitioner: Lee Noel MD  Date of Initial Visit: Type: THERAPY  Noted: 2024  Today's Date: 2025  Patient seen for 19 sessions           Subjective   Patient reports R hip is his most aggravating/painful area, radiating into his R lower leg with standing and walking. Sitting provides relief.     Objective          Passive Range of Motion   Left Shoulder   Flexion: 140 (pain limited) degrees with pain  Abduction: 150 degrees with pain  External rotation 90°: 90 degrees   Internal rotation 90°: 15 (pain limited) degrees with pain      See Exercise, Manual, and Modality Logs for complete treatment.       Assessment/Plan  Progressed shoulder stability interventions with added resistance. He fatigued with added difficulty but reported minimal increase in shoulder pain. He was mostly limited by low back/R hip pain, which led to exercise modifications to minimize symptom aggravation.            Timed:  Manual Therapy:    0     mins  68090;  Therapeutic Exercise:    30     mins  76333;     Neuromuscular Arturo:   15    mins  10393;    Therapeutic Activity:     8     mins  60085;     Gait Trainin     mins  06229;     Ultrasound:     0     mins  73400;    Self Care Trainin     mins  52217;    Untimed:  Electrical Stimulation:    0     mins  57769 ( );  Mechanical Traction:    0     mins  18171;   Dry Needlin     mins   Re-evaluation                0      mins  85341;  Group Therapy              0      mins  58972;    Timed Treatment:   53   mins   Total Treatment:     53   mins    Alfonso Ricketts PT  Physical Therapist

## 2025-01-29 ENCOUNTER — OFFICE VISIT (OUTPATIENT)
Age: 65
End: 2025-01-29
Payer: OTHER MISCELLANEOUS

## 2025-01-29 VITALS
BODY MASS INDEX: 28.98 KG/M2 | WEIGHT: 207.01 LBS | SYSTOLIC BLOOD PRESSURE: 140 MMHG | HEIGHT: 71 IN | DIASTOLIC BLOOD PRESSURE: 88 MMHG
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
## 2025-01-29 DIAGNOSIS — M70.61 TROCHANTERIC BURSITIS OF RIGHT HIP: ICD-10-CM

## 2025-01-29 DIAGNOSIS — M75.81 TENDINITIS OF RIGHT ROTATOR CUFF: ICD-10-CM

## 2025-01-29 DIAGNOSIS — M25.551 RIGHT HIP PAIN: Primary | ICD-10-CM

## 2025-01-29 DIAGNOSIS — Z02.6 ENCOUNTER RELATED TO WORKER'S COMPENSATION CLAIM: ICD-10-CM

## 2025-01-29 DIAGNOSIS — M25.511 RIGHT SHOULDER PAIN, UNSPECIFIED CHRONICITY: ICD-10-CM

## 2025-01-29 RX ORDER — CYCLOBENZAPRINE HCL 10 MG
10 TABLET ORAL NIGHTLY PRN
Qty: 30 TABLET | Refills: 0 | Status: SHIPPED | OUTPATIENT
Start: 2025-01-29

## 2025-01-29 RX ORDER — LIDOCAINE HYDROCHLORIDE 10 MG/ML
4 INJECTION, SOLUTION EPIDURAL; INFILTRATION; INTRACAUDAL; PERINEURAL
Status: COMPLETED | OUTPATIENT
Start: 2025-01-29 | End: 2025-01-29

## 2025-01-29 RX ORDER — BUPIVACAINE HYDROCHLORIDE 5 MG/ML
4 INJECTION, SOLUTION EPIDURAL; INTRACAUDAL
Status: COMPLETED | OUTPATIENT
Start: 2025-01-29 | End: 2025-01-29

## 2025-01-29 RX ORDER — TRIAMCINOLONE ACETONIDE 40 MG/ML
80 INJECTION, SUSPENSION INTRA-ARTICULAR; INTRAMUSCULAR
Status: COMPLETED | OUTPATIENT
Start: 2025-01-29 | End: 2025-01-29

## 2025-01-29 RX ADMIN — LIDOCAINE HYDROCHLORIDE 4 ML: 10 INJECTION, SOLUTION EPIDURAL; INFILTRATION; INTRACAUDAL; PERINEURAL at 09:37

## 2025-01-29 RX ADMIN — BUPIVACAINE HYDROCHLORIDE 4 ML: 5 INJECTION, SOLUTION EPIDURAL; INTRACAUDAL at 09:37

## 2025-01-29 RX ADMIN — TRIAMCINOLONE ACETONIDE 80 MG: 40 INJECTION, SUSPENSION INTRA-ARTICULAR; INTRAMUSCULAR at 09:37
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
## 2025-01-29 NOTE — PROGRESS NOTES
Procedure   - Large Joint Arthrocentesis: R greater trochanteric bursa on 1/29/2025 9:37 AM  Indications: pain  Details: lateral approach  Medications: 4 mL lidocaine PF 1% 1 %; 4 mL bupivacaine (PF) 0.5 %; 80 mg triamcinolone acetonide 40 MG/ML  Outcome: tolerated well, no immediate complications  Procedure, treatment alternatives, risks and benefits explained, specific risks discussed. Consent was given by the patient. Immediately prior to procedure a time out was called to verify the correct patient, procedure, equipment, support staff and site/side marked as required. Patient was prepped and draped in the usual sterile fashion.

## 2025-01-30 ENCOUNTER — TREATMENT (OUTPATIENT)
Dept: PHYSICAL THERAPY | Facility: CLINIC | Age: 65
End: 2025-01-30
Payer: OTHER MISCELLANEOUS

## 2025-01-30 DIAGNOSIS — M25.511 ACUTE PAIN OF RIGHT SHOULDER: ICD-10-CM

## 2025-01-30 DIAGNOSIS — M25.562 ACUTE PAIN OF LEFT KNEE: ICD-10-CM

## 2025-01-30 DIAGNOSIS — M25.512 CHRONIC LEFT SHOULDER PAIN: Primary | ICD-10-CM

## 2025-01-30 DIAGNOSIS — M25.551 PAIN OF RIGHT HIP: ICD-10-CM

## 2025-01-30 DIAGNOSIS — G89.29 CHRONIC LEFT SHOULDER PAIN: Primary | ICD-10-CM

## 2025-02-03 ENCOUNTER — TELEPHONE (OUTPATIENT)
Dept: CARDIOLOGY | Facility: CLINIC | Age: 65
End: 2025-02-03
Payer: COMMERCIAL

## 2025-02-03 NOTE — TELEPHONE ENCOUNTER
I spoke with pt and he wants to know if he can do the 1 day alisson instead of the 2. He states with the fear of needles and his work schedule that the 1 day would be better for him if possible.

## 2025-02-03 NOTE — TELEPHONE ENCOUNTER
I spoke with the patient about the 2 day stress that was ordered for him, he states that he has a fear of needles and wants to know if he can do the treadmill instead like he did before? Please advise.

## 2025-02-03 NOTE — TELEPHONE ENCOUNTER
Epic secure chat started with provider.   Order placed, spoke with pt and told him to expect a call from Central Scheduling to set up the appointment.

## 2025-02-06 ENCOUNTER — TREATMENT (OUTPATIENT)
Dept: PHYSICAL THERAPY | Facility: CLINIC | Age: 65
End: 2025-02-06
Payer: OTHER MISCELLANEOUS

## 2025-02-06 DIAGNOSIS — M25.512 CHRONIC LEFT SHOULDER PAIN: Primary | ICD-10-CM

## 2025-02-06 DIAGNOSIS — G89.29 CHRONIC LEFT SHOULDER PAIN: Primary | ICD-10-CM

## 2025-02-06 DIAGNOSIS — M25.511 ACUTE PAIN OF RIGHT SHOULDER: ICD-10-CM

## 2025-02-06 DIAGNOSIS — M25.562 ACUTE PAIN OF LEFT KNEE: ICD-10-CM

## 2025-02-06 DIAGNOSIS — M25.551 PAIN OF RIGHT HIP: ICD-10-CM

## 2025-02-06 NOTE — PROGRESS NOTES
Physical Therapy Daily Progress Note    [facility]  [address]  [place] [address]      Patient: Guy Sheth   : 1960  Diagnosis/ICD-10 Code:  Chronic left shoulder pain [M25.512, G89.29]  Referring practitioner: Lee Noel MD  Date of Initial Visit: Type: THERAPY  Noted: 2024  Today's Date: 2025  Patient seen for 21 sessions           Subjective   Patient reports increased R hip and LE pain until yesterday and today, as it feels relatively good now. B shoulders were aggravated yesterday opening and closing containers.    Objective          Neurological Testing     Additional Neurological Details  (+) slump on R for symptom aggravation and reduction depending on ankle DF/PF      See Exercise, Manual, and Modality Logs for complete treatment.       Assessment/Plan  Introduced lower quarter exercises to address right hip and lower extremity symptoms.  Activities addressed neural mobility, lumbar mobility, and hip/gluteal retraining.  Right hip poorly tolerated full weightbearing, with residual increase in peripheral symptoms.  Sitting seems to consistently alleviate or reduce lower extremity symptoms.            Timed:  Manual Therapy:    0     mins  83135;  Therapeutic Exercise:    20     mins  57935;     Neuromuscular Arturo:   30    mins  34876;    Therapeutic Activity:     10     mins  91144;     Gait Trainin     mins  38910;     Ultrasound:     0     mins  73549;    Self Care Trainin     mins  40224;    Untimed:  Electrical Stimulation:    0     mins  64547 ( );  Mechanical Traction:    0     mins  50836;   Dry Needlin     mins   Re-evaluation                0      mins  76202;  Group Therapy              0      mins  23223;    Timed Treatment:   53   mins   Total Treatment:     53   mins    Alfonso Ricketts PT  Physical Therapist

## 2025-02-10 ENCOUNTER — TREATMENT (OUTPATIENT)
Dept: PHYSICAL THERAPY | Facility: CLINIC | Age: 65
End: 2025-02-10
Payer: OTHER MISCELLANEOUS

## 2025-02-10 DIAGNOSIS — M25.551 PAIN OF RIGHT HIP: ICD-10-CM

## 2025-02-10 DIAGNOSIS — M25.512 CHRONIC LEFT SHOULDER PAIN: Primary | ICD-10-CM

## 2025-02-10 DIAGNOSIS — G89.29 CHRONIC LEFT SHOULDER PAIN: Primary | ICD-10-CM

## 2025-02-10 DIAGNOSIS — M25.562 ACUTE PAIN OF LEFT KNEE: ICD-10-CM

## 2025-02-10 DIAGNOSIS — M25.511 ACUTE PAIN OF RIGHT SHOULDER: ICD-10-CM

## 2025-02-10 PROCEDURE — 97110 THERAPEUTIC EXERCISES: CPT | Performed by: PHYSICAL THERAPIST

## 2025-02-10 PROCEDURE — 97112 NEUROMUSCULAR REEDUCATION: CPT | Performed by: PHYSICAL THERAPIST

## 2025-02-10 PROCEDURE — 97530 THERAPEUTIC ACTIVITIES: CPT | Performed by: PHYSICAL THERAPIST

## 2025-02-10 NOTE — PROGRESS NOTES
Physical Therapy Daily Progress Note    [facility]  [address]  [place] [address]      Patient: Guy Sheth   : 1960  Diagnosis/ICD-10 Code:  Chronic left shoulder pain [M25.512, G89.29]  Referring practitioner: Lee Noel MD  Date of Initial Visit: Type: THERAPY  Noted: 2024  Today's Date: 2/10/2025  Patient seen for 22 sessions           Subjective   Patient reports L shoulder to continues to remain recently unchanged. R shoulder is gradually improving and L knee has not recently bothered him relatively to everything else. R LE symptoms continue to remain severe and radiate distal to the R knee. Weight bearing through the R LE consistently increases pain but it often hurts at rest, especially at night.    Objective   See Exercise, Manual, and Modality Logs for complete treatment.       Assessment/Plan  Patient demonstrated decreased tolerance for right hip activities compared to previous visits.  Able to progress bilateral shoulder activities with fatigue but no increased pain.    Patient is referring physician was contacted due to patient concerns about severe right lower extremity symptoms that limit his function and his ability to sleep.        Timed:  Manual Therapy:    0     mins  46973;  Therapeutic Exercise:    15     mins  88216;     Neuromuscular Arturo:   25    mins  36326;    Therapeutic Activity:     15     mins  50921;     Gait Trainin     mins  10948;     Ultrasound:     0     mins  70881;    Self Care Trainin     mins  28141;    Untimed:  Electrical Stimulation:    0     mins  35479 ( );  Mechanical Traction:    0     mins  15792;   Dry Needlin     mins   Re-evaluation                0      mins  33441;  Group Therapy              0      mins  74516;    Timed Treatment:   55   mins   Total Treatment:     55   mins    Alfonso Ricketts PT  Physical Therapist

## 2025-02-11 ENCOUNTER — TELEPHONE (OUTPATIENT)
Age: 65
End: 2025-02-11
Payer: COMMERCIAL

## 2025-02-11 NOTE — TELEPHONE ENCOUNTER
Caller: KATTY ALVARES     Relationship to patient: PATIENT     Best call back number: [phone]    Chief complaint: RIGHT HIP PAIN     Type of visit: FOLLOW UP     Requested date: 02/13/2025 BEFORE 10:30 AM      If rescheduling, when is the original appointment: N/A     Additional notes:PATIENT STATES HE RECEIVED INJECTION IN RIGHT HIP  01/29/2025 AND HE IS STILL EXPERIENCING A HIGH LEVEL OF PAIN - STATES IT'S LIKE A TOOTHACHE.   PATIENT HAS TO BE AT THE [place] LOCATION FOR ANOTHER APPOINTMENT 02/13/2025 FOR ANOTHER APPOINTMENT AT 10:30 AND HE WOULD LIKE TO SEE DR YBARRA PRIOR TO THIS APPOINTMENT. PLEASE CALL PATIENT TO DISCUSS SCHEDULING

## 2025-02-13 ENCOUNTER — TREATMENT (OUTPATIENT)
Dept: PHYSICAL THERAPY | Facility: CLINIC | Age: 65
End: 2025-02-13
Payer: OTHER MISCELLANEOUS

## 2025-02-13 DIAGNOSIS — M25.512 CHRONIC LEFT SHOULDER PAIN: Primary | ICD-10-CM

## 2025-02-13 DIAGNOSIS — M25.511 ACUTE PAIN OF RIGHT SHOULDER: ICD-10-CM

## 2025-02-13 DIAGNOSIS — M25.551 PAIN OF RIGHT HIP: ICD-10-CM

## 2025-02-13 DIAGNOSIS — M25.562 ACUTE PAIN OF LEFT KNEE: ICD-10-CM

## 2025-02-13 DIAGNOSIS — G89.29 CHRONIC LEFT SHOULDER PAIN: Primary | ICD-10-CM

## 2025-02-13 NOTE — PROGRESS NOTES
"Physical Therapy Daily Progress Note    [facility]  [address]  [place] [address]      Patient: Guy Sheth   : 1960  Diagnosis/ICD-10 Code:  No primary diagnosis found.  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    Objective          Tenderness     Left Shoulder   Tenderness in the biceps tendon (proximal) and bicipital groove.     Active Range of Motion     Right Knee   Flexion: WFL  Extension: WFL    Passive Range of Motion     Right Knee   Flexion: Right knee passive flexion: feels tight. WFL  Extension: WFL    Patellar Mobility     Right Knee Patellar tendons within functional limits include the medial, lateral, superior and inferior.     Strength/Myotome Testing     Left Shoulder     Isolated Muscles   Biceps: 4+ (pain)     Tests     Right Hip   Positive FADIR (large R hip joint cavitation with initial test).   Negative scour.       See Exercise, Manual, and Modality Logs for complete treatment.       Assessment/Plan  Additional screening of left shoulder and right lower extremity completed today.  Left proximal biceps tendon was TTP and resisted elbow flexion was painful, so eccentric loading of biceps was trialed though with poor response after.      With patient reporting \"tooth ache pain\" at his right knee, additional right knee joint screening was completed.  No mechanical findings were noted with testing of right knee and no symptoms were reproduced.  Testing of left right hip joint revealed significant IR ROM limitation, with large cavitation noted at 0 degrees IR with initial testing.  He is reporting right groin symptoms in addition to lateral and posterior hip and distal to the knee.  " He consistently reports moving into lumbar extension, either in seated position or standing/walking, provokes his right hip pain and lead to increased peripheral symptoms.            Timed:  Manual Therapy:    0     mins  74524;  Therapeutic Exercise:    20     mins  52884;     Neuromuscular rAturo:   15    mins  84471;    Therapeutic Activity:     25     mins  38234;     Gait Trainin     mins  21499;     Ultrasound:     0     mins  14924;    Self Care Trainin     mins  34381;    Untimed:  Electrical Stimulation:    0     mins  40475 ( );  Mechanical Traction:    0     mins  90460;   Dry Needlin     mins   Re-evaluation                0      mins  64761;  Group Therapy              0      mins  41899;    Timed Treatment:   60   mins   Total Treatment:     60   mins    Alfonso Ricketts PT  Physical Therapist

## 2025-02-17 ENCOUNTER — TREATMENT (OUTPATIENT)
Dept: PHYSICAL THERAPY | Facility: CLINIC | Age: 65
End: 2025-02-17
Payer: OTHER MISCELLANEOUS

## 2025-02-17 DIAGNOSIS — G89.29 CHRONIC LEFT SHOULDER PAIN: Primary | ICD-10-CM

## 2025-02-17 DIAGNOSIS — M25.562 ACUTE PAIN OF LEFT KNEE: ICD-10-CM

## 2025-02-17 DIAGNOSIS — M25.551 PAIN OF RIGHT HIP: ICD-10-CM

## 2025-02-17 DIAGNOSIS — M25.511 ACUTE PAIN OF RIGHT SHOULDER: ICD-10-CM

## 2025-02-17 DIAGNOSIS — M25.512 CHRONIC LEFT SHOULDER PAIN: Primary | ICD-10-CM

## 2025-02-17 PROCEDURE — 97530 THERAPEUTIC ACTIVITIES: CPT | Performed by: PHYSICAL THERAPIST

## 2025-02-17 PROCEDURE — 97110 THERAPEUTIC EXERCISES: CPT | Performed by: PHYSICAL THERAPIST

## 2025-02-17 NOTE — PROGRESS NOTES
Physical Therapy Daily Progress Note    [facility]  [address]  [place] [address]      Patient: Guy Sheth   : 1960  Diagnosis/ICD-10 Code:  Chronic left shoulder pain [M25.512, G89.29]  Referring practitioner: Lee Noel MD  Date of Initial Visit: Type: THERAPY  Noted: 2024  Today's Date: 2025  Patient seen for 24 sessions           Subjective   Patient reports no change in R LE and R hip/low back symptoms. They continue to hurt worse with prolonged walking and when sitting and extending his lumbar spine.    Objective          Tenderness     Left Shoulder   Tenderness in the biceps tendon (proximal) and bicipital groove.     Right Shoulder  Tenderness in the AC joint.     Right Hip   Tenderness in the greater trochanter.     Neurological Testing     Additional Neurological Details  (-) for symptom reproduction but neural tension present    Active Range of Motion   Left Shoulder   Flexion: 150 degrees with pain  Abduction: 100 degrees with pain  External rotation BTH: T3 with pain  Internal rotation BTB: L5 with pain    Right Shoulder   Flexion: 140 degrees with pain  Abduction: 135 degrees with pain  External rotation BTH: T3 with pain  Internal rotation BTB: L5 with pain  Horizontal adduction: Right shoulder active horizontal adduction: touches L scapular spine. with pain  Left Knee   Flexion: WFL and with pain  Extension: WFL    Right Knee   Flexion: WFL  Extension: WFL    Additional Active Range of Motion Details  Flexion: touches distal to knees, significant relief of symptoms  Extension: neutral, peripheralization of R LE symptoms    Passive Range of Motion   Left Hip   Flexion: Left hip passive flexion: 110 deg, R hip/low back pain.   External rotation (90/90): WFL  Internal rotation (90/90): WFL    Right Hip   Flexion: 100 (120 deg, R groin pain) degrees   Extension: 0 (significant R low back pain radiating to distal of R knee) degrees    External rotation (90/90): 40 (R hip pain) degrees   Internal rotation (90/90): 10 (R hip pain) degrees   Left Knee   Flexion: WFL and with pain  Extension: WFL    Right Knee   Flexion: WFL  Extension: WFL    Strength/Myotome Testing     Left Shoulder     Planes of Motion   Extension: 5   Abduction: 4- (pain)   External rotation at 0°: 5   Internal rotation at 0°: 4- (pain)     Isolated Muscles   Biceps: 4+ (pain)     Right Shoulder     Planes of Motion   Extension: 5   Abduction: 5   External rotation at 0°: 5   Internal rotation at 0°: 4- (pain)     Right Hip   Planes of Motion   Flexion: 4-  Right hip abductors strength: unable to test due to pain attempting to hold up leg.  External rotation: 4+    Left Knee   Flexion: 5  Extension: 5 (slight knee pain)  Quadriceps contraction: good    Tests     Right Hip   Negative FADIR and scour.       See Exercise, Manual, and Modality Logs for complete treatment.       Assessment/Plan  Patient demonstrating R shoulder progress, but L shoulder and R hip/low back/LE symptoms and function have not recently changed.      Left shoulder AROM continues to be limited, especially into abduction and IR.  Resisted elbow flexion, shoulder ABD, and shoulder IR are his weakest and most painful directions of movement.    Right sided hip, low back, and lateral leg down to the shin symptoms are aggravated by prolonged standing/walking and any extension of lumbar spine, while these symptoms are significantly relieved with forward lumbar flexion.  Right hip joint signs are intermittently present, but mostly provoke right groin symptoms instead of posterior/lateral hip and peripheral LE symptoms.    Patient follows up with Ortho on .      Timed:  Manual Therapy:    0     mins  46792;  Therapeutic Exercise:    10     mins  24412;     Neuromuscular Arturo:   0    mins  92421;    Therapeutic Activity:     30     mins  08604;     Gait Trainin     mins  71110;     Ultrasound:     0      mins  38148;    Self Care Trainin     mins  41391;    Untimed:  Electrical Stimulation:    0     mins  63271 ( );  Mechanical Traction:    0     mins  91312;   Dry Needlin     mins   Re-evaluation                0      mins  73776;  Group Therapy              0      mins  87070;    Timed Treatment:   40   mins   Total Treatment:     40   mins    Alfonso Ricketts PT  Physical Therapist

## 2025-02-20 ENCOUNTER — TREATMENT (OUTPATIENT)
Dept: PHYSICAL THERAPY | Facility: CLINIC | Age: 65
End: 2025-02-20
Payer: OTHER MISCELLANEOUS

## 2025-02-20 ENCOUNTER — OFFICE VISIT (OUTPATIENT)
Age: 65
End: 2025-02-20
Payer: OTHER MISCELLANEOUS

## 2025-02-20 VITALS
SYSTOLIC BLOOD PRESSURE: 102 MMHG | WEIGHT: 207.01 LBS | HEIGHT: 71 IN | BODY MASS INDEX: 28.98 KG/M2 | DIASTOLIC BLOOD PRESSURE: 70 MMHG

## 2025-02-20 DIAGNOSIS — M70.61 TROCHANTERIC BURSITIS OF RIGHT HIP: ICD-10-CM

## 2025-02-20 DIAGNOSIS — M25.551 PAIN OF RIGHT HIP: ICD-10-CM

## 2025-02-20 DIAGNOSIS — G89.29 CHRONIC LEFT SHOULDER PAIN: Primary | ICD-10-CM

## 2025-02-20 DIAGNOSIS — M16.11 PRIMARY OSTEOARTHRITIS OF RIGHT HIP: ICD-10-CM

## 2025-02-20 DIAGNOSIS — M25.562 ACUTE PAIN OF LEFT KNEE: ICD-10-CM

## 2025-02-20 DIAGNOSIS — Z02.6 ENCOUNTER RELATED TO WORKER'S COMPENSATION CLAIM: ICD-10-CM

## 2025-02-20 DIAGNOSIS — M25.512 CHRONIC LEFT SHOULDER PAIN: Primary | ICD-10-CM

## 2025-02-20 DIAGNOSIS — M24.9 DERANGEMENT OF RIGHT SI JOINT: ICD-10-CM

## 2025-02-20 DIAGNOSIS — M25.551 RIGHT HIP PAIN: Primary | ICD-10-CM

## 2025-02-20 DIAGNOSIS — M25.511 ACUTE PAIN OF RIGHT SHOULDER: ICD-10-CM

## 2025-02-20 RX ORDER — LIDOCAINE HYDROCHLORIDE 10 MG/ML
2 INJECTION, SOLUTION EPIDURAL; INFILTRATION; INTRACAUDAL; PERINEURAL
Status: COMPLETED | OUTPATIENT
Start: 2025-02-20 | End: 2025-02-20

## 2025-02-20 RX ORDER — TRIAMCINOLONE ACETONIDE 40 MG/ML
80 INJECTION, SUSPENSION INTRA-ARTICULAR; INTRAMUSCULAR
Status: COMPLETED | OUTPATIENT
Start: 2025-02-20 | End: 2025-02-20

## 2025-02-20 RX ORDER — BUPIVACAINE HYDROCHLORIDE 5 MG/ML
2 INJECTION, SOLUTION EPIDURAL; INTRACAUDAL
Status: COMPLETED | OUTPATIENT
Start: 2025-02-20 | End: 2025-02-20

## 2025-02-20 RX ADMIN — TRIAMCINOLONE ACETONIDE 80 MG: 40 INJECTION, SUSPENSION INTRA-ARTICULAR; INTRAMUSCULAR at 09:00

## 2025-02-20 RX ADMIN — LIDOCAINE HYDROCHLORIDE 2 ML: 10 INJECTION, SOLUTION EPIDURAL; INFILTRATION; INTRACAUDAL; PERINEURAL at 09:00

## 2025-02-20 RX ADMIN — BUPIVACAINE HYDROCHLORIDE 2 ML: 5 INJECTION, SOLUTION EPIDURAL; INTRACAUDAL at 09:00

## 2025-02-20 NOTE — PROGRESS NOTES
[facility] Follow Up Visit     Office Follow Up      Date: 02/20/2025   Patient Name: Guy Sheth  MRN: [id number]  YOB: 1960    Referring Physician: No ref. provider found     Chief Complaint:   Chief Complaint   Patient presents with    Follow-up     3 week follow up -- Right hip pain       History of Present Illness: Guy Sheth is a 64 y.o. male who returns to clinic today for follow up on right hip pain. His pain is a 8 /10 on the pain scale. Patient has tried the following previous treatments physical therapy  and injections: Cortisone/Visco. He mentions current symptoms of pain , giving Way , and numbness / tingling. He states that these treatments have worsened.      Subjective     Review of Systems: Review of Systems   Musculoskeletal:  Positive for arthralgias.        Medications:   Current Outpatient Medications:     aspirin  MG tablet, Take 1 tablet by mouth Daily., Disp: , Rfl:     bisoprolol (ZEBeta) 10 MG tablet, Take 1 tablet by mouth Daily., Disp: 90 tablet, Rfl: 3    cetirizine (zyrTEC) 10 MG tablet, Take 1 tablet by mouth Daily., Disp: , Rfl:     cloNIDine (Catapres) 0.1 MG tablet, Take 1 tablet by mouth Every 8 (Eight) Hours As Needed for High Blood Pressure. SBP>150 MM/HG & OR DBP> 95 MM/HG, Disp: 90 tablet, Rfl: 3    Crestor 20 MG tablet, Take 1 tablet by mouth Daily., Disp: 30 tablet, Rfl: 4    cyclobenzaprine (FLEXERIL) 10 MG tablet, Take 1 tablet by mouth At Night As Needed for Muscle Spasms., Disp: 30 tablet, Rfl: 0    DULoxetine (CYMBALTA) 60 MG capsule, Take 1 capsule by mouth Daily., Disp: , Rfl:     famotidine (PEPCID) 20 MG tablet, , Disp: , Rfl:     Farxiga 10 MG tablet, , Disp: , Rfl:     fexofenadine (ALLEGRA) 180 MG tablet, Take 1 tablet by mouth Daily As Needed., Disp: , Rfl:     glipizide (GLUCOTROL XL) 5 MG ER tablet, 1 tablet., Disp: , Rfl:     Kerendia 10 MG tablet, Take 1 tablet by mouth  "Daily., Disp: , Rfl:     linaclotide (LINZESS) 290 MCG capsule capsule, Every Morning, Disp: , Rfl:     metaxalone (SKELAXIN) 800 MG tablet, TAKE 1 TABLET BY MOUTH THREE TIMES DAILY AS NEEDED FOR MUSCLE PAIN, Disp: , Rfl:     metFORMIN (GLUCOPHAGE) 500 MG tablet, 1 tablet., Disp: , Rfl:     Olmesartan-amLODIPine-HCTZ 20-5-12.5 MG tablet, Take 1 tablet by mouth 2 (Two) Times a Day., Disp: 180 tablet, Rfl: 3    pantoprazole (PROTONIX) 40 MG EC tablet, Take 1 tablet by mouth Daily., Disp: , Rfl:     tamsulosin (FLOMAX) 0.4 MG capsule 24 hr capsule, , Disp: , Rfl:     temazepam (RESTORIL) 15 MG capsule, Take 1 capsule by mouth 2 (Two) Times a Day., Disp: , Rfl:     traZODone (DESYREL) 150 MG tablet, Take 1 tablet by mouth At Night As Needed for Sleep., Disp: , Rfl:     Allergies:   Allergies   Allergen Reactions    Levaquin [Levofloxacin] Itching and GI Intolerance    Crestor [Rosuvastatin] Myalgia     Generic     Amoxicillin-Pot Clavulanate Hives and Itching    Codeine     Contrast Dye (Echo Or Unknown Ct/Mr)     Hydrocodone Other (See Comments)    Lopressor [Metoprolol Tartrate]     Medrol [Methylprednisolone] Other (See Comments)     Blood in stool    Morphine And Codeine     Prednisone Other (See Comments)    Sulfa Antibiotics     Tirzepatide GI Intolerance    Lortab [Hydrocodone-Acetaminophen] Rash     Nose bleed       I have reviewed and updated the patient's chief complaint, history of present illness, review of systems, past medical history, surgical history, family history, social history, medications and allergy list as appropriate.     Objective      Vital Signs:   Vitals:    02/20/25 0829   BP: 102/70   Weight: 93.9 kg (207 lb 0.2 oz)   Height: 180.3 cm (70.98\")     Body mass index is 28.89 kg/m².  BMI is >= 25 and <30. (Overweight) The following options were offered after discussion;: exercise counseling/recommendations and nutrition counseling/recommendations     Patient reports that he is a former smoker. " He quit smoking in 2003.  He has not resumed smoking since that time.  This behavior was applauded and she was encouraged to continue in smoking cessation.  We will continue to monitor at subsequent visits.     Ortho Exam:  Gait and Station: Appearance: antalgic gait.   Cardiovascular System: Arterial Pulses Right: dorsalis pedis pulse normal. Varicosities Right: capillary refill test normal.   Lumbar Spine: Inspection: normal alignment. Tender over right SI joint.  Hip/Pelvis Appearance: Inspection: normal axial alignment.   Hips: Bony Palpation Right: no tenderness of the greater trochanter. Soft Tissue Palpation Right: tenderness of the hip flexor muscles. Active Range of Motion Right: limited (secondary to pain especially flexion and rotation). Strength Right: normal 5/5.   Skin: Right Lower Extremity: normal. Left Lower Extremity: normal.   Neurologic: Sensation on the Right: L1 normal, L2 normal, L3 normal, L4 normal, and S2 normal. Sensation on the Left: L1 normal, L2 normal, L3 normal, L4 normal, and S2 normal.    Results Review:   Imaging Results (Last 24 Hours)       Procedure Component Value Units Date/Time    US Guided Injection [[id number]] Resulted: 02/20/25 0922     Updated: 02/20/25 0922            Procedures    Assessment / Plan      Assessment/Plan:   Diagnoses and all orders for this visit:    1. Right hip pain (Primary)  -     US Guided Injection    2. Trochanteric bursitis of right hip    3. Encounter related to worker's compensation claim    4. Derangement of right SI joint  -     Ambulatory Referral to Physical Therapy for Evaluation & Treatment    5. Primary osteoarthritis of right hip  -     Ambulatory Referral to Physical Therapy for Evaluation & Treatment    Other orders  -     - Large Joint Arthrocentesis: R hip joint    Follow-up Worker's Compensation.  Overall much improved with the shoulder.  Still having significant issues with the hip and low back.  Cortisone injection at the  trochanteric bursa did help.  Most of the pain now in the anterior hip/groin from hip arthritis as well as pain over the SI joint causing radicular pain down the leg.  I will inject the hip joint with cortisone under ultrasound guidance to treat the arthritic pain.  We will redirect his physical therapy to focus on the SI joint and the radicular pain.  I will see him back in 6 weeks.  If not improved, consider additional imaging.    Follow Up:   Return in about 6 weeks (around 4/3/2025) for Recheck.      Mann Noel MD  [facility]

## 2025-02-20 NOTE — PROGRESS NOTES
Procedure   - Large Joint Arthrocentesis: R hip joint on 2/20/2025 9:00 AM  Indications: pain  Details: 22 G (Spinal & 25g ) needle, ultrasound-guided anterolateral approach  Medications: 2 mL lidocaine PF 1% 1 %; 2 mL bupivacaine (PF) 0.5 %; 80 mg triamcinolone acetonide 40 MG/ML  Outcome: tolerated well, no immediate complications  Procedure, treatment alternatives, risks and benefits explained, specific risks discussed. Consent was given by the patient. Immediately prior to procedure a time out was called to verify the correct patient, procedure, equipment, support staff and site/side marked as required. Patient was prepped and draped in the usual sterile fashion.

## 2025-02-20 NOTE — PROGRESS NOTES
Physical Therapy Daily Progress Note    [facility]  [address]  [place] [address]      Patient: Guy Sheth   : 1960  Diagnosis/ICD-10 Code:  Chronic left shoulder pain [M25.512, G89.29]  Referring practitioner: Lee Noel MD  Date of Initial Visit: Type: THERAPY  Noted: 2024  Today's Date: 2025  Patient seen for 25 sessions           Subjective   Patient reports being given R hip joint injection by ortho who advised PT to focus on pelvis region. R shoulder feels tired today. Patient reports HEP for R LE prolongs the time it takes for symptoms to subside when his leg is already hurting.    Objective   See Exercise, Manual, and Modality Logs for complete treatment.       Assessment/Plan  Regressed much of patient's right hip/lower quarter activities due to increased peripheral lower extremity symptoms.  Left periscapular motor control remains fair, as patient continues to use biceps/anterior arm muscles in favor of posterior periscapular muscles when appropriate.            Timed:  Manual Therapy:    0     mins  68941;  Therapeutic Exercise:    26     mins  12409;     Neuromuscular Arturo:   10    mins  98070;    Therapeutic Activity:     10     mins  16567;     Gait Trainin     mins  81094;     Ultrasound:     0     mins  36929;    Self Care Trainin     mins  07286;    Untimed:  Electrical Stimulation:    0     mins  61073 ( );  Mechanical Traction:    0     mins  98687;   Dry Needlin     mins   Re-evaluation                0      mins  41306;  Group Therapy              0      mins  46138;    Timed Treatment:   46   mins   Total Treatment:     46   mins    Alfonso Ricketts PT  Physical Therapist

## 2025-02-24 ENCOUNTER — TREATMENT (OUTPATIENT)
Dept: PHYSICAL THERAPY | Facility: CLINIC | Age: 65
End: 2025-02-24
Payer: OTHER MISCELLANEOUS

## 2025-02-24 ENCOUNTER — OFFICE VISIT (OUTPATIENT)
Age: 65
End: 2025-02-24
Payer: OTHER MISCELLANEOUS

## 2025-02-24 VITALS
BODY MASS INDEX: 28.98 KG/M2 | WEIGHT: 207.01 LBS | DIASTOLIC BLOOD PRESSURE: 70 MMHG | HEIGHT: 71 IN | SYSTOLIC BLOOD PRESSURE: 130 MMHG

## 2025-02-24 DIAGNOSIS — M25.562 ACUTE PAIN OF LEFT KNEE: ICD-10-CM

## 2025-02-24 DIAGNOSIS — M25.551 PAIN OF RIGHT HIP: ICD-10-CM

## 2025-02-24 DIAGNOSIS — M25.511 ACUTE PAIN OF RIGHT SHOULDER: ICD-10-CM

## 2025-02-24 DIAGNOSIS — M25.512 CHRONIC LEFT SHOULDER PAIN: Primary | ICD-10-CM

## 2025-02-24 DIAGNOSIS — S46.012A TRAUMATIC INCOMPLETE TEAR OF LEFT ROTATOR CUFF, INITIAL ENCOUNTER: ICD-10-CM

## 2025-02-24 DIAGNOSIS — G89.29 CHRONIC LEFT SHOULDER PAIN: Primary | ICD-10-CM

## 2025-02-24 DIAGNOSIS — M19.012 ARTHRITIS OF LEFT GLENOHUMERAL JOINT: ICD-10-CM

## 2025-02-24 DIAGNOSIS — M25.512 LEFT SHOULDER PAIN, UNSPECIFIED CHRONICITY: Primary | ICD-10-CM

## 2025-02-24 PROCEDURE — 97530 THERAPEUTIC ACTIVITIES: CPT | Performed by: PHYSICAL THERAPIST

## 2025-02-24 PROCEDURE — 97164 PT RE-EVAL EST PLAN CARE: CPT | Performed by: PHYSICAL THERAPIST

## 2025-02-24 PROCEDURE — 97110 THERAPEUTIC EXERCISES: CPT | Performed by: PHYSICAL THERAPIST

## 2025-02-24 PROCEDURE — 97112 NEUROMUSCULAR REEDUCATION: CPT | Performed by: PHYSICAL THERAPIST

## 2025-02-24 RX ORDER — BUPIVACAINE HYDROCHLORIDE 5 MG/ML
2 INJECTION, SOLUTION EPIDURAL; INTRACAUDAL
Status: COMPLETED | OUTPATIENT
Start: 2025-02-24 | End: 2025-02-24

## 2025-02-24 RX ORDER — TRIAMCINOLONE ACETONIDE 40 MG/ML
80 INJECTION, SUSPENSION INTRA-ARTICULAR; INTRAMUSCULAR
Status: COMPLETED | OUTPATIENT
Start: 2025-02-24 | End: 2025-02-24

## 2025-02-24 RX ORDER — LIDOCAINE HYDROCHLORIDE 10 MG/ML
2 INJECTION, SOLUTION EPIDURAL; INFILTRATION; INTRACAUDAL; PERINEURAL
Status: COMPLETED | OUTPATIENT
Start: 2025-02-24 | End: 2025-02-24

## 2025-02-24 RX ADMIN — TRIAMCINOLONE ACETONIDE 80 MG: 40 INJECTION, SUSPENSION INTRA-ARTICULAR; INTRAMUSCULAR at 12:00

## 2025-02-24 RX ADMIN — LIDOCAINE HYDROCHLORIDE 2 ML: 10 INJECTION, SOLUTION EPIDURAL; INFILTRATION; INTRACAUDAL; PERINEURAL at 12:00

## 2025-02-24 RX ADMIN — BUPIVACAINE HYDROCHLORIDE 2 ML: 5 INJECTION, SOLUTION EPIDURAL; INTRACAUDAL at 12:00

## 2025-02-24 NOTE — PROGRESS NOTES
Re-Assessment / Re-Certification      [facility]  [address]  [place] [address]    Patient: Guy Sheth   : 1960  Diagnosis/ICD-10 Code:  Chronic left shoulder pain [M25.512, G89.29]  Referring practitioner: Lee Noel MD  Date of Initial Visit: Type: THERAPY  Noted: 2024  Today's Date: 2025  Patient seen for 26 sessions      Subjective:     Subjective Questionnaire: LEFS: 2480 and QuickDASH: 52%  Clinical Progress: improved  Home Program Compliance: Yes  Treatment has included: therapeutic exercise, neuromuscular re-education, manual therapy, and therapeutic activity    Subjective Evaluation    History of Present Illness  Date of onset: 2024    Subjective comment: Patient reports R hip general pain has improved but R posterior hip symptoms that radiate into his R distal knee remain unchanged and limit sleep. R shoulder feels 70-80% improved. L shoulder has not changed recently. L knee hurts occasionally in the back of the knee, but it mainly feels weak.  Patient Occupation: , works 2nd shift   Precautions and Work Restrictions: nonePain  Current pain ratin (R hip: 7)  At best pain ratin (R/L shoulders and L knee: 0, R hip: 2)  At worst pain ratin (R hip: 10, L shoulder: 7, R shoulder: 4, L posterior knee: 4)       Objective          Tenderness     Left Shoulder   Tenderness in the biceps tendon (proximal) and bicipital groove.     Right Shoulder  Tenderness in the AC joint.     Right Hip   Tenderness in the greater trochanter.     Neurological Testing     Additional Neurological Details  (-) for symptom reproduction but neural tension present    Active Range of Motion   Left Shoulder   Flexion: 150 degrees with pain  Abduction: 100 degrees with pain  External rotation BTH: T3 with pain  Internal rotation BTB: L5 with pain    Right Shoulder   Flexion: 140 degrees with pain  Abduction: 135 degrees with pain  External  rotation BTH: T3 with pain  Internal rotation BTB: L5 with pain  Horizontal adduction: Right shoulder active horizontal adduction: touches L scapular spine. with pain  Left Knee   Flexion: WFL and with pain  Extension: WFL    Right Knee   Flexion: WFL  Extension: WFL    Additional Active Range of Motion Details  Flexion: touches distal to knees, significant relief of symptoms  Extension: neutral, peripheralization of R LE symptoms    Passive Range of Motion   Left Hip   Flexion: Left hip passive flexion: 110 deg, R hip/low back pain.   External rotation (90/90): WFL  Internal rotation (90/90): WFL    Right Hip   Flexion: 100 (120 deg, R groin pain) degrees   Extension: 0 (significant R low back pain radiating to distal of R knee) degrees   External rotation (90/90): 40 (R hip pain) degrees   Internal rotation (90/90): 10 (R hip pain) degrees   Left Knee   Flexion: WFL and with pain  Extension: WFL    Right Knee   Flexion: WFL  Extension: WFL    Strength/Myotome Testing     Left Shoulder     Planes of Motion   Extension: 5   Abduction: 4- (pain)   External rotation at 0°: 5   Internal rotation at 0°: 4- (pain)     Isolated Muscles   Biceps: 4+ (pain)     Right Shoulder     Planes of Motion   Extension: 5   Abduction: 5   External rotation at 0°: 5   Internal rotation at 0°: 4- (pain)     Right Hip   Planes of Motion   Flexion: 4-  Right hip abductors strength: unable to test due to pain attempting to hold up leg.  External rotation: 4+    Left Knee   Flexion: 5  Extension: 5 (slight knee pain)  Quadriceps contraction: good    Tests     Right Hip   Negative FADIR and scour.       Assessment & Plan       Assessment  Impairments: abnormal gait, abnormal muscle firing, abnormal or restricted ROM, activity intolerance, impaired balance, impaired physical strength, lacks appropriate home exercise program, pain with function, safety issue and weight-bearing intolerance   Functional limitations: carrying objects, walking,  pushing, uncomfortable because of pain, standing and stooping   Assessment details: Patient is a 64-year-old male who presents with chronic left knee pain that began on 6/6 when he stepped backward off of a truck at work, with his heel being caught in patient falling and hyperflexing his left knee.  He demonstrates full left knee ROM, but endrange flexion is painful.  He demonstrates reduced left quadriceps activation and strength.  Palpation of medial knee structures and patellar tendon reproduces much of his symptoms.    12/2- Patient demonstrating significant improvement in left knee strength, reporting significant reduction in symptom intensity/frequency and improved functional mobility.  Stepping into his truck remains moderately painful.    1/2- Added L shoulder to POC.  He demonstrates restricted and painful left shoulder abduction AROM/PROM and IR, with endrange PROM flexion and abduction also limited.  Left shoulder pain and weakness noted with resisted IR and abduction.  Imaging revealed left subscapularis and supraspinatus injuries varying severity, left biceps tendon subluxation, and infraspinatus/teres minor tendinopathy.    1/13- Reassessed due to recent change in patient status after falling and landing on his arms.  He demonstrates decline in left shoulder abduction AROM/PROM, in addition to strength into extension, abduction, IR, ER, and elbow flexion.  Referring provider was contacted and advised further monitoring at his next follow-up PT visit before deciding whether patient needs to follow-up with Ortho prior to his currently scheduled appointment.    1/30- Added R shoulder and hip to POC. R hip was irritable today, possibly due to receiving injection yesterday. R shoulder was mainly TTP at AC joint but also weak and limited with end range motion. Included R shoulder in today's interventions but held R hip to allow irritability to subside.    2/24- Patient demonstrating R shoulder and L knee  progress, but L shoulder and R hip/low back/LE symptoms and function have not recently changed.       Left shoulder AROM continues to be limited, especially into abduction and IR.  Resisted elbow flexion, shoulder ABD, and shoulder IR are his weakest and most painful directions of movement.     Right sided hip, low back, and lateral leg down to the shin symptoms are aggravated by prolonged standing/walking and any extension of lumbar spine, while these symptoms are significantly relieved with forward lumbar flexion.  Right hip joint signs are intermittently present, but mostly provoke right groin symptoms instead of posterior/lateral hip and peripheral LE symptoms.    Left knee functional strength has regressed over the past few weeks as treatment of other body regions has been been prioritized.  Will look to resume functional strengthening of left lower extremity in coming visits.  Barriers to therapy: Inconsistent schedule, chronicity  Prognosis: fair    Goals  Plan Goals: Short term goals (4 weeks)  1. Patient to be compliant with initial HEP. Met   2. Patient to demonstrate symmetrical and pain-free knee PROM.  In progress  3. Patient to improve LEFS to greater than or equal to 50/80.  Met    Long Term goals (8 weeks)  1. Patient to demonstrate symmetrical and pain-free knee AROM.  Met  2. Patient to demonstrate pain free and normal strength with MMTs.  Met  3. 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  In progress  4. Patient to demonstrate independence with home exercise program. In progress  5. Patient to improve LEFS to greater than or equal to 55/80. met      New Goals (8 weeks)  Short Term Goals (4 wks)  1. Patient will improve Quick Dash score to < 60 %.  2. Pt will demonstrate symmetrical, pain-free shoulder AROM.  3. Patient will be independent and compliant with initial home exercise program.     Long Term Goals (8 wks)  1. Patient to demonstrate normal  and pain free UE strength with MMTs.  2. Patient will improve Quick Dash score to < 45 %.  3.  Patient to report awakening less than 3X/week due to left shoulder pain.  4. Pt. will be independent and compliant with advanced home exercise program to facilitate self-management of symptoms.  5.  Patient to report completing all required work duties without functional impairment of left shoulder.      Plan  Therapy options: will be seen for skilled therapy services  Planned modality interventions: thermotherapy (hydrocollator packs), TENS, cryotherapy and dry needling  Planned therapy interventions: manual therapy, neuromuscular re-education, soft tissue mobilization, strengthening, stretching, therapeutic activities, joint mobilization, home exercise program, functional ROM exercises, balance/weight-bearing training, abdominal trunk stabilization, postural training, motor coordination training, spinal/joint mobilization, ADL retraining, transfer training, body mechanics training and gait training  Frequency: 2x week  Duration in weeks: 4  Treatment plan discussed with: patient      Progress toward previous goals: Partially Met        Timeframe: 1 month    PT Signature: Alfonso Ricketts, PT  PT License [id number]    Based upon review of the patient's progress and continued therapy plan, it is my medical opinion that Guy Sheth should continue physical therapy treatment at [facility]  [address] [place] [id number] [phone].    Signature: __________________________________  Lee Noel MD    Timed:  Manual Therapy:    0     mins  22794;  Therapeutic Exercise:    15     mins  32122;     Neuromuscular Arturo:    20    mins  53093;    Therapeutic Activity:     10     mins  53168;     Gait Trainin     mins  75360;     Ultrasound:     0     mins  05987;    Self Care Trainin     mins  47736;    Untimed:  Electrical Stimulation:    0      mins  38974 ( );  Mechanical Traction:    0     mins  26836;   Dry Needlin     mins   Re-evaluation                15      mins  97847;  Group Therapy              0      mins  89147;    Timed Treatment:   45   mins   Total Treatment:     60   mins

## 2025-02-24 NOTE — PROGRESS NOTES
Procedure   - Large Joint Arthrocentesis: L glenohumeral on 2/24/2025 12:00 PM  Indications: pain  Details: 21 G needle, ultrasound-guided posterior approach  Medications: 2 mL lidocaine PF 1% 1 %; 2 mL bupivacaine (PF) 0.5 %; 80 mg triamcinolone acetonide 40 MG/ML  Outcome: tolerated well, no immediate complications  Procedure, treatment alternatives, risks and benefits explained, specific risks discussed. Consent was given by the patient. Immediately prior to procedure a time out was called to verify the correct patient, procedure, equipment, support staff and site/side marked as required. Patient was prepped and draped in the usual sterile fashion.

## 2025-02-24 NOTE — PROGRESS NOTES
[facility] Follow Up Visit     Office Follow Up      Date: 02/24/2025   Patient Name: Guy Sheth  MRN: [id number]  YOB: 1960    Referring Physician: Mcihael Mcintosh DO     Chief Complaint:   Chief Complaint   Patient presents with    Follow-up     4 day f/u--8 week f/u Traumatic incomplete tear of left rotator cuff     History of Present Illness: Guy Sheth is a 64 y.o. male who returns to clinic today for follow up on left shoulder pain. His pain is a 6 /10 on the pain scale. Patient has tried the following previous treatments physical therapy cortisone injection. He mentions current symptoms of pain  and stiffness. He states that these treatments have Improved.    Subjective     Review of Systems: Review of Systems   Constitutional:  Negative for chills, fever, unexpected weight gain and unexpected weight loss.   HENT:  Negative for congestion, postnasal drip and rhinorrhea.    Eyes:  Negative for blurred vision.   Respiratory:  Negative for shortness of breath.    Cardiovascular:  Negative for leg swelling.   Gastrointestinal:  Negative for abdominal pain, nausea and vomiting.   Genitourinary:  Negative for difficulty urinating.   Musculoskeletal:  Positive for arthralgias. Negative for gait problem, joint swelling and myalgias.   Skin:  Negative for skin lesions and wound.   Neurological:  Negative for dizziness, weakness, light-headedness and numbness.   Hematological:  Does not bruise/bleed easily.   Psychiatric/Behavioral:  Negative for depressed mood.         Medications:   Current Outpatient Medications:     aspirin  MG tablet, Take 1 tablet by mouth Daily., Disp: , Rfl:     bisoprolol (ZEBeta) 10 MG tablet, Take 1 tablet by mouth Daily., Disp: 90 tablet, Rfl: 3    cetirizine (zyrTEC) 10 MG tablet, Take 1 tablet by mouth Daily., Disp: , Rfl:     cloNIDine (Catapres) 0.1 MG tablet, Take 1 tablet by mouth Every 8 (Eight)  Hours As Needed for High Blood Pressure. SBP>150 MM/HG & OR DBP> 95 MM/HG, Disp: 90 tablet, Rfl: 3    Crestor 20 MG tablet, Take 1 tablet by mouth Daily., Disp: 30 tablet, Rfl: 4    cyclobenzaprine (FLEXERIL) 10 MG tablet, Take 1 tablet by mouth At Night As Needed for Muscle Spasms., Disp: 30 tablet, Rfl: 0    DULoxetine (CYMBALTA) 60 MG capsule, Take 1 capsule by mouth Daily., Disp: , Rfl:     famotidine (PEPCID) 20 MG tablet, , Disp: , Rfl:     Farxiga 10 MG tablet, , Disp: , Rfl:     fexofenadine (ALLEGRA) 180 MG tablet, Take 1 tablet by mouth Daily As Needed., Disp: , Rfl:     glipizide (GLUCOTROL XL) 5 MG ER tablet, 1 tablet., Disp: , Rfl:     Kerendia 10 MG tablet, Take 1 tablet by mouth Daily., Disp: , Rfl:     linaclotide (LINZESS) 290 MCG capsule capsule, Every Morning, Disp: , Rfl:     metaxalone (SKELAXIN) 800 MG tablet, TAKE 1 TABLET BY MOUTH THREE TIMES DAILY AS NEEDED FOR MUSCLE PAIN, Disp: , Rfl:     metFORMIN (GLUCOPHAGE) 500 MG tablet, 1 tablet., Disp: , Rfl:     Olmesartan-amLODIPine-HCTZ 20-5-12.5 MG tablet, Take 1 tablet by mouth 2 (Two) Times a Day., Disp: 180 tablet, Rfl: 3    pantoprazole (PROTONIX) 40 MG EC tablet, Take 1 tablet by mouth Daily., Disp: , Rfl:     tamsulosin (FLOMAX) 0.4 MG capsule 24 hr capsule, , Disp: , Rfl:     temazepam (RESTORIL) 15 MG capsule, Take 1 capsule by mouth 2 (Two) Times a Day., Disp: , Rfl:     traZODone (DESYREL) 150 MG tablet, Take 1 tablet by mouth At Night As Needed for Sleep., Disp: , Rfl:     Allergies:   Allergies   Allergen Reactions    Levaquin [Levofloxacin] Itching and GI Intolerance    Crestor [Rosuvastatin] Myalgia     Generic     Amoxicillin-Pot Clavulanate Hives and Itching    Codeine     Contrast Dye (Echo Or Unknown Ct/Mr)     Hydrocodone Other (See Comments)    Lopressor [Metoprolol Tartrate]     Medrol [Methylprednisolone] Other (See Comments)     Blood in stool    Morphine And Codeine     Prednisone Other (See Comments)    Sulfa Antibiotics  "    Tirzepatide GI Intolerance    Lortab [Hydrocodone-Acetaminophen] Rash     Nose bleed       I have reviewed and updated the patient's chief complaint, history of present illness, review of systems, past medical history, surgical history, family history, social history, medications and allergy list as appropriate.     Objective      Vital Signs:   Vitals:    02/24/25 1125   BP: 130/70   Weight: 93.9 kg (207 lb 0.2 oz)   Height: 180.3 cm (70.98\")     Body mass index is 28.89 kg/m².  BMI is >= 25 and <30. (Overweight) The following options were offered after discussion;: exercise counseling/recommendations and nutrition counseling/recommendations     Patient reports that he is a former smoker. He quit smoking in 2003.  He has not resumed smoking since that time.  This behavior was applauded and she was encouraged to continue in smoking cessation.  We will continue to monitor at subsequent visits.    Ortho Exam:  Cardiovascular System: Arterial Pulses Right: radial normal. Arterial Pulses Left: radial normal.   C-Spine/Neck: Active Range of Motion: no crepitus or pain elicited on motion and flexion normal, extension normal, rotation normal, and lateral flexion normal.   Shoulders: Inspection Right: no misalignment, erythema, induration, swelling, or warmth and AC prominence normal. Inspection Left: no misalignment, erythema, induration, swelling, or warmth and AC prominence normal. Bony Palpation Right: no tenderness of the clavicle, the acromioclavicular joint, the greater tuberosity, or the bicipital groove. Bony Palpation Left: tenderness of the clavicle lateral one-third, the acromioclavicular joint, the greater tuberosity, and the bicipital groove and no tenderness of the sternoclavicular joint. Soft Tissue Palpation Left: tenderness of the supraspinatus, the infraspinatus, the glenohumeral joint region, and the lateral cuff insertion. Active Range of Motion Left: limited. Special Tests Right: Hawkin's test " positive and empty can sign positive. Special Tests Left: Hawkin's test positive, Neer's test positive, [place]'s test positive, and empty can sign positive.   exam RIGHT shoulder: forward flexion approximately 140 degrees. Abduction 140 degrees. Internal rotation L4. Motor testing supraspinatus 5/5  exam LEFT shoulder: forward flexion approximately 110 degrees. Abduction 110 degrees. Internal rotation SI. Motor testing supraspinatus 4/5    Results Review:   Imaging Results (Last 24 Hours)       Procedure Component Value Units Date/Time    US Guided Injection [[id number]] Resulted: 02/24/25 1230     Updated: 02/24/25 1230    Narrative:      US GUIDED LEFT GLENOHUMERAL JOINT CORTICOSTEROID INJECTION            Procedures    Assessment / Plan      Assessment/Plan:   Diagnoses and all orders for this visit:    1. Left shoulder pain, unspecified chronicity (Primary)  -     US Guided Injection    2. Traumatic incomplete tear of left rotator cuff, initial encounter    3. Arthritis of left glenohumeral joint    Other orders  -     - Large Joint Arthrocentesis: L glenohumeral    Worker's Compensation case.  Left shoulder pain from glenohumeral joint arthritis and partial tearing of the left rotator cuff.  Rotator cuff symptoms seem improved with physical therapy and prior subacromial corticosteroid injection.  Having more proximal biceps and glenohumeral joint pain today.  Continuing physical therapy.  I will inject the left glenohumeral joint with corticosteroid under ultrasound guidance.  Follow-up in 1 month to discuss both shoulders and right hip.  Restrictions/accommodations at work as before.    Follow Up:   No follow-ups on file.      Mann Noel MD  [facility]

## 2025-02-25 ENCOUNTER — TELEPHONE (OUTPATIENT)
Dept: CARDIOLOGY | Facility: CLINIC | Age: 65
End: 2025-02-25

## 2025-02-25 ENCOUNTER — HOSPITAL ENCOUNTER (OUTPATIENT)
Dept: NUCLEAR MEDICINE | Facility: HOSPITAL | Age: 65
Discharge: HOME | End: 2025-02-25
Payer: COMMERCIAL

## 2025-02-25 DIAGNOSIS — I25.10 CORONARY ARTERY DISEASE INVOLVING NATIVE CORONARY ARTERY OF NATIVE HEART WITHOUT ANGINA PECTORIS: ICD-10-CM

## 2025-02-25 LAB
BH CV REST NUCLEAR ISOTOPE DOSE: 10.3 MCI
BH CV STRESS COMMENTS STAGE 1: NORMAL
BH CV STRESS DOSE REGADENOSON STAGE 1: 0.4
BH CV STRESS DURATION MIN STAGE 1: 0
BH CV STRESS DURATION SEC STAGE 1: 10
BH CV STRESS NUCLEAR ISOTOPE DOSE: 34.6 MCI
BH CV STRESS PROTOCOL 1: NORMAL
BH CV STRESS RECOVERY BP: NORMAL MMHG
BH CV STRESS RECOVERY HR: 92 BPM
BH CV STRESS STAGE 1: 1
MAXIMAL PREDICTED HEART RATE: 156 BPM
PERCENT MAX PREDICTED HR: 71.79 %
SPECT HRT GATED+EF W RNC IV: 63 %
STRESS BASELINE BP: NORMAL MMHG
STRESS BASELINE HR: 76 BPM
STRESS PERCENT HR: 84 %
STRESS POST PEAK BP: NORMAL MMHG
STRESS POST PEAK HR: 112 BPM
STRESS TARGET HR: 133 BPM

## 2025-02-25 PROCEDURE — 93017 CV STRESS TEST TRACING ONLY: CPT

## 2025-02-25 PROCEDURE — 78452 HT MUSCLE IMAGE SPECT MULT: CPT

## 2025-02-25 PROCEDURE — A9500 TC99M SESTAMIBI: HCPCS | Performed by: INTERNAL MEDICINE

## 2025-02-25 PROCEDURE — 93018 CV STRESS TEST I&R ONLY: CPT | Performed by: INTERNAL MEDICINE

## 2025-02-25 PROCEDURE — 25010000002 REGADENOSON 0.4 MG/5ML SOLUTION: Performed by: INTERNAL MEDICINE

## 2025-02-25 PROCEDURE — 34310000005 TECHNETIUM SESTAMIBI: Performed by: INTERNAL MEDICINE

## 2025-02-25 PROCEDURE — 78452 HT MUSCLE IMAGE SPECT MULT: CPT | Performed by: INTERNAL MEDICINE

## 2025-02-25 RX ORDER — REGADENOSON 0.08 MG/ML
0.4 INJECTION, SOLUTION INTRAVENOUS
Status: COMPLETED | OUTPATIENT
Start: 2025-02-25 | End: 2025-02-25

## 2025-02-25 RX ADMIN — REGADENOSON 0.4 MG: 0.08 INJECTION, SOLUTION INTRAVENOUS at 08:34

## 2025-02-25 RX ADMIN — TECHNETIUM TC 99M SESTAMIBI 1 DOSE: 1 INJECTION INTRAVENOUS at 07:10

## 2025-02-25 RX ADMIN — TECHNETIUM TC 99M SESTAMIBI 1 DOSE: 1 INJECTION INTRAVENOUS at 08:34

## 2025-02-25 NOTE — TELEPHONE ENCOUNTER
PTS WIFE STOPPED IN ASKING IF DR WEINBERG COULD WRITE A LETTER FOR [employer] STATING THAT HE COULD NOT DO A TREADMILL STRESS TEST BECAUSE OF HIS HIP WORK INJURY. PT DID THE NUCLEAR STRESS TEST TODAY 2/25/25 AND IS EXPECTING THE BILL TO BE [id number]. THIS LETTER IS NEEDED ASAP TO GIVE TO . PLEASE MAIL TO PTS MAILING ADDRESS WHICH WAS VERIFIED.

## 2025-02-26 DIAGNOSIS — M75.81 TENDINITIS OF RIGHT ROTATOR CUFF: ICD-10-CM

## 2025-02-26 RX ORDER — CYCLOBENZAPRINE HCL 10 MG
10 TABLET ORAL NIGHTLY PRN
Qty: 30 TABLET | Refills: 0 | Status: SHIPPED | OUTPATIENT
Start: 2025-02-26

## 2025-02-26 NOTE — TELEPHONE ENCOUNTER
Patient call in an requested refill of cyclobenzaprine      Please advise. Patient requests a callback    Best Callback number: [phone]    Preferred Pharmacy:  [address] [phone] [facility] [phone]  [phone]

## 2025-02-27 ENCOUNTER — TELEPHONE (OUTPATIENT)
Dept: CARDIOLOGY | Facility: CLINIC | Age: 65
End: 2025-02-27
Payer: COMMERCIAL

## 2025-02-27 ENCOUNTER — TELEPHONE (OUTPATIENT)
Age: 65
End: 2025-02-27
Payer: COMMERCIAL

## 2025-02-27 ENCOUNTER — TREATMENT (OUTPATIENT)
Dept: PHYSICAL THERAPY | Facility: CLINIC | Age: 65
End: 2025-02-27
Payer: OTHER MISCELLANEOUS

## 2025-02-27 DIAGNOSIS — S46.012A TRAUMATIC INCOMPLETE TEAR OF LEFT ROTATOR CUFF, INITIAL ENCOUNTER: ICD-10-CM

## 2025-02-27 DIAGNOSIS — M75.81 TENDINITIS OF RIGHT ROTATOR CUFF: Primary | ICD-10-CM

## 2025-02-27 DIAGNOSIS — M25.512 CHRONIC LEFT SHOULDER PAIN: Primary | ICD-10-CM

## 2025-02-27 DIAGNOSIS — G89.29 CHRONIC LEFT SHOULDER PAIN: Primary | ICD-10-CM

## 2025-02-27 DIAGNOSIS — M25.551 PAIN OF RIGHT HIP: ICD-10-CM

## 2025-02-27 DIAGNOSIS — M25.562 ACUTE PAIN OF LEFT KNEE: ICD-10-CM

## 2025-02-27 DIAGNOSIS — M25.511 ACUTE PAIN OF RIGHT SHOULDER: ICD-10-CM

## 2025-02-27 DIAGNOSIS — M70.61 TROCHANTERIC BURSITIS OF RIGHT HIP: ICD-10-CM

## 2025-02-27 NOTE — TELEPHONE ENCOUNTER
Caller: Guy Sheth    Relationship: Self    Best call back number: [phone]    What form or medical record are you requesting: STATEMENT AS TO WHY HE DID NOT DO THE TREADMILL (INJURY TO HIP)    Who is requesting this form or medical record from you: WORKMEN'S COMPENSATION    How would you like to receive the form or medical records (pick-up, mail, fax):   If fax, what is the fax number:   If mail, what is the address:   If pick-up, provide patient with address and location details    Timeframe paperwork needed:     Additional notes: ASKING IF THIS CAN BE DONE TODAY.

## 2025-02-27 NOTE — TELEPHONE ENCOUNTER
I notified Shani with [facility] PT that order was placed in chart.    Luciana BAY (OBI) ROT

## 2025-02-27 NOTE — PROGRESS NOTES
Physical Therapy Daily Progress Note    [facility]  [address]  [place] [address]      Patient: Guy Sheth   : 1960  Diagnosis/ICD-10 Code:  Chronic left shoulder pain [M25.512, G89.29]  Referring practitioner: Lee Noel MD  Date of Initial Visit: Type: THERAPY  Noted: 2024  Today's Date: 2025  Patient seen for 27 sessions           Subjective   Patient reports ortho has ordered further PT for his shoulders and R hip. He received another injection into his L shoulder which has helped his symptoms.     Objective   See Exercise, Manual, and Modality Logs for complete treatment.       Assessment/Plan  Progressed B shoulder strengthening, especially rotator cuff, with fatigue but good tolerance. Patient demonstrated increased activity tolerance related to his R LE compared to recent visits as well, though most of these remain in supine/hooklying.            Timed:  Manual Therapy:    0     mins  78455;  Therapeutic Exercise:    30     mins  06984;     Neuromuscular Arturo:   15    mins  76092;    Therapeutic Activity:     10     mins  26780;     Gait Trainin     mins  56107;     Ultrasound:     0     mins  49058;    Self Care Trainin     mins  23541;    Untimed:  Electrical Stimulation:    0     mins  67789 ( );  Mechanical Traction:    0     mins  71763;   Dry Needlin     mins   Re-evaluation                0      mins  15685;  Group Therapy              0      mins  39411;    Timed Treatment:   55   mins   Total Treatment:     55   mins    Alfonso Ricketts PT  Physical Therapist

## 2025-02-27 NOTE — TELEPHONE ENCOUNTER
Can you put in a new order for physical therapy for this patient here at [facility] for them to submit to ?    Luciana SHELL) ROT     [place] eMERGENCY dEPARTMENT encounter:    [facility] EMERGENCY DEPARTMENT  [address] [address]  [phone]      CHIEF COMPLAINT:    Chief Complaint   Patient presents with    Neurologic Problem         HPI:    James Gates is a 38 year old male presenting [facility] the ED [facility] with complaints of persistent myoclonic jerking.  Patient has a history of myoclonic jerking secondary to anoxic brain injury and is well known to this Emergency Department.  Patient received three 2 mg oral tablets of ativan at the group home without resolution of myoclonic jerking so EMS was called.  EMS administered 2 mg IV versed with slight improvement in myoclonic jerking.  Symptoms then got worse so another 2 mg IV versed were given in 1 mg increments.  Patient still exhibiting myoclonic jerking on arrival in the ED.  Patient notes full body pain due to the jerking.  Patient denies headache, dizziness/lightheadedness, chest pain, shortness of breath, nausea, vomiting.    ALLERGIES:      ALLERGIES:   Allergen Reactions    Amoxicillin PRURITUS    Tramadol PRURITUS     Itchy tongue     Ciprofloxacin Other (See Comments)     Unknown reaction.    Metronidazole Other (See Comments)     Unknown reaction.         CURRENT MEDICATIONS:      Current Facility-Administered Medications   Medication    LORazepam (ATIVAN) injection 1 mg     Current Outpatient Medications   Medication Sig Dispense Refill    LORazepam (ATIVAN) 2 MG tablet Take 1 tablet as needed for myoclonic jerks lasting more than 5 min. Can repeat 1 tablet 20 minutes later if still persistent. If jerking continues can take 1 more tab 1 hour after. 60 tablet 0    Linzess 145 MCG capsule Take 1 capsule by mouth daily (before breakfast). 30 capsule 0    Multiple Vitamins-Minerals (VItaJoy Multi Gummies Adult) Chew Tab Chew 1 tablet by mouth daily. 30 tablet 0    Sennosides (Senna) 8.6 MG Tab Take 2  tablets by mouth daily. 60 tablet 0    Baclofen 5 MG tablet Take 1 tablet by mouth in the morning and 1 tablet at noon and 1 tablet in the evening. 90 tablet 0    amantadine (SYMMETREL) 100 MG capsule Take 1 capsule by mouth in the morning and 1 capsule in the evening. 60 capsule 0    levetiracetam (KEPPRA) 1000 MG tablet Take 1.5 tablet in am, 1 tablet at noon, 1.5 tablet in pm 120 tablet 0    cloBAZam (ONFI) 10 MG tablet Take 1 tablet by mouth in the morning and 1 tablet in the evening. 60 tablet 0    HYDROcodone-acetaminophen (NORCO) 5-325 MG per tablet Take 1 tablet by mouth every 8 hours as needed for Pain. 30 tablet 0    baclofen (LIORESAL) 10 MG tablet Take 1 tablet by mouth in the morning and 1 tablet at noon and 1 tablet in the evening. 90 tablet 0    baclofen (LIORESAL) 10 MG tablet Take 1 tablet by mouth in the morning and 1 tablet at noon and 1 tablet in the evening. 90 tablet 0    budesonide (PULMICORT) 0.5 MG/2ML nebulizer suspension Take 2 mLs by nebulization in the morning and 2 mLs in the evening. 120 mL 11    tobramycin-dexamethasone (TOBRADEX) ophthalmic suspension [None received]      latanoprost (XALATAN) 0.005 % ophthalmic solution Place 1 drop into both eyes every morning. 2.5 mL 0    LORazepam (ATIVAN) 1 MG tablet Take 1 tablet by mouth as needed for myoclonic jerks lasting more than 5 min. Can repeat 1 tablet 15 min later if still persistent. If jerking continues can take 1 more tablet 15 min later if not drowsy.  Limit use to no more than 3 tablets in 24 hours 34 tablet 0    linaclotide (Linzess) 145 MCG capsule Take 1 capsule by mouth daily (before breakfast). 30 capsule 0    Sodium Phosphates (Enema Disposable) Enema Place 1 enema rectally as needed (After 5 days of constipation). 1 enema 0    naLOXone (NARCAN) 4 MG/0.1ML nasal liquid Spray the content of 1 device into 1 nostril. Call 911. May repeat with 2nd device in alternate nostril if no response in 2-3 minutes. 2 each 1     levETIRAcetam (KepPRA) 500 MG tablet Take 1 tablet by mouth 1 time for 1 dose. 1 tablet 0    Nutritional Supplements (Ensure High Protein) Liquid Take 2 Bottles by mouth 3 times daily as needed (When having difficulty with eating solids). 237 mL 0    montelukast (SINGULAIR) 10 MG tablet Take 1 tablet by mouth nightly. 7 tablet 0    LORazepam (ATIVAN) 1 MG tablet 1 tab po prn myoclonic jerks lasting more than 5 min. Can repeat 1 tab 15 min later if still persistent. If jerking continues can take 1 more tab 15 min later if not drowsy. 21 tablet 3    neomycin/polymixinB/HC/bacitracin (CORTISPORIN) 1 % ophthalmic ointment Place 0.5 inches into right eye in the morning and 0.5 inches in the evening. Also apply warm, moist compress for 5-10 minutes twice a day 3.5 g 0    erythromycin (ILOTYCIN) ophthalmic ointment Place 0.25 inches into right eye every 8 hours. X 10 days 3.5 g 1    acetaminophen (TYLENOL) 325 MG tablet Take 2 tablets by mouth every 6 hours as needed for Pain or Fever. 30 tablet 3    polyethylene glycol (MIRALAX) 17 GM/SCOOP powder Take 17 g by mouth daily. 238 g 8    D3 Super Strength 50 mcg (2,000 units) capsule Take 1 capsule by mouth daily. 30 capsule 11    escitalopram (LEXAPRO) 10 MG tablet Take 1 tablet by mouth daily. 30 tablet 11    ipratropium-albuterol (DUONEB) 0.5-2.5 (3) MG/3ML nebulizer solution Take 3 mLs by nebulization every 6 hours as needed for Wheezing or Shortness of Breath. 360 mL 11    ibuprofen (MOTRIN) 800 MG tablet Take 0.5 tablets by mouth every 6 hours as needed for Pain. 90 tablet 3    lactulose (CHRONULAC) 10 GM/15ML solution Take 22.5 mLs by mouth daily as needed (constipation). 236 mL 0    magnesium hydroxide (MILK OF MAGNESIA) 400 MG/5ML suspension Take 15 mLs by mouth daily as needed for Constipation. 769 mL 1    albuterol (ACCUNEB) 1.25 MG/3ML nebulizer solution Take 1.25 mg by nebulization every 6 hours as needed for Wheezing.      bisacodyl (DULCOLAX) 10 MG suppository  Place 10 mg rectally daily as needed for Constipation.      Zinc Oxide (Desitin) 40 % Paste Apply topically 3 times daily as needed. Indications: Apply to red or sore on buttocks      ondansetron (ZOFRAN) 4 MG tablet Take 4 mg by mouth every 8 hours as needed for Nausea.      Bismuth Subsalicylate 525 MG/30ML Suspension Take 30 mLs by mouth every 6 hours as needed (loose stool).      Pediatric Multiple Vitamins (Childrens Chew Multivitamin) Chew Tab CHEW 1 TABLET BY MOUTH DAILY TO HELP BOOST IMMUNE SYSTEM 31 tablet 0    albuterol 108 (90 Base) MCG/ACT inhaler Inhale 2 puffs into the lungs every 4 hours as needed for Shortness of Breath or Wheezing. Indications: Asthma 1 each 11         PAST MEDICAL HISTORY:      Past Medical History:   Diagnosis Date    Acute embolism and thrombosis of unspecified vein     Anoxic brain injury (CMD) 2017    Anxiety     Constipation     Gastroesophageal reflux disease     Insomnia     Myoclonus     Nail dystrophy     Paralytic ileus  (CMD)     RAD (reactive airway disease) (CMD)     Seizures  (CMD)     Tinea unguium          SURGICAL HISTORY:      History reviewed. No pertinent surgical history.      SOCIAL HISTORY:    Social History     Tobacco Use    Smoking status: Never    Smokeless tobacco: Never   Vaping Use    Vaping status: never used   Substance Use Topics    Alcohol use: Not Currently    Drug use: Never         FAMILY HISTORY:    Family History   Problem Relation Age of Onset    Glaucoma Mother     Asthma Father     Cancer Sister         brain    Asthma Brother     Glaucoma Maternal Grandmother     Cancer, Prostate Maternal Grandfather     Cancer, Breast Paternal Grandmother     Asthma Paternal Grandfather     Diabetes Maternal Aunt     Cancer, Colon Maternal Uncle     Asthma Paternal Aunt     Cancer, Breast Paternal Aunt     Asthma Paternal Uncle     Cancer Maternal Great-Grandmother          PHYSICAL EXAM:   ED Triage Vitals [10/04/24 1802]   BP    Heart Rate 98   Resp 14    Temp 97.6 °F (36.4 °C)   SpO2 95 %     Physical Exam  Vitals and nursing note reviewed.   Constitutional:       General: He is not in acute distress.     Appearance: Normal appearance. He is well-developed. He is not ill-appearing.   HENT:      Head: Normocephalic and atraumatic.      Mouth/Throat:      Lips: Pink.      Mouth: Mucous membranes are moist.      Pharynx: No pharyngeal swelling or oropharyngeal exudate.   Eyes:      Extraocular Movements: Extraocular movements intact.      Pupils: Pupils are equal, round, and reactive to light.   Cardiovascular:      Rate and Rhythm: Normal rate and regular rhythm.      Heart sounds: Normal heart sounds.   Pulmonary:      Effort: Pulmonary effort is normal.      Breath sounds: Normal breath sounds.   Abdominal:      General: Abdomen is flat. Bowel sounds are normal.      Palpations: Abdomen is soft.      Tenderness: There is no abdominal tenderness.   Skin:     General: Skin is warm.   Neurological:      General: No focal deficit present.      Mental Status: He is alert. Mental status is at baseline.      Cranial Nerves: Cranial nerves 2-12 are intact.      Sensory: Sensation is intact.      Motor: Motor function is intact.      Comments: Patient able to answer questions without difficulty.  Upper and lower extremity myoclonic jerking noted.  Upper and lower extremity strength 5/5 bilaterally.   Psychiatric:         Mood and Affect: Mood normal.         Behavior: Behavior normal.           LAB RESULTS:  No results found for this visit on 10/04/24.      RADIOLOGY  Imaging reviewed by me prior to radiology interpretation   No orders to display         ED PROCEDURES:    Procedures     ED COURSE & MEDICAL DECISION MAKING:   James Ross is a 38 year old male presenting to the ED via EMS from [facility] with complaints of persistent myoclonic jerking.  Patient has history of myoclonic jerking due to anoxic brain injury, sees neurology, is well known  to this ED.  Patient received three 2 mg oral tablets of ativan at the group home without resolution of myoclonic jerking so EMS was called.  EMS administered 2 mg IV versed with slight improvement.  Symptoms then got worse so another 2 mg IV versed were given in 1 mg increments.  Patient still exhibiting myoclonic jerking on arrival in the ED.  On initial physical exam, patient exhibits upper and lower extremity myoclonic jerking.  Patient has eyes open, still able to answer questions without difficulty.  Does not appear to be seizure-like activity.  Upper/lower extremity strength 5/5 bilaterally.  Discussed further plan to try another 1 mg IV ativan and then reassess.  Patient verbalizes understanding and agrees with plan.    18:30  Went in to re-evaluate patient.  Patient asleep and resting comfortably in bed.  Upon awakening, patient started exhibiting the myoclonic jerking.  Will try another 1 mg IV ativan.  Will consult neurology.  Patient verbalizes understanding and agrees with plan.    18:35  Spoke with Dr. Poole, neurologist on-call, and discussed with him patient presentation and physical exam.  Dr. Poole states patient has had enough benzos, symptoms are not life-threatening at this time, will likely go away over time.  Recommended IVF and monitoring patient's vitals.  Recheck in one hour, if vitals stable and symptoms have resolved, patient can be discharged back to group home.    19:40  Re-evaluated patient.  Patient asleep and resting comfortably in bed.  Upon awakening, no additional myoclonic jerking visualized.  Vitals have remained stable throughout visit.  Patient comfortable being discharged back to group home.  Instructed patient to continue taking all medications as previously prescribed.  Discussed with patient reasons to return to the ED.  Patient verbalizes understanding and agrees with plan.  Patient stable for discharge home.    Medications   LORazepam (ATIVAN) injection 1 mg (1 mg  Intravenous Not Given 10/4/24 1909)   LORazepam (ATIVAN) injection 1 mg (1 mg Intravenous Given 10/4/24 1820)   sodium chloride (NORMAL SALINE) 0.9 % bolus 1,000 mL (0 mLs Intravenous Completed 10/4/24 1930)           DIAGNOSIS:  1. Myoclonic jerking           Elana Watters PA-C  10/05/24 0047

## 2025-02-27 NOTE — TELEPHONE ENCOUNTER
Attempted to reach patient but had to leave a voicemail.  Office documentation indicates the reason a nuclear stress test was ordered.  If additional information is required for work comp, request that information be submitted to the office.

## 2025-02-28 ENCOUNTER — TELEPHONE (OUTPATIENT)
Dept: CARDIOLOGY | Facility: CLINIC | Age: 65
End: 2025-02-28
Payer: COMMERCIAL

## 2025-02-28 NOTE — TELEPHONE ENCOUNTER
Caller: Guy Sheth    Relationship: Self    Best call back number: [phone]     What is the best time to reach you: ANYTIME    Who are you requesting to speak with (clinical staff, provider,  specific staff member): WHIT    Do you know the name of the person who called: WHIT, PRACTICE MANAGER    What was the call regarding: RETURNING CALL CONCERNING WORKERS COMP -- STRESS TEST. HUB ATTEMPTED TO WT TO OFFICE. WAS TOLD WHIT IS OUT BUT WILL BE BACK MONDAY. PLEASE CALL PT AND ADVISE    Is it okay if the provider responds through MyChart: NO

## 2025-03-03 NOTE — TELEPHONE ENCOUNTER
Caller: Guy Sheth    Relationship: Self    Best call back number: [phone]    What is the best time to reach you: AFTERNOON    Who are you requesting to speak with (clinical staff, provider,  specific staff member): WHIT    Do you know the name of the person who called: WHIT    What was the call regarding: PATIENT CALLED AGAIN TO SPEAK TO WHIT. HE WAS TOLD TO CALL TODAY SINCE SHE WAS NOT IN THE OFFICE ON FRIDAY. PLEASE REACH OUT TO HIM WHEN POSSIBLE. THANK YOU    Is it okay if the provider responds through [employer]: PLEASE CALL

## 2025-03-06 ENCOUNTER — TREATMENT (OUTPATIENT)
Dept: PHYSICAL THERAPY | Facility: CLINIC | Age: 65
End: 2025-03-06
Payer: OTHER MISCELLANEOUS

## 2025-03-06 DIAGNOSIS — M25.511 ACUTE PAIN OF RIGHT SHOULDER: ICD-10-CM

## 2025-03-06 DIAGNOSIS — M25.512 CHRONIC LEFT SHOULDER PAIN: Primary | ICD-10-CM

## 2025-03-06 DIAGNOSIS — M25.551 PAIN OF RIGHT HIP: ICD-10-CM

## 2025-03-06 DIAGNOSIS — G89.29 CHRONIC LEFT SHOULDER PAIN: Primary | ICD-10-CM

## 2025-03-06 DIAGNOSIS — M25.562 ACUTE PAIN OF LEFT KNEE: ICD-10-CM

## 2025-03-06 NOTE — PROGRESS NOTES
Physical Therapy Daily Progress Note    [facility]  [address]  [place] [address]      Patient: Guy Sheth   : 1960  Diagnosis/ICD-10 Code:  Chronic left shoulder pain [M25.512, G89.29]  Referring practitioner: No ref. provider found  Date of Initial Visit: Type: THERAPY  Noted: 2024  Today's Date: 3/6/2025  Patient seen for 28 sessions           Subjective   Patient reports L shoulder and R hip are gradually improving. Pain still radiates into R LE but less frequently and with decreased intensity. L shoulder still hurts with certain movements. R shoulder feels mostly recovered, about 95% per patient. L knee feels weak but pain is very low.    Objective          Functional Assessment     Comments  Overhead lift: successful overhead lift of 10 lb dumbbell with each arm without pain      See Exercise, Manual, and Modality Logs for complete treatment.       Assessment/Plan  Tested overhead lifting capacity to an extent, per patient request for work. He was able to lift 10 lb overhead without each arm without pain.    Continued emphasis on B shoulder strengthening, lumbopelvic mobility, and B LE strengthening with no increased pain.        Timed:  Manual Therapy:    0     mins  66891;  Therapeutic Exercise:    30     mins  30147;     Neuromuscular Arturo:   10    mins  81115;    Therapeutic Activity:     15     mins  11445;     Gait Trainin     mins  40456;     Ultrasound:     0     mins  53343;    Self Care Trainin     mins  78403;    Untimed:  Electrical Stimulation:    0     mins  57853 ( );  Mechanical Traction:    0     mins  94206;   Dry Needlin     mins   Re-evaluation                0      mins  95339;  Group Therapy              0      mins  49949;    Timed Treatment:   55   mins   Total Treatment:     55   mins    Alfonso Ricketts PT  Physical Therapist

## 2025-03-10 DIAGNOSIS — M70.61 TROCHANTERIC BURSITIS OF RIGHT HIP: Primary | ICD-10-CM

## 2025-03-17 ENCOUNTER — OFFICE VISIT (OUTPATIENT)
Dept: CARDIOLOGY | Facility: CLINIC | Age: 65
End: 2025-03-17
Payer: COMMERCIAL

## 2025-03-17 VITALS
BODY MASS INDEX: 28.63 KG/M2 | WEIGHT: 200 LBS | HEIGHT: 70 IN | HEART RATE: 94 BPM | DIASTOLIC BLOOD PRESSURE: 82 MMHG | OXYGEN SATURATION: 98 % | SYSTOLIC BLOOD PRESSURE: 110 MMHG

## 2025-03-17 DIAGNOSIS — I25.10 CORONARY ARTERY DISEASE INVOLVING NATIVE CORONARY ARTERY OF NATIVE HEART WITHOUT ANGINA PECTORIS: ICD-10-CM

## 2025-03-17 DIAGNOSIS — I10 ESSENTIAL HYPERTENSION: ICD-10-CM

## 2025-03-17 DIAGNOSIS — E78.5 DYSLIPIDEMIA: ICD-10-CM

## 2025-03-17 DIAGNOSIS — Z02.4 ENCOUNTER FOR COMMERCIAL DRIVING LICENSE (CDL) EXAM: ICD-10-CM

## 2025-03-17 DIAGNOSIS — I65.22 CAROTID ARTERY CALCIFICATION, LEFT: Primary | ICD-10-CM

## 2025-03-17 PROCEDURE — 99213 OFFICE O/P EST LOW 20 MIN: CPT | Performed by: INTERNAL MEDICINE

## 2025-03-17 RX ORDER — BUDESONIDE AND FORMOTEROL FUMARATE 80; 4.5 UG/1; UG/1
AEROSOL, METERED RESPIRATORY (INHALATION)
COMMUNITY
Start: 2025-03-10

## 2025-03-17 RX ORDER — CEFDINIR 300 MG/1
1 CAPSULE ORAL EVERY 12 HOURS SCHEDULED
COMMUNITY
Start: 2025-03-10

## 2025-03-17 NOTE — PROGRESS NOTES
Subjective:     Encounter Date:03/17/2025      Patient ID: Guy Sheth is a 65 y.o. male.    Chief Complaint: Coronary artery disease  HPI  This is a 65-year-old male patient who presents to cardiology clinic for routine follow-up after outpatient vasodilator nuclear stress test performed as a requirement to maintain a 's license.  The patient had previously had yearly treadmill exercise stress testing but this was not an option as he had developed an orthopedic condition which prevented him from doing treadmill stress testing.  The patient underwent a vasodilator nuclear stress test showing no evidence of ischemia or infarction.  The calculated ejection fraction was normal.  He remains asymptomatic from a cardiovascular perspective.  He reports compliance with his medications, with no perceived side effects.  The following portions of the patient's history were reviewed and updated as appropriate: allergies, current medications, past family history, past medical history, past social history, past surgical history and problem  Review of Systems   Constitutional: Negative for chills, diaphoresis, fever, malaise/fatigue, weight gain and weight loss.   HENT:  Negative for ear discharge, hearing loss, hoarse voice and nosebleeds.    Eyes:  Negative for discharge, double vision, pain and photophobia.   Cardiovascular:  Negative for chest pain, claudication, cyanosis, dyspnea on exertion, irregular heartbeat, leg swelling, near-syncope, orthopnea, palpitations, paroxysmal nocturnal dyspnea and syncope.   Respiratory:  Negative for cough, hemoptysis, sputum production and wheezing.    Endocrine: Negative for cold intolerance, heat intolerance, polydipsia, polyphagia and polyuria.   Hematologic/Lymphatic: Negative for adenopathy and bleeding problem. Does not bruise/bleed easily.   Skin:  Negative for color change, flushing, itching and rash.   Musculoskeletal:  Negative for muscle cramps, muscle  weakness, myalgias and stiffness.   Gastrointestinal:  Negative for abdominal pain, diarrhea, hematemesis, hematochezia, nausea and vomiting.   Genitourinary:  Negative for dysuria, frequency and nocturia.   Neurological:  Negative for focal weakness, loss of balance, numbness, paresthesias and seizures.   Psychiatric/Behavioral:  Negative for altered mental status, hallucinations and suicidal ideas.    Allergic/Immunologic: Negative for HIV exposure, hives and persistent infections.           Current Outpatient Medications:     aspirin  MG tablet, Take 1 tablet by mouth Daily., Disp: , Rfl:     bisoprolol (ZEBeta) 10 MG tablet, Take 1 tablet by mouth Daily., Disp: 90 tablet, Rfl: 3    Breyna 80-4.5 MCG/ACT inhaler, , Disp: , Rfl:     cefdinir (OMNICEF) 300 MG capsule, Take 1 capsule by mouth Every 12 (Twelve) Hours., Disp: , Rfl:     cetirizine (zyrTEC) 10 MG tablet, Take 1 tablet by mouth Daily., Disp: , Rfl:     cloNIDine (Catapres) 0.1 MG tablet, Take 1 tablet by mouth Every 8 (Eight) Hours As Needed for High Blood Pressure. SBP>150 MM/HG & OR DBP> 95 MM/HG, Disp: 90 tablet, Rfl: 3    Crestor 20 MG tablet, Take 1 tablet by mouth Daily., Disp: 30 tablet, Rfl: 4    cyclobenzaprine (FLEXERIL) 10 MG tablet, Take 1 tablet by mouth At Night As Needed for Muscle Spasms., Disp: 30 tablet, Rfl: 0    DULoxetine (CYMBALTA) 60 MG capsule, Take 1 capsule by mouth Daily., Disp: , Rfl:     famotidine (PEPCID) 20 MG tablet, , Disp: , Rfl:     Farxiga 10 MG tablet, , Disp: , Rfl:     glipizide (GLUCOTROL XL) 5 MG ER tablet, 1 tablet., Disp: , Rfl:     Kerendia 10 MG tablet, Take 1 tablet by mouth Daily., Disp: , Rfl:     linaclotide (LINZESS) 290 MCG capsule capsule, Every Morning, Disp: , Rfl:     metaxalone (SKELAXIN) 800 MG tablet, TAKE 1 TABLET BY MOUTH THREE TIMES DAILY AS NEEDED FOR MUSCLE PAIN, Disp: , Rfl:     metFORMIN (GLUCOPHAGE) 500 MG tablet, 1 tablet., Disp: , Rfl:     Olmesartan-amLODIPine-HCTZ 20-5-12.5 MG  "tablet, Take 1 tablet by mouth 2 (Two) Times a Day., Disp: 180 tablet, Rfl: 3    pantoprazole (PROTONIX) 40 MG EC tablet, Take 1 tablet by mouth Daily., Disp: , Rfl:     tamsulosin (FLOMAX) 0.4 MG capsule 24 hr capsule, , Disp: , Rfl:     temazepam (RESTORIL) 15 MG capsule, Take 1 capsule by mouth 2 (Two) Times a Day., Disp: , Rfl:     traZODone (DESYREL) 150 MG tablet, Take 1 tablet by mouth At Night As Needed for Sleep., Disp: , Rfl:     Objective:   Vitals and nursing note reviewed.   Constitutional:       Appearance: Healthy appearance. Not in distress.   Neck:      Vascular: No JVR. JVD normal.   Pulmonary:      Effort: Pulmonary effort is normal.      Breath sounds: Normal breath sounds. No wheezing. No rhonchi. No rales.   Chest:      Chest wall: Not tender to palpatation.   Cardiovascular:      PMI at left midclavicular line. Normal rate. Regular rhythm. Normal S1. Normal S2.       Murmurs: There is no murmur.      No gallop.  No click. No rub.   Pulses:     Intact distal pulses.   Edema:     Peripheral edema absent.   Abdominal:      General: Bowel sounds are normal.      Palpations: Abdomen is soft.      Tenderness: There is no abdominal tenderness.   Musculoskeletal: Normal range of motion.         General: No tenderness. Skin:     General: Skin is warm and dry.   Neurological:      General: No focal deficit present.      Mental Status: Alert and oriented to person, place and time.       Blood pressure 110/82, pulse 94, height 177.8 cm (70\"), weight 90.7 kg (200 lb), SpO2 98%.   Lab Review:     Assessment:       1. Carotid artery calcification, left  Asymptomatic.    2. Coronary artery disease involving native coronary artery of native heart without angina pectoris  Angina free with active lifestyle.  No evidence of ischemic heart disease by nuclear stress testing.    3. Dyslipidemia  Tolerating statin based cholesterol-lowering therapy without side effects.    4. Essential hypertension  Acceptable blood " pressure control.    5. 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    Procedures    Plan:     Advance Care Planning   ACP discussion was held with the patient during this visit. Patient has an advance directive (not in EMR), copy requested.     The patient has been reassured regarding the benign nature of his cardiac test results.    No changes in medications made at today's visit.

## 2025-03-24 ENCOUNTER — OFFICE VISIT (OUTPATIENT)
Age: 65
End: 2025-03-24
Payer: OTHER MISCELLANEOUS

## 2025-03-24 VITALS
WEIGHT: 201.6 LBS | DIASTOLIC BLOOD PRESSURE: 80 MMHG | BODY MASS INDEX: 28.86 KG/M2 | HEIGHT: 70 IN | SYSTOLIC BLOOD PRESSURE: 126 MMHG

## 2025-03-24 DIAGNOSIS — S46.012A TRAUMATIC INCOMPLETE TEAR OF LEFT ROTATOR CUFF, INITIAL ENCOUNTER: ICD-10-CM

## 2025-03-24 DIAGNOSIS — M54.16 LUMBAR RADICULOPATHY: ICD-10-CM

## 2025-03-24 DIAGNOSIS — M70.61 TROCHANTERIC BURSITIS OF RIGHT HIP: Primary | ICD-10-CM

## 2025-03-24 DIAGNOSIS — M75.81 TENDINITIS OF RIGHT ROTATOR CUFF: ICD-10-CM

## 2025-03-24 DIAGNOSIS — M16.11 PRIMARY OSTEOARTHRITIS OF RIGHT HIP: ICD-10-CM

## 2025-03-24 PROCEDURE — 99213 OFFICE O/P EST LOW 20 MIN: CPT | Performed by: STUDENT IN AN ORGANIZED HEALTH CARE EDUCATION/TRAINING PROGRAM

## 2025-03-24 NOTE — PROGRESS NOTES
[facility] [place] Up Visit     Office Follow Up      Date: 03/24/2025   Patient Name: Guy Sheth  MRN: [id number]  YOB: 1960    Referring Physician: Michael Mcintosh DO     Chief Complaint:   Chief Complaint   Patient presents with   • Follow-up     1 month follow up Bilateral shoulders and right hip     History of Present Illness: Guy Sheth is a 65 y.o. male who returns to clinic today for follow up on right hip pain. He pain is a 6 /10 on the pain scale. Patient has tried the following previous treatments anti-inflammatories, physical therapy , and injections: Cortisone. He mentions current symptoms of pain  and numbness / tingling. He states that these treatments have getting better.    Subjective     Review of Systems: Review of Systems   Constitutional:  Negative for chills, fever, unexpected weight gain and unexpected weight loss.   HENT:  Negative for congestion, postnasal drip and rhinorrhea.    Eyes:  Negative for blurred vision.   Respiratory:  Negative for shortness of breath.    Cardiovascular:  Negative for leg swelling.   Gastrointestinal:  Negative for abdominal pain, nausea and vomiting.   Genitourinary:  Negative for difficulty urinating.   Musculoskeletal:  Positive for arthralgias. Negative for gait problem, joint swelling and myalgias.   Skin:  Negative for skin lesions and wound.   Neurological:  Negative for dizziness, weakness, light-headedness and numbness.   Hematological:  Does not bruise/bleed easily.   Psychiatric/Behavioral:  Negative for depressed mood.         Medications:   Current Outpatient Medications:   •  aspirin  MG tablet, Take 1 tablet by mouth Daily., Disp: , Rfl:   •  bisoprolol (ZEBeta) 10 MG tablet, Take 1 tablet by mouth Daily., Disp: 90 tablet, Rfl: 3  •  Breyna 80-4.5 MCG/ACT inhaler, , Disp: , Rfl:   •  cefdinir (OMNICEF) 300 MG capsule, Take 1 capsule by mouth Every 12 (Twelve)  Hours., Disp: , Rfl:   •  cetirizine (zyrTEC) 10 MG tablet, Take 1 tablet by mouth Daily., Disp: , Rfl:   •  cloNIDine (Catapres) 0.1 MG tablet, Take 1 tablet by mouth Every 8 (Eight) Hours As Needed for High Blood Pressure. SBP>150 MM/HG & OR DBP> 95 MM/HG, Disp: 90 tablet, Rfl: 3  •  Crestor 20 MG tablet, Take 1 tablet by mouth Daily., Disp: 30 tablet, Rfl: 4  •  cyclobenzaprine (FLEXERIL) 10 MG tablet, Take 1 tablet by mouth At Night As Needed for Muscle Spasms., Disp: 30 tablet, Rfl: 0  •  DULoxetine (CYMBALTA) 60 MG capsule, Take 1 capsule by mouth Daily., Disp: , Rfl:   •  famotidine (PEPCID) 20 MG tablet, , Disp: , Rfl:   •  Farxiga 10 MG tablet, , Disp: , Rfl:   •  glipizide (GLUCOTROL XL) 5 MG ER tablet, 1 tablet., Disp: , Rfl:   •  Kerendia 10 MG tablet, Take 1 tablet by mouth Daily., Disp: , Rfl:   •  linaclotide (LINZESS) 290 MCG capsule capsule, Every Morning, Disp: , Rfl:   •  metaxalone (SKELAXIN) 800 MG tablet, TAKE 1 TABLET BY MOUTH THREE TIMES DAILY AS NEEDED FOR MUSCLE PAIN, Disp: , Rfl:   •  metFORMIN (GLUCOPHAGE) 500 MG tablet, 1 tablet., Disp: , Rfl:   •  Olmesartan-amLODIPine-HCTZ 20-5-12.5 MG tablet, Take 1 tablet by mouth 2 (Two) Times a Day., Disp: 180 tablet, Rfl: 3  •  pantoprazole (PROTONIX) 40 MG EC tablet, Take 1 tablet by mouth Daily., Disp: , Rfl:   •  tamsulosin (FLOMAX) 0.4 MG capsule 24 hr capsule, , Disp: , Rfl:   •  temazepam (RESTORIL) 15 MG capsule, Take 1 capsule by mouth 2 (Two) Times a Day., Disp: , Rfl:   •  traZODone (DESYREL) 150 MG tablet, Take 1 tablet by mouth At Night As Needed for Sleep., Disp: , Rfl:     Allergies:   Allergies   Allergen Reactions   • Levaquin [Levofloxacin] Itching and GI Intolerance   • Crestor [Rosuvastatin] Myalgia     Generic    • Amoxicillin-Pot Clavulanate Hives and Itching   • Codeine    • Contrast Dye (Echo Or Unknown Ct/Mr)    • Hydrocodone Other (See Comments)   • Lopressor [Metoprolol Tartrate]    • Medrol [Methylprednisolone] Other  "(See Comments)     Blood in stool   • Morphine And Codeine    • Prednisone Other (See Comments)   • Sulfa Antibiotics    • Tirzepatide GI Intolerance   • Lortab [Hydrocodone-Acetaminophen] Rash     Nose bleed       I have reviewed and updated the patient's chief complaint, history of present illness, review of systems, past medical history, surgical history, family history, social history, medications and allergy list as appropriate.     Objective      Vital Signs:   Vitals:    03/24/25 0853   BP: 126/80   Weight: 91.4 kg (201 lb 9.6 oz)   Height: 177.8 cm (70\")     Body mass index is 28.93 kg/m².  BMI is >= 25 and <30. (Overweight) The following options were offered after discussion;: exercise counseling/recommendations and nutrition counseling/recommendations     Patient reports that he is a former smoker. He quit smoking in 2003.  He has not resumed smoking since that time.  This behavior was applauded and she was encouraged to continue in smoking cessation.  We will continue to monitor at subsequent visits.    Ortho Exam:   Gait and Station: Appearance: ambulating with no assistive devices and limp.   Cardiovascular System: Arterial Pulses Right: dorsalis pedis pulse normal and posterior tibialis pulse normal. Arterial Pulses Left: dorsalis pedis pulse normal and posterior tibialis pulse normal. Edema Right: none. Edema Left: none.   Lumbar Spine: Inspection: normal alignment. Bony Palpation: no tenderness of the spinous process, the transverse process, the paraspinals, or the coccyx and tenderness of the sacrum. Special Tests: seated straight leg raising test positive.   Hip/Pelvis Appearance: Inspection: normal axial alignment.   Hips: Bony Palpation Right: no tenderness of the iliac crest, the ASIS, the PSIS, the pubic tubercle, the sciatic notch, the ischial tuberosity, the SI joint, or the greater trochanter. Soft Tissue Palpation Right: no tenderness of the hip flexor muscles, the hip adductor muscles, the " biceps femoris muscle, the semimembranous muscle, or the semitendinous muscle and tenderness of the piriformis. Active Range of Motion Right: normal, flexion normal, extension normal, internal rotation normal, and external rotation normal. Passive Range of Motion Right: no flexion contracture, hamstring tightness popliteal angle, or pain with motion and normal, flexion normal, extension normal, internal rotation normal, and external rotation normal. Special Tests Right: Wyatt test positive; negative FADIR, axial load. Strength Right: normal 5/5.   Skin: Right Lower Extremity: normal.    Results Review:   Imaging Results (Last 24 Hours)       ** No results found for the last 24 hours. **            Procedures    Assessment / Plan      Assessment/Plan:   Diagnoses and all orders for this visit:    1. Trochanteric bursitis of right hip (Primary)  -     Ambulatory Referral to Physical Therapy for Evaluation & Treatment    2. Tendinitis of right rotator cuff    3. Traumatic incomplete tear of left rotator cuff, initial encounter    4. Primary osteoarthritis of right hip  -     Ambulatory Referral to Physical Therapy for Evaluation & Treatment    5. Lumbar radiculopathy  -     Ambulatory Referral to Physical Therapy for Evaluation & Treatment    Plan:  Making improvement in the right hip though still having pain in the lateral hip down the right leg and into the knee and calf.  This could be coming from the lumbar spine.  Will rule out hip pathology however.  Recommend MRI right hip for further evaluation given previous workup with therapy, injections for trochanteric bursitis and arthritis.  New note provided for physical therapy to target both the hip as well as the lumbar spine given that this could be radicular symptoms.  Continued improvement in both the right and left shoulder does have some pain at the extremes of motion.  Follow-up after MRI to discuss results.    Follow Up:   No follow-ups on file.      Mann  MD Sadie  [facility]

## 2025-03-27 ENCOUNTER — TREATMENT (OUTPATIENT)
Dept: PHYSICAL THERAPY | Facility: CLINIC | Age: 65
End: 2025-03-27
Payer: OTHER MISCELLANEOUS

## 2025-03-27 DIAGNOSIS — G89.29 CHRONIC RIGHT-SIDED LOW BACK PAIN, UNSPECIFIED WHETHER SCIATICA PRESENT: ICD-10-CM

## 2025-03-27 DIAGNOSIS — M25.551 PAIN OF RIGHT HIP: Primary | ICD-10-CM

## 2025-03-27 DIAGNOSIS — M54.50 CHRONIC RIGHT-SIDED LOW BACK PAIN, UNSPECIFIED WHETHER SCIATICA PRESENT: ICD-10-CM
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
## 2025-03-31 ENCOUNTER — TELEPHONE (OUTPATIENT)
Dept: ORTHOPEDIC SURGERY | Facility: CLINIC | Age: 65
End: 2025-03-31
Payer: COMMERCIAL

## 2025-03-31 ENCOUNTER — TELEPHONE (OUTPATIENT)
Dept: PHYSICAL THERAPY | Facility: CLINIC | Age: 65
End: 2025-03-31

## 2025-03-31 NOTE — TELEPHONE ENCOUNTER
Caller: Guy Sheth    Relationship: Self         What was the call regarding: JUST SAW DR HAS RESPATORY INFECTION

## 2025-03-31 NOTE — TELEPHONE ENCOUNTER
Caller: RIANNA    Relationship: [employer]    Best call back number: [phone]    What form or medical record are you requesting: ORDERS  RIANNA STATES PATIENT HAS 2 CLAIMS  DOI 06/06/2024 LEFT SHOULDER, LEFT KNEE  DOI 01/13/2025 RT SHOULDER, RT HIP, RT KNEE    Who is requesting this form or medical record from you: DAGOBERTO PRESCOTT     How would you like to receive the form or medical records (pick-up, mail, fax): FAX  If fax, what is the fax number: [phone]    Timeframe paperwork needed: ASAP     Additional notes: RIANNA REQUEST ORDERS FOR DOI 06/06/2024 AND ORDERS FOR DOI 01/13/2025 BE FAXED SEPARATELY

## 2025-04-03 ENCOUNTER — TREATMENT (OUTPATIENT)
Dept: PHYSICAL THERAPY | Facility: CLINIC | Age: 65
End: 2025-04-03
Payer: OTHER MISCELLANEOUS

## 2025-04-03 DIAGNOSIS — G89.29 CHRONIC RIGHT-SIDED LOW BACK PAIN, UNSPECIFIED WHETHER SCIATICA PRESENT: ICD-10-CM

## 2025-04-03 DIAGNOSIS — M25.551 PAIN OF RIGHT HIP: Primary | ICD-10-CM

## 2025-04-03 DIAGNOSIS — M54.50 CHRONIC RIGHT-SIDED LOW BACK PAIN, UNSPECIFIED WHETHER SCIATICA PRESENT: ICD-10-CM

## 2025-04-03 PROCEDURE — 97110 THERAPEUTIC EXERCISES: CPT | Performed by: PHYSICAL THERAPIST

## 2025-04-03 PROCEDURE — 97112 NEUROMUSCULAR REEDUCATION: CPT | Performed by: PHYSICAL THERAPIST

## 2025-04-03 NOTE — PROGRESS NOTES
Physical Therapy Daily Progress Note    [facility]  [address]  [place] [address]      Patient: Guy Sheth   : 1960  Diagnosis/ICD-10 Code:  Pain of right hip [M25.551]  Referring practitioner: Lee Noel MD  Date of Initial Visit: Type: THERAPY  Noted: 3/27/2025  Today's Date: 4/3/2025  Patient seen for 2 sessions           Subjective   Patient reports no change in R LE symptoms but he also has not completed HEP due to severe upper respiratory illness/cough, which is improving.    Objective   See Exercise, Manual, and Modality Logs for complete treatment.       Assessment/Plan  Introduced standing R hip strengthening without pain, though avoided R LE SLS due to history of peripheralization during these activities. Patient reported general fatigue fairly quickly.            Timed:  Manual Therapy:    0     mins  43848;  Therapeutic Exercise:    23     mins  48979;     Neuromuscular Arturo:   15    mins  77589;    Therapeutic Activity:     0     mins  86905;     Gait Trainin     mins  58575;     Ultrasound:     0     mins  05617;    Self Care Trainin     mins  44522;    Untimed:  Electrical Stimulation:    0     mins  07156 ( );  Mechanical Traction:    0     mins  56947;   Dry Needlin     mins   Re-evaluation                0      mins  35310;  Group Therapy              0      mins  15048;    Timed Treatment:   38   mins   Total Treatment:     38   mins    Alfonso Ricketts PT  Physical Therapist

## 2025-04-07 ENCOUNTER — TREATMENT (OUTPATIENT)
Dept: PHYSICAL THERAPY | Facility: CLINIC | Age: 65
End: 2025-04-07
Payer: OTHER MISCELLANEOUS

## 2025-04-07 DIAGNOSIS — M54.50 CHRONIC RIGHT-SIDED LOW BACK PAIN, UNSPECIFIED WHETHER SCIATICA PRESENT: ICD-10-CM

## 2025-04-07 DIAGNOSIS — G89.29 CHRONIC RIGHT-SIDED LOW BACK PAIN, UNSPECIFIED WHETHER SCIATICA PRESENT: ICD-10-CM

## 2025-04-07 DIAGNOSIS — M25.551 PAIN OF RIGHT HIP: Primary | ICD-10-CM

## 2025-04-07 PROCEDURE — 97112 NEUROMUSCULAR REEDUCATION: CPT | Performed by: PHYSICAL THERAPIST

## 2025-04-07 PROCEDURE — 97110 THERAPEUTIC EXERCISES: CPT | Performed by: PHYSICAL THERAPIST

## 2025-04-07 PROCEDURE — 97140 MANUAL THERAPY 1/> REGIONS: CPT | Performed by: PHYSICAL THERAPIST

## 2025-04-07 NOTE — PROGRESS NOTES
Physical Therapy Daily Progress Note    [facility]  [address]  [place] [address]      Patient: Guy Sheth   : 1960  Diagnosis/ICD-10 Code:  Pain of right hip [M25.551]  Referring practitioner: Lee Noel MD  Date of Initial Visit: Type: THERAPY  Noted: 3/27/2025  Today's Date: 2025  Patient seen for 3 sessions           Subjective   Patient reports little overall change but R LE numbness/tingling distal to his R knee is slightly improving.    Objective   See Exercise, Manual, and Modality Logs for complete treatment.       Assessment/Plan  R LE axial traction relieves c/o R LE tingling and hip pain, while standing/walking/supine with R LE extended provokes these symptoms.     Trialed weight bearing exercise with continued peripheralization of symptoms reported.        Timed:  Manual Therapy:    8     mins  06966;  Therapeutic Exercise:    25     mins  34481;     Neuromuscular Arturo:   12    mins  31116;    Therapeutic Activity:     0     mins  60804;     Gait Trainin     mins  82884;     Ultrasound:     0     mins  26905;    Self Care Trainin     mins  65916;    Untimed:  Electrical Stimulation:    0     mins  38732 ( );  Mechanical Traction:    0     mins  27637;   Dry Needlin     mins   Re-evaluation                0      mins  94218;  Group Therapy              0      mins  53730;    Timed Treatment:   45   mins   Total Treatment:     45   mins    Alfonso Ricketts PT  Physical Therapist   · Continue Lipitor 10 mg HS

## 2025-04-11 ENCOUNTER — TELEPHONE (OUTPATIENT)
Dept: PHYSICAL THERAPY | Facility: CLINIC | Age: 65
End: 2025-04-11

## 2025-04-14 ENCOUNTER — TREATMENT (OUTPATIENT)
Dept: PHYSICAL THERAPY | Facility: CLINIC | Age: 65
End: 2025-04-14
Payer: OTHER MISCELLANEOUS

## 2025-04-14 DIAGNOSIS — M54.50 CHRONIC RIGHT-SIDED LOW BACK PAIN, UNSPECIFIED WHETHER SCIATICA PRESENT: ICD-10-CM

## 2025-04-14 DIAGNOSIS — M25.551 PAIN OF RIGHT HIP: Primary | ICD-10-CM

## 2025-04-14 DIAGNOSIS — G89.29 CHRONIC RIGHT-SIDED LOW BACK PAIN, UNSPECIFIED WHETHER SCIATICA PRESENT: ICD-10-CM

## 2025-04-14 PROCEDURE — 97530 THERAPEUTIC ACTIVITIES: CPT | Performed by: PHYSICAL THERAPIST

## 2025-04-14 PROCEDURE — 97112 NEUROMUSCULAR REEDUCATION: CPT | Performed by: PHYSICAL THERAPIST

## 2025-04-14 PROCEDURE — 97110 THERAPEUTIC EXERCISES: CPT | Performed by: PHYSICAL THERAPIST

## 2025-04-14 NOTE — PROGRESS NOTES
Physical Therapy Daily Progress Note    [facility]  [address]  [place] [address]      Patient: Guy Sheth   : 1960  Diagnosis/ICD-10 Code:  Pain of right hip [M25.551]  Referring practitioner: Lee Noel MD  Date of Initial Visit: Type: THERAPY  Noted: 3/27/2025  Today's Date: 2025  Patient seen for 4 sessions           Subjective   Patient reports R hip and shoulder have felt aggravated for the past few days, otherwise no change in symptoms.    Objective   See Exercise, Manual, and Modality Logs for complete treatment.       Assessment/Plan  Re-introduced lumbopelvic stability in supine with improved tolerance. Added PPT seemed to improve comfort and prevent R LE symptom onset, which is consistent with his normal presentation.            Timed:  Manual Therapy:    0     mins  24882;  Therapeutic Exercise:    25     mins  76340;     Neuromuscular Arturo:   15    mins  28433;    Therapeutic Activity:     10     mins  29303;     Gait Trainin     mins  04364;     Ultrasound:     0     mins  13988;    Self Care Trainin     mins  09306;    Untimed:  Electrical Stimulation:    0     mins  19837 ( );  Mechanical Traction:    0     mins  63040;   Dry Needlin     mins   Re-evaluation                0      mins  08722;  Group Therapy              0      mins  54019;    Timed Treatment:   50   mins   Total Treatment:     50   mins    Alfonso Ricketts PT  Physical Therapist

## 2025-04-17 ENCOUNTER — TREATMENT (OUTPATIENT)
Dept: PHYSICAL THERAPY | Facility: CLINIC | Age: 65
End: 2025-04-17
Payer: OTHER MISCELLANEOUS

## 2025-04-17 DIAGNOSIS — M25.551 PAIN OF RIGHT HIP: Primary | ICD-10-CM

## 2025-04-17 DIAGNOSIS — G89.29 CHRONIC RIGHT-SIDED LOW BACK PAIN, UNSPECIFIED WHETHER SCIATICA PRESENT: ICD-10-CM

## 2025-04-17 DIAGNOSIS — M54.50 CHRONIC RIGHT-SIDED LOW BACK PAIN, UNSPECIFIED WHETHER SCIATICA PRESENT: ICD-10-CM

## 2025-04-17 PROCEDURE — 97110 THERAPEUTIC EXERCISES: CPT | Performed by: PHYSICAL THERAPIST

## 2025-04-17 PROCEDURE — 97530 THERAPEUTIC ACTIVITIES: CPT | Performed by: PHYSICAL THERAPIST

## 2025-04-17 PROCEDURE — 97112 NEUROMUSCULAR REEDUCATION: CPT | Performed by: PHYSICAL THERAPIST

## 2025-04-17 NOTE — PROGRESS NOTES
Physical Therapy Daily Progress Note    [facility]  [address]  [place] [address]      Patient: Guy Sheth   : 1960  Diagnosis/ICD-10 Code:  Pain of right hip [M25.551]  Referring practitioner: Lee Noel MD  Date of Initial Visit: Type: THERAPY  Noted: 3/27/2025  Today's Date: 2025  Patient seen for 5 sessions           Subjective   Patient reports increased R LE pain for a few hours after last visit but it returned to baseline soon after. He has felt dizzy with transfers today which has occurred before. He reports tingling in R LE has started traveling further distal, now to his R ankle, for the past few days.    Objective   See Exercise, Manual, and Modality Logs for complete treatment.       Assessment/Plan  Education provided on possible causes/treatments of positional vertigo.    Re-introduced CKC strengthening of his R LE in SLS at low volume without c/o increased R LE peripheral symptoms. Patient reported dizziness with lying down so most of these exercises were held.        Timed:  Manual Therapy:    0     mins  06054;  Therapeutic Exercise:    10     mins  69062;     Neuromuscular Arturo:   23    mins  13543;    Therapeutic Activity:     15     mins  87349;     Gait Trainin     mins  37129;     Ultrasound:     0     mins  18425;    Self Care Trainin     mins  48147;    Untimed:  Electrical Stimulation:    0     mins  99535 ( );  Mechanical Traction:    0     mins  86414;   Dry Needlin     mins   Re-evaluation                0      mins  22649;  Group Therapy              0      mins  78011;    Timed Treatment:   48   mins   Total Treatment:     48   mins    Alfonso Ricketts PT  Physical Therapist

## 2025-04-21 ENCOUNTER — TREATMENT (OUTPATIENT)
Dept: PHYSICAL THERAPY | Facility: CLINIC | Age: 65
End: 2025-04-21
Payer: OTHER MISCELLANEOUS

## 2025-04-21 DIAGNOSIS — M25.551 PAIN OF RIGHT HIP: Primary | ICD-10-CM

## 2025-04-21 DIAGNOSIS — G89.29 CHRONIC RIGHT-SIDED LOW BACK PAIN, UNSPECIFIED WHETHER SCIATICA PRESENT: ICD-10-CM

## 2025-04-21 DIAGNOSIS — M54.50 CHRONIC RIGHT-SIDED LOW BACK PAIN, UNSPECIFIED WHETHER SCIATICA PRESENT: ICD-10-CM

## 2025-04-21 PROCEDURE — 97530 THERAPEUTIC ACTIVITIES: CPT | Performed by: PHYSICAL THERAPIST

## 2025-04-21 PROCEDURE — 97110 THERAPEUTIC EXERCISES: CPT | Performed by: PHYSICAL THERAPIST

## 2025-04-21 PROCEDURE — 97112 NEUROMUSCULAR REEDUCATION: CPT | Performed by: PHYSICAL THERAPIST

## 2025-04-21 NOTE — PROGRESS NOTES
Physical Therapy Daily Progress Note    [facility]  [address]  [place] [address]      Patient: Guy Sheth   : 1960  Diagnosis/ICD-10 Code:  Pain of right hip [M25.551]  Referring practitioner: Lee Noel MD  Date of Initial Visit: Type: THERAPY  Noted: 3/27/2025  Today's Date: 2025  Patient seen for 6 sessions           Subjective   Patient reports R LE symptoms are getting better, with reduced intensity, though tingling continues to extend to his R ankle.    Objective   See Exercise, Manual, and Modality Logs for complete treatment.       Assessment/Plan  Advanced CKC R hip strengthening with increased RLE symptoms only reported toward the end of increased repetition.             Timed:  Manual Therapy:    0     mins  17438;  Therapeutic Exercise:    23     mins  85249;     Neuromuscular Arturo:   12    mins  35395;    Therapeutic Activity:     10     mins  64537;     Gait Trainin     mins  23176;     Ultrasound:     0     mins  00855;    Self Care Trainin     mins  16384;    Untimed:  Electrical Stimulation:    0     mins  48250 ( );  Mechanical Traction:    0     mins  01291;   Dry Needlin     mins   Re-evaluation                0      mins  61532;  Group Therapy              0      mins  33819;    Timed Treatment:   45   mins   Total Treatment:     45   mins    Alfonso Ricketts PT  Physical Therapist

## 2025-04-24 ENCOUNTER — TREATMENT (OUTPATIENT)
Dept: PHYSICAL THERAPY | Facility: CLINIC | Age: 65
End: 2025-04-24
Payer: OTHER MISCELLANEOUS

## 2025-04-24 DIAGNOSIS — M54.50 CHRONIC RIGHT-SIDED LOW BACK PAIN, UNSPECIFIED WHETHER SCIATICA PRESENT: ICD-10-CM

## 2025-04-24 DIAGNOSIS — G89.29 CHRONIC RIGHT-SIDED LOW BACK PAIN, UNSPECIFIED WHETHER SCIATICA PRESENT: ICD-10-CM

## 2025-04-24 DIAGNOSIS — M25.551 PAIN OF RIGHT HIP: Primary | ICD-10-CM

## 2025-04-24 PROCEDURE — 97530 THERAPEUTIC ACTIVITIES: CPT | Performed by: PHYSICAL THERAPIST

## 2025-04-24 PROCEDURE — 97164 PT RE-EVAL EST PLAN CARE: CPT | Performed by: PHYSICAL THERAPIST

## 2025-04-24 PROCEDURE — 97112 NEUROMUSCULAR REEDUCATION: CPT | Performed by: PHYSICAL THERAPIST

## 2025-04-24 PROCEDURE — 97110 THERAPEUTIC EXERCISES: CPT | Performed by: PHYSICAL THERAPIST

## 2025-04-24 NOTE — PROGRESS NOTES
Re-Assessment / Re-Certification      [facility]  [address]  [place] [address]    Patient: Guy Sheth   : 1960  Diagnosis/ICD-10 Code:  Pain of right hip [M25.551]  Referring practitioner: Lee Noel MD  Date of Initial Visit: Type: THERAPY  Noted: 3/27/2025  Today's Date: 2025  Patient seen for 7 sessions      Subjective:     Subjective Questionnaire: LEFS: 41/80  Clinical Progress: improved  Home Program Compliance: Yes  Treatment has included: therapeutic exercise, neuromuscular re-education, manual therapy, and therapeutic activity    Subjective Evaluation    History of Present Illness  Date of onset: 2025  Mechanism of injury: CLOF: standing/walking limited to 30 min before needing to lean forward for partial R LE symptom relief    Subjective comment: Patient reports self limiting walking but his capacity/tolerance is improving. RLE continues to hurt when he first lies down at night. He reports R axillary numbness as a new symptom.  Patient Occupation: , works 2nd shift   Precautions and Work Restrictions: nonePain  Current pain ratin  At best pain ratin  At worst pain rating: 3       Objective          Neurological Testing     Sensation     Lumbar   Left   Intact: light touch    Comments   Right light touch: decreased L4    Additional Neurological Details  (+) on R    Active Range of Motion   Left Knee   Flexion: WFL and with pain  Extension: WFL    Right Knee   Flexion: WFL  Extension: WFL    Additional Active Range of Motion Details  Flexion: touches mid shin, posterior thigh tension  Extension: 25%, R low back pain    Passive Range of Motion   Left Hip   Flexion: Left hip passive flexion: 110 deg, R hip/low back pain.   External rotation (90/90): WFL  Internal rotation (90/90): WFL    Right Hip   Flexion: 100 (120 deg, R groin pain) degrees with pain  Extension: 0 (significant R low back pain radiating to distal of R  knee) degrees   External rotation (90/90): 45 degrees with pain  Internal rotation (90/90): 20 degrees with pain  Left Knee   Flexion: WFL and with pain  Extension: WFL    Right Knee   Flexion: WFL  Extension: WFL    Strength/Myotome Testing     Left Hip   Planes of Motion   Flexion: 4+    Right Hip   Planes of Motion   Flexion: 4- (back pain)  Abduction: 4- (pain)    Left Knee   Flexion: 5  Extension: 5  Quadriceps contraction: good    Right Knee   Flexion: 4+ (back pain)  Extension: 5    Left Ankle/Foot   Dorsiflexion: 5  Plantar flexion: 5    Right Ankle/Foot   Dorsiflexion: 5  Plantar flexion: 5    Tests     Right Hip   Positive FADIR.       Assessment & Plan       Assessment  Impairments: abnormal coordination, abnormal muscle firing, abnormal or restricted ROM, activity intolerance, impaired physical strength, lacks appropriate home exercise program and pain with function   Functional limitations: carrying objects, lifting, sleeping, walking, uncomfortable because of pain, moving in bed and standing   Assessment details: Patient is a 65-year-old male who presents with right hip pain radiating to his distal knee, with distal numbness/tingling, after slip and fall injury on 1/13.  RLE symptoms are exacerbated by prolonged standing/walking and alleviated with sitting/forward bending.  Right slump test is positive.  He demonstrates weakness with right hip flexion, right knee extension, and right ankle dorsiflexion.  Right L4 sensation is diminished.  Increased pain reported with right hip PROM flexion and extension.    4/24- Patient reporting functional and symptomatic improvements related to his low back and right lower extremity.  Walking tolerance is improved from 10 minutes to greater than 30 minutes.  Right lower extremity strength continues to improve, though right hip PROM was decreased and more painful today.  He continues to report symptoms radiating below his knee and into his ankle, mostly  tingling.  Barriers to therapy: Chronicity, comorbidities  Prognosis: good    Goals  Plan Goals: Short Term Goals (4 weeks)  1. Patient to be compliant with initial HEP.  2.  Patient to report walking >20 minutes without increased pain.  3.  Patient to improve LEFS to >40/80.    Long Term Goals (8 weeks)  1. Patient to report walking >40 minutes without increased pain.  2. Patient to improve LEFS to >46/80.  3. Patient to demonstrate independence with home exercises.      Plan  Therapy options: will be seen for skilled therapy services  Planned modality interventions: cryotherapy, TENS, thermotherapy (hydrocollator packs), dry needling, traction, iontophoresis and ultrasound  Planned therapy interventions: abdominal trunk stabilization, body mechanics training, functional ROM exercises, home exercise program, manual therapy, motor coordination training, neuromuscular re-education, soft tissue mobilization, strengthening, therapeutic activities, ADL retraining, postural training, spinal/joint mobilization, stretching, joint mobilization, gait training and balance/weight-bearing training  Frequency: 2x week  Duration in weeks: 4  Treatment plan discussed with: patient      Progress toward previous goals: Partially Met        Timeframe: 1 month    PT Signature: Alfonso Ricketts, PT  PT License [id number]    Based upon review of the patient's progress and continued therapy plan, it is my medical opinion that Guy Sheth should continue physical therapy treatment at [facility]  [address] [place] [id number] [phone].    Signature: __________________________________  Lee Noel MD    Timed:  Manual Therapy:    0     mins  80022;  Therapeutic Exercise:    10     mins  10063;     Neuromuscular Arturo:    10    mins  32282;    Therapeutic Activity:     14     mins  08531;     Gait Trainin     mins  17470;     Ultrasound:     0     mins  48864;     Self Care Trainin     mins  08172;    Untimed:  Electrical Stimulation:    0     mins  63962 ( );  Mechanical Traction:    0     mins  11639;   Dry Needlin     mins   Re-evaluation                15      mins  31960;  Group Therapy              0      mins  21041;    Timed Treatment:   34   mins   Total Treatment:     49   mins

## 2025-04-28 ENCOUNTER — TREATMENT (OUTPATIENT)
Dept: PHYSICAL THERAPY | Facility: CLINIC | Age: 65
End: 2025-04-28
Payer: OTHER MISCELLANEOUS

## 2025-04-28 DIAGNOSIS — G89.29 CHRONIC RIGHT-SIDED LOW BACK PAIN, UNSPECIFIED WHETHER SCIATICA PRESENT: ICD-10-CM

## 2025-04-28 DIAGNOSIS — M54.50 CHRONIC RIGHT-SIDED LOW BACK PAIN, UNSPECIFIED WHETHER SCIATICA PRESENT: ICD-10-CM

## 2025-04-28 DIAGNOSIS — M25.551 PAIN OF RIGHT HIP: Primary | ICD-10-CM

## 2025-04-28 PROCEDURE — 97110 THERAPEUTIC EXERCISES: CPT | Performed by: PHYSICAL THERAPIST

## 2025-04-28 PROCEDURE — 97112 NEUROMUSCULAR REEDUCATION: CPT | Performed by: PHYSICAL THERAPIST

## 2025-04-28 PROCEDURE — 97530 THERAPEUTIC ACTIVITIES: CPT | Performed by: PHYSICAL THERAPIST

## 2025-04-28 NOTE — PROGRESS NOTES
Physical Therapy Daily Progress Note    [facility]  [address]  [place] [address]      Patient: Guy Sheth   : 1960  Diagnosis/ICD-10 Code:  Pain of right hip [M25.551]  Referring practitioner: Lee Noel MD  Date of Initial Visit: Type: THERAPY  Noted: 3/27/2025  Today's Date: 2025  Patient seen for 8 sessions           Subjective   Patient reports doing a lot of yardwork over the weekend, requiring multiple sitting rest breaks.    Objective   See Exercise, Manual, and Modality Logs for complete treatment.       Assessment/Plan  Patient demonstrating gradual increase in R LE standing tolerance, especially in SLS, with peripheral symptoms taking longer to begin and worsen. Hips continue to fatigue with current program. When utilizing PPT, he is able to complete most activities without increased pain.            Timed:  Manual Therapy:    0     mins  02720;  Therapeutic Exercise:    13     mins  28469;     Neuromuscular Arturo:   23    mins  07199;    Therapeutic Activity:     8     mins  31089;     Gait Trainin     mins  99661;     Ultrasound:     0     mins  88109;    Self Care Trainin     mins  10359;    Untimed:  Electrical Stimulation:    0     mins  54764 ( );  Mechanical Traction:    0     mins  39312;   Dry Needlin     mins   Re-evaluation                0      mins  42447;  Group Therapy              0      mins  42161;    Timed Treatment:   44   mins   Total Treatment:     44   mins    Alfonso Ricketts PT  Physical Therapist  
Moderate respiratory  distress  General: No pallor, No icterus, afebrile  HEENT: No JVD, no Bruit.  Heart: S1+S2 audible  Lungs: bilateral  reduced air entry, no wheezing, bilateral  crepitations.  Abdomen: Soft, non-tender, non-distended , no  rigidity or guarding.  CNS: Grossly intact, sensations intact.  Extremities:  mild bilateral pitting

## 2025-05-01 ENCOUNTER — TELEPHONE (OUTPATIENT)
Dept: PHYSICAL THERAPY | Facility: CLINIC | Age: 65
End: 2025-05-01

## 2025-05-05 ENCOUNTER — TREATMENT (OUTPATIENT)
Dept: PHYSICAL THERAPY | Facility: CLINIC | Age: 65
End: 2025-05-05
Payer: OTHER MISCELLANEOUS

## 2025-05-05 ENCOUNTER — OFFICE VISIT (OUTPATIENT)
Age: 65
End: 2025-05-05
Payer: OTHER MISCELLANEOUS

## 2025-05-05 VITALS
DIASTOLIC BLOOD PRESSURE: 70 MMHG | BODY MASS INDEX: 29.79 KG/M2 | SYSTOLIC BLOOD PRESSURE: 120 MMHG | WEIGHT: 208.1 LBS | HEIGHT: 70 IN

## 2025-05-05 DIAGNOSIS — M67.922 TENDINOPATHY OF LEFT BICEPS TENDON: Primary | ICD-10-CM

## 2025-05-05 DIAGNOSIS — M25.551 PAIN OF RIGHT HIP: Primary | ICD-10-CM

## 2025-05-05 DIAGNOSIS — M19.012 ARTHRITIS OF LEFT GLENOHUMERAL JOINT: ICD-10-CM

## 2025-05-05 DIAGNOSIS — G89.29 CHRONIC RIGHT-SIDED LOW BACK PAIN, UNSPECIFIED WHETHER SCIATICA PRESENT: ICD-10-CM

## 2025-05-05 DIAGNOSIS — M54.50 CHRONIC RIGHT-SIDED LOW BACK PAIN, UNSPECIFIED WHETHER SCIATICA PRESENT: ICD-10-CM

## 2025-05-05 PROCEDURE — 97530 THERAPEUTIC ACTIVITIES: CPT | Performed by: PHYSICAL THERAPIST

## 2025-05-05 PROCEDURE — 97110 THERAPEUTIC EXERCISES: CPT | Performed by: PHYSICAL THERAPIST

## 2025-05-05 PROCEDURE — 76942 ECHO GUIDE FOR BIOPSY: CPT | Performed by: STUDENT IN AN ORGANIZED HEALTH CARE EDUCATION/TRAINING PROGRAM

## 2025-05-05 PROCEDURE — 20550 NJX 1 TENDON SHEATH/LIGAMENT: CPT | Performed by: STUDENT IN AN ORGANIZED HEALTH CARE EDUCATION/TRAINING PROGRAM

## 2025-05-05 PROCEDURE — 99213 OFFICE O/P EST LOW 20 MIN: CPT | Performed by: STUDENT IN AN ORGANIZED HEALTH CARE EDUCATION/TRAINING PROGRAM

## 2025-05-05 PROCEDURE — 97112 NEUROMUSCULAR REEDUCATION: CPT | Performed by: PHYSICAL THERAPIST

## 2025-05-05 RX ORDER — LIDOCAINE HYDROCHLORIDE 10 MG/ML
1 INJECTION, SOLUTION EPIDURAL; INFILTRATION; INTRACAUDAL; PERINEURAL
Status: COMPLETED | OUTPATIENT
Start: 2025-05-05 | End: 2025-05-05

## 2025-05-05 RX ORDER — TRIAMCINOLONE ACETONIDE 40 MG/ML
40 INJECTION, SUSPENSION INTRA-ARTICULAR; INTRAMUSCULAR
Status: COMPLETED | OUTPATIENT
Start: 2025-05-05 | End: 2025-05-05

## 2025-05-05 RX ADMIN — TRIAMCINOLONE ACETONIDE 40 MG: 40 INJECTION, SUSPENSION INTRA-ARTICULAR; INTRAMUSCULAR at 11:41

## 2025-05-05 RX ADMIN — LIDOCAINE HYDROCHLORIDE 1 ML: 10 INJECTION, SOLUTION EPIDURAL; INFILTRATION; INTRACAUDAL; PERINEURAL at 11:41

## 2025-05-05 NOTE — PROGRESS NOTES
[facility] Office Follow Up Visit     Office Follow Up      Date: 05/05/2025   Patient Name: Guy Sheth  MRN: [id number]  YOB: 1960    Referring Physician: Michael Mcintosh DO     Chief Complaint:   Chief Complaint   Patient presents with   • Follow-up     5 week recheck Traumatic incomplete tear of left rotator cuff     History of Present Illness: Guy Sheth is a 65 y.o. male who returns to clinic today for follow up on left shoulder pain. His pain is a 6 /10 on the pain scale. Patient has tried the following previous treatments physical therapy  and injections: Cortisone/Visco. He mentions current symptoms of same as prior , pain , and stiffness. He states that these treatments have worsened.    Subjective     Review of Systems: Review of Systems   Constitutional: Negative.  Negative for chills, fatigue and fever.   HENT: Negative.  Negative for congestion and dental problem.    Eyes: Negative.  Negative for blurred vision.   Respiratory: Negative.  Negative for shortness of breath.    Cardiovascular: Negative.  Negative for leg swelling.   Gastrointestinal: Negative.  Negative for abdominal pain.   Endocrine: Negative.  Negative for polyuria.   Genitourinary: Negative.  Negative for difficulty urinating.   Musculoskeletal:  Positive for arthralgias.   Skin: Negative.    Allergic/Immunologic: Negative.    Neurological: Negative.    Hematological: Negative.  Negative for adenopathy.   Psychiatric/Behavioral: Negative.  Negative for behavioral problems.         Medications:   Current Outpatient Medications:   •  aspirin  MG tablet, Take 1 tablet by mouth Daily., Disp: , Rfl:   •  bisoprolol (ZEBeta) 10 MG tablet, Take 1 tablet by mouth Daily., Disp: 90 tablet, Rfl: 3  •  Breyna 80-4.5 MCG/ACT inhaler, , Disp: , Rfl:   •  cetirizine (zyrTEC) 10 MG tablet, Take 1 tablet by mouth Daily., Disp: , Rfl:   •  cloNIDine (Catapres) 0.1 MG  tablet, Take 1 tablet by mouth Every 8 (Eight) Hours As Needed for High Blood Pressure. SBP>150 MM/HG & OR DBP> 95 MM/HG, Disp: 90 tablet, Rfl: 3  •  Crestor 20 MG tablet, Take 1 tablet by mouth Daily., Disp: 30 tablet, Rfl: 4  •  cyclobenzaprine (FLEXERIL) 10 MG tablet, Take 1 tablet by mouth At Night As Needed for Muscle Spasms., Disp: 30 tablet, Rfl: 0  •  DULoxetine (CYMBALTA) 60 MG capsule, Take 1 capsule by mouth Daily., Disp: , Rfl:   •  famotidine (PEPCID) 20 MG tablet, , Disp: , Rfl:   •  Farxiga 10 MG tablet, , Disp: , Rfl:   •  glipizide (GLUCOTROL XL) 5 MG ER tablet, 1 tablet., Disp: , Rfl:   •  Kerendia 10 MG tablet, Take 1 tablet by mouth Daily., Disp: , Rfl:   •  linaclotide (LINZESS) 290 MCG capsule capsule, Every Morning, Disp: , Rfl:   •  metaxalone (SKELAXIN) 800 MG tablet, TAKE 1 TABLET BY MOUTH THREE TIMES DAILY AS NEEDED FOR MUSCLE PAIN, Disp: , Rfl:   •  metFORMIN (GLUCOPHAGE) 500 MG tablet, 1 tablet., Disp: , Rfl:   •  Olmesartan-amLODIPine-HCTZ 20-5-12.5 MG tablet, Take 1 tablet by mouth 2 (Two) Times a Day., Disp: 180 tablet, Rfl: 3  •  pantoprazole (PROTONIX) 40 MG EC tablet, Take 1 tablet by mouth Daily., Disp: , Rfl:   •  tamsulosin (FLOMAX) 0.4 MG capsule 24 hr capsule, , Disp: , Rfl:   •  temazepam (RESTORIL) 15 MG capsule, Take 1 capsule by mouth 2 (Two) Times a Day., Disp: , Rfl:   •  traZODone (DESYREL) 150 MG tablet, Take 1 tablet by mouth At Night As Needed for Sleep., Disp: , Rfl:     Allergies:   Allergies   Allergen Reactions   • Levaquin [Levofloxacin] Itching and GI Intolerance   • Crestor [Rosuvastatin] Myalgia     Generic    • Amoxicillin-Pot Clavulanate Hives and Itching   • Codeine    • Contrast Dye (Echo Or Unknown Ct/Mr)    • Hydrocodone Other (See Comments)   • Lopressor [Metoprolol Tartrate]    • Medrol [Methylprednisolone] Other (See Comments)     Blood in stool   • Morphine And Codeine    • Prednisone Other (See Comments)   • Sulfa Antibiotics    • Tirzepatide GI  "Intolerance   • Lortab [Hydrocodone-Acetaminophen] Rash     Nose bleed       I have reviewed and updated the patient's chief complaint, history of present illness, review of systems, past medical history, surgical history, family history, social history, medications and allergy list as appropriate.     Objective      Vital Signs:   Vitals:    05/05/25 1122   BP: 120/70   Weight: 94.4 kg (208 lb 1.6 oz)   Height: 177.8 cm (70\")     Body mass index is 29.86 kg/m².  BMI is >= 25 and <30. (Overweight) The following options were offered after discussion;: exercise counseling/recommendations and nutrition counseling/recommendations     Patient reports that he is a former smoker. He quit smoking in 2003.  He has not resumed smoking since that time.  This behavior was applauded and she was encouraged to continue in smoking cessation.  We will continue to monitor at subsequent visits.    Ortho Exam:  Cardiovascular System: Arterial Pulses Right: radial normal. Arterial Pulses Left: radial normal.   C-Spine/Neck: Active Range of Motion: no crepitus or pain elicited on motion and flexion normal, extension normal, rotation normal, and lateral flexion normal.   Shoulders: Inspection Right: no misalignment, erythema, induration, swelling, or warmth and AC prominence normal. Inspection Left: no misalignment, erythema, induration, swelling, or warmth and AC prominence normal. Bony Palpation Right: no tenderness of the clavicle, the acromioclavicular joint, the greater tuberosity, or the bicipital groove. Bony Palpation Left: tenderness of the clavicle lateral one-third, the acromioclavicular joint, the greater tuberosity, and the bicipital groove and no tenderness of the sternoclavicular joint. Soft Tissue Palpation Left: tenderness of the supraspinatus, the infraspinatus, the glenohumeral joint region, and the lateral cuff insertion. Active Range of Motion Left: limited. Special Tests Right: Hawkin's test positive and empty can " sign positive. Special Tests Left: Hawkin's test positive, Neer's test positive, Ogle's test positive, and empty can sign positive.   exam RIGHT shoulder: forward flexion approximately 140 degrees. Abduction 140 degrees. Internal rotation L4. Motor testing supraspinatus 5/5  exam LEFT shoulder: forward flexion approximately 110 degrees. Abduction 110 degrees. Internal rotation SI. Motor testing supraspinatus 4/5    Results Review:   Imaging Results (Last 24 Hours)       Procedure Component Value Units Date/Time    US Guided Injection [[id number]] Resulted: 05/05/25 1148     Updated: 05/05/25 1148            Procedures    Assessment / Plan      Assessment/Plan:   Diagnoses and all orders for this visit:    1. Tendinopathy of left biceps tendon (Primary)  -     US Guided Injection    2. Arthritis of left glenohumeral joint    Other orders  -     - Injection Tendon or Ligament    Plan:  Improvement in pain at the glenohumeral joint with corticosteroid injection on 2/24/2025.  Having more biceps muscle pain as well as proximal biceps tendinopathy.  Pain worsened with flexion of the elbow.  Overall range of motion improved.  I will inject the proximal biceps tendon sheath with corticosteroid today in the office.  Follow-up as needed.    Follow Up:   No follow-ups on file.      Mann Noel MD  [facility] [place] [facility]

## 2025-05-05 NOTE — PROGRESS NOTES
Physical Therapy Daily Treatment Note         [address] [place] [address]    Patient: Guy Sheth   : 1960  Diagnosis/ICD-10 Code:  Pain of right hip [M25.551]  Referring practitioner: Lee Noel MD  Date of Initial Visit: Type: THERAPY  Noted: 3/27/2025  Today's Date: 2025  Patient seen for 9 sessions         Guy Sheth reports: saw MD and had injection in shoulder today.         Objective     See Exercise, Manual, and Modality Logs for complete treatment.       Assessment/Plan  Reports fatigue at right lateral hip in 3rd set of standing left hip abduction. Able to standing upright throughout standing exercises. Tolerated session well.   Progress per Plan of Care           Manual Therapy:         mins  79436;  Therapeutic Exercise:    13     mins  87064;     Neuromuscular Arturo:    23    mins  51255;    Therapeutic Activity:     8     mins  31150;     Gait Training:           mins  94173;     Ultrasound:          mins  92947;    Electrical Stimulation:        mins  78447 (MC );  Dry Needling          mins self-pay    Timed Treatment:   44   mins   Total Treatment:     44   mins    Lucina Orellana PT  Physical Therapist

## 2025-05-05 NOTE — PROGRESS NOTES
Procedure   - Injection Tendon or Ligament    Date/Time: 5/5/2025 11:41 AM    Performed by: Lee Noel MD  Authorized by: Lee Noel MD      Preparation: Patient was prepped and draped in the usual sterile fashion.    Anesthesia:  Local anesthesia used: no    Sedation:  Patient sedated: no    Patient tolerance: patient tolerated the procedure well with no immediate complications  Medications administered: 40 mg triamcinolone acetonide 40 MG/ML; 1 mL lidocaine PF 1% 1 %  Ultrasound guidance: yes  Laterality: left  Needle Size: 25  Approach: anterolateral

## 2025-05-08 ENCOUNTER — TREATMENT (OUTPATIENT)
Dept: PHYSICAL THERAPY | Facility: CLINIC | Age: 65
End: 2025-05-08
Payer: OTHER MISCELLANEOUS

## 2025-05-08 DIAGNOSIS — M25.551 PAIN OF RIGHT HIP: Primary | ICD-10-CM

## 2025-05-08 DIAGNOSIS — M54.50 CHRONIC RIGHT-SIDED LOW BACK PAIN, UNSPECIFIED WHETHER SCIATICA PRESENT: ICD-10-CM

## 2025-05-08 DIAGNOSIS — G89.29 CHRONIC RIGHT-SIDED LOW BACK PAIN, UNSPECIFIED WHETHER SCIATICA PRESENT: ICD-10-CM

## 2025-05-08 PROCEDURE — 97112 NEUROMUSCULAR REEDUCATION: CPT | Performed by: PHYSICAL THERAPIST

## 2025-05-08 PROCEDURE — 97530 THERAPEUTIC ACTIVITIES: CPT | Performed by: PHYSICAL THERAPIST

## 2025-05-08 PROCEDURE — 97110 THERAPEUTIC EXERCISES: CPT | Performed by: PHYSICAL THERAPIST

## 2025-05-08 NOTE — PROGRESS NOTES
Physical Therapy Daily Progress Note    [facility]  [address]  [place] [address]      Patient: Guy Sheth   : 1960  Diagnosis/ICD-10 Code:  Pain of right hip [M25.551]  Referring practitioner: Lee Noel MD  Date of Initial Visit: Type: THERAPY  Noted: 3/27/2025  Today's Date: 2025  Patient seen for 10 sessions           Subjective   Patient reports standing/walking hasn't aggravated his R LE as much and R LE symptoms to the ankle are not as severe or occurring as often. He also received L shoulder injection earlier this week, which has helped some.    Objective   See Exercise, Manual, and Modality Logs for complete treatment.       Assessment/Plan  Patient demonstrated slight decline in standing tolerance compared to previous visits, reporting onset of right lower extremity symptoms faster during SLS activities.            Timed:  Manual Therapy:    0     mins  84479;  Therapeutic Exercise:    11     mins  05606;     Neuromuscular Arturo:   23    mins  87475;    Therapeutic Activity:     10     mins  24019;     Gait Trainin     mins  43899;     Ultrasound:     0     mins  18046;    Self Care Trainin     mins  40189;    Untimed:  Electrical Stimulation:    0     mins  09920 (MC );  Mechanical Traction:    0     mins  72641;   Dry Needlin     mins   Re-evaluation                0      mins  83750;  Group Therapy              0      mins  01861;    Timed Treatment:   44   mins   Total Treatment:     44   mins    Alfonso Ricketts PT  Physical Therapist

## 2025-05-12 ENCOUNTER — TREATMENT (OUTPATIENT)
Dept: PHYSICAL THERAPY | Facility: CLINIC | Age: 65
End: 2025-05-12
Payer: OTHER MISCELLANEOUS

## 2025-05-12 DIAGNOSIS — M25.551 PAIN OF RIGHT HIP: Primary | ICD-10-CM

## 2025-05-12 DIAGNOSIS — M54.50 CHRONIC RIGHT-SIDED LOW BACK PAIN, UNSPECIFIED WHETHER SCIATICA PRESENT: ICD-10-CM

## 2025-05-12 DIAGNOSIS — G89.29 CHRONIC RIGHT-SIDED LOW BACK PAIN, UNSPECIFIED WHETHER SCIATICA PRESENT: ICD-10-CM

## 2025-05-12 PROCEDURE — 97112 NEUROMUSCULAR REEDUCATION: CPT | Performed by: PHYSICAL THERAPIST

## 2025-05-12 PROCEDURE — 97530 THERAPEUTIC ACTIVITIES: CPT | Performed by: PHYSICAL THERAPIST

## 2025-05-12 PROCEDURE — 97110 THERAPEUTIC EXERCISES: CPT | Performed by: PHYSICAL THERAPIST

## 2025-05-12 NOTE — PROGRESS NOTES
Physical Therapy Daily Progress Note    [facility]  [address]  [place] [address]      Patient: Guy Sheth   : 1960  Diagnosis/ICD-10 Code:  Pain of right hip [M25.551]  Referring practitioner: Lee Noel MD  Date of Initial Visit: Type: THERAPY  Noted: 3/27/2025  Today's Date: 2025  Patient seen for 11 sessions           Subjective   Patient reports his worst R hip and low back pain occurs with rolling over in bed, but prolonged standing/walking will cause the same area to spasm/cramp and cause pain. Standing also causes R lateral calf tingling, but the intensity of this is improving.    Objective   See Exercise, Manual, and Modality Logs for complete treatment.       Assessment/Plan  Progressed CKC LE strengthening with increased band tension and reintroduction of leg press. He reported increased R hip pain with prolonged R LE stance but symptoms continue to resolve with sitting.            Timed:  Manual Therapy:    0     mins  35338;  Therapeutic Exercise:    25     mins  80365;     Neuromuscular Arturo:   15    mins  07889;    Therapeutic Activity:     15     mins  83211;     Gait Trainin     mins  60067;     Ultrasound:     0     mins  67522;    Self Care Trainin     mins  01339;    Untimed:  Electrical Stimulation:    0     mins  66704 ( );  Mechanical Traction:    0     mins  07120;   Dry Needlin     mins   Re-evaluation                0      mins  59874;  Group Therapy              0      mins  14367;    Timed Treatment:   55   mins   Total Treatment:     55   mins    Alfonso Ricketts PT  Physical Therapist

## 2025-05-15 ENCOUNTER — TREATMENT (OUTPATIENT)
Dept: PHYSICAL THERAPY | Facility: CLINIC | Age: 65
End: 2025-05-15
Payer: OTHER MISCELLANEOUS

## 2025-05-15 DIAGNOSIS — M54.50 CHRONIC RIGHT-SIDED LOW BACK PAIN, UNSPECIFIED WHETHER SCIATICA PRESENT: ICD-10-CM

## 2025-05-15 DIAGNOSIS — G89.29 CHRONIC RIGHT-SIDED LOW BACK PAIN, UNSPECIFIED WHETHER SCIATICA PRESENT: ICD-10-CM

## 2025-05-15 DIAGNOSIS — M25.551 PAIN OF RIGHT HIP: Primary | ICD-10-CM

## 2025-05-15 PROCEDURE — 97110 THERAPEUTIC EXERCISES: CPT | Performed by: PHYSICAL THERAPIST

## 2025-05-15 PROCEDURE — 97112 NEUROMUSCULAR REEDUCATION: CPT | Performed by: PHYSICAL THERAPIST

## 2025-05-15 PROCEDURE — 97530 THERAPEUTIC ACTIVITIES: CPT | Performed by: PHYSICAL THERAPIST

## 2025-05-15 NOTE — PROGRESS NOTES
Physical Therapy Daily Progress Note    [facility]  [address]  [place] [address]      Patient: Guy Sheth   : 1960  Diagnosis/ICD-10 Code:  Pain of right hip [M25.551]  Referring practitioner: Lee Noel MD  Date of Initial Visit: Type: THERAPY  Noted: 3/27/2025  Today's Date: 5/15/2025  Patient seen for 12 sessions           Subjective   Patient reports R low back is a little irritated today which he ascribes to very little sleep due to working overnight.    Objective   See Exercise, Manual, and Modality Logs for complete treatment.       Assessment/Plan  Reduced intensity of R hip activities due to poor sleep and pain prior to treatment. Screen of R periscapular symptoms suggest possible cervical spine origin, as R Spurlings test reproduced his main symptoms. Provided patient with cervicothoracic mobility exercise for patient to trial until next visit to see if symptoms are modified.            Timed:  Manual Therapy:    0     mins  58710;  Therapeutic Exercise:    18     mins  54086;     Neuromuscular Arturo:   8    mins  41337;    Therapeutic Activity:     12     mins  59185;     Gait Trainin     mins  49275;     Ultrasound:     0     mins  65739;    Self Care Trainin     mins  69048;    Untimed:  Electrical Stimulation:    0     mins  34282 ( );  Mechanical Traction:    0     mins  84586;   Dry Needlin     mins   Re-evaluation                0      mins  16128;  Group Therapy              0      mins  61255;    Timed Treatment:   38   mins   Total Treatment:     38   mins    Alfonso Ricketts PT  Physical Therapist

## 2025-05-19 ENCOUNTER — TREATMENT (OUTPATIENT)
Dept: PHYSICAL THERAPY | Facility: CLINIC | Age: 65
End: 2025-05-19
Payer: OTHER MISCELLANEOUS

## 2025-05-19 ENCOUNTER — OFFICE VISIT (OUTPATIENT)
Age: 65
End: 2025-05-19
Payer: OTHER MISCELLANEOUS

## 2025-05-19 VITALS
WEIGHT: 208 LBS | SYSTOLIC BLOOD PRESSURE: 128 MMHG | HEIGHT: 70 IN | BODY MASS INDEX: 29.78 KG/M2 | DIASTOLIC BLOOD PRESSURE: 84 MMHG

## 2025-05-19 DIAGNOSIS — M16.11 PRIMARY OSTEOARTHRITIS OF RIGHT HIP: ICD-10-CM

## 2025-05-19 DIAGNOSIS — G89.29 CHRONIC RIGHT-SIDED LOW BACK PAIN, UNSPECIFIED WHETHER SCIATICA PRESENT: ICD-10-CM

## 2025-05-19 DIAGNOSIS — M75.81 TENDINITIS OF RIGHT ROTATOR CUFF: Primary | ICD-10-CM

## 2025-05-19 DIAGNOSIS — M19.011 ARTHRITIS OF RIGHT GLENOHUMERAL JOINT: ICD-10-CM

## 2025-05-19 DIAGNOSIS — M54.50 CHRONIC RIGHT-SIDED LOW BACK PAIN, UNSPECIFIED WHETHER SCIATICA PRESENT: ICD-10-CM

## 2025-05-19 DIAGNOSIS — M25.551 PAIN OF RIGHT HIP: Primary | ICD-10-CM

## 2025-05-19 PROCEDURE — 97110 THERAPEUTIC EXERCISES: CPT | Performed by: PHYSICAL THERAPIST

## 2025-05-19 PROCEDURE — 97112 NEUROMUSCULAR REEDUCATION: CPT | Performed by: PHYSICAL THERAPIST

## 2025-05-19 PROCEDURE — 97530 THERAPEUTIC ACTIVITIES: CPT | Performed by: PHYSICAL THERAPIST

## 2025-05-19 RX ORDER — BUPIVACAINE HYDROCHLORIDE 5 MG/ML
4 INJECTION, SOLUTION EPIDURAL; INTRACAUDAL; PERINEURAL
Status: COMPLETED | OUTPATIENT
Start: 2025-05-19 | End: 2025-05-19

## 2025-05-19 RX ORDER — TRIAMCINOLONE ACETONIDE 40 MG/ML
80 INJECTION, SUSPENSION INTRA-ARTICULAR; INTRAMUSCULAR
Status: COMPLETED | OUTPATIENT
Start: 2025-05-19 | End: 2025-05-19

## 2025-05-19 RX ORDER — LIDOCAINE HYDROCHLORIDE 10 MG/ML
4 INJECTION, SOLUTION EPIDURAL; INFILTRATION; INTRACAUDAL; PERINEURAL
Status: COMPLETED | OUTPATIENT
Start: 2025-05-19 | End: 2025-05-19

## 2025-05-19 RX ADMIN — BUPIVACAINE HYDROCHLORIDE 4 ML: 5 INJECTION, SOLUTION EPIDURAL; INTRACAUDAL; PERINEURAL at 11:04

## 2025-05-19 RX ADMIN — LIDOCAINE HYDROCHLORIDE 4 ML: 10 INJECTION, SOLUTION EPIDURAL; INFILTRATION; INTRACAUDAL; PERINEURAL at 11:04

## 2025-05-19 RX ADMIN — TRIAMCINOLONE ACETONIDE 80 MG: 40 INJECTION, SUSPENSION INTRA-ARTICULAR; INTRAMUSCULAR at 11:04

## 2025-05-19 NOTE — PROGRESS NOTES
[facility] Follow Up Visit     Office Follow Up      Date: 05/19/2025   Patient Name: Guy Sheth  MRN: [id number]  YOB: 1960    Referring Physician: No ref. provider found     Chief Complaint:   Chief Complaint   Patient presents with   • Follow-up     4 month follow up -- Tendinitis of right rotator cuff       History of Present Illness: Guy Sheth is a 65 y.o. male who returns to clinic today for follow up on right shoulder pain. His pain is a 5 /10 on the pain scale. Patient has tried the following previous treatments physical therapy . He mentions current symptoms of same as prior . He states that these treatments have worsened.      Subjective     Review of Systems: Review of Systems   Constitutional:  Negative for chills, fever, unexpected weight gain and unexpected weight loss.   HENT:  Negative for congestion, postnasal drip and rhinorrhea.    Eyes:  Negative for blurred vision.   Respiratory:  Negative for shortness of breath.    Cardiovascular:  Negative for leg swelling.   Gastrointestinal:  Negative for abdominal pain, nausea and vomiting.   Genitourinary:  Negative for difficulty urinating.   Musculoskeletal:  Positive for arthralgias. Negative for gait problem, joint swelling and myalgias.   Skin:  Negative for skin lesions and wound.   Neurological:  Negative for dizziness, weakness, light-headedness and numbness.   Hematological:  Does not bruise/bleed easily.   Psychiatric/Behavioral:  Negative for depressed mood.         Medications:   Current Outpatient Medications:   •  aspirin  MG tablet, Take 1 tablet by mouth Daily., Disp: , Rfl:   •  bisoprolol (ZEBeta) 10 MG tablet, Take 1 tablet by mouth Daily., Disp: 90 tablet, Rfl: 3  •  Breyna 80-4.5 MCG/ACT inhaler, , Disp: , Rfl:   •  cetirizine (zyrTEC) 10 MG tablet, Take 1 tablet by mouth Daily., Disp: , Rfl:   •  cloNIDine (Catapres) 0.1 MG tablet, Take 1 tablet  by mouth Every 8 (Eight) Hours As Needed for High Blood Pressure. SBP>150 MM/HG & OR DBP> 95 MM/HG, Disp: 90 tablet, Rfl: 3  •  Crestor 20 MG tablet, Take 1 tablet by mouth Daily., Disp: 30 tablet, Rfl: 4  •  cyclobenzaprine (FLEXERIL) 10 MG tablet, Take 1 tablet by mouth At Night As Needed for Muscle Spasms., Disp: 30 tablet, Rfl: 0  •  DULoxetine (CYMBALTA) 60 MG capsule, Take 1 capsule by mouth Daily., Disp: , Rfl:   •  famotidine (PEPCID) 20 MG tablet, , Disp: , Rfl:   •  Farxiga 10 MG tablet, , Disp: , Rfl:   •  glipizide (GLUCOTROL XL) 5 MG ER tablet, 1 tablet., Disp: , Rfl:   •  Kerendia 10 MG tablet, Take 1 tablet by mouth Daily., Disp: , Rfl:   •  linaclotide (LINZESS) 290 MCG capsule capsule, Every Morning, Disp: , Rfl:   •  metaxalone (SKELAXIN) 800 MG tablet, TAKE 1 TABLET BY MOUTH THREE TIMES DAILY AS NEEDED FOR MUSCLE PAIN, Disp: , Rfl:   •  metFORMIN (GLUCOPHAGE) 500 MG tablet, 1 tablet., Disp: , Rfl:   •  Olmesartan-amLODIPine-HCTZ 20-5-12.5 MG tablet, Take 1 tablet by mouth 2 (Two) Times a Day., Disp: 180 tablet, Rfl: 3  •  pantoprazole (PROTONIX) 40 MG EC tablet, Take 1 tablet by mouth Daily., Disp: , Rfl:   •  tamsulosin (FLOMAX) 0.4 MG capsule 24 hr capsule, , Disp: , Rfl:   •  temazepam (RESTORIL) 15 MG capsule, Take 1 capsule by mouth 2 (Two) Times a Day., Disp: , Rfl:   •  traZODone (DESYREL) 150 MG tablet, Take 1 tablet by mouth At Night As Needed for Sleep., Disp: , Rfl:     Allergies:   Allergies   Allergen Reactions   • Levaquin [Levofloxacin] Itching and GI Intolerance   • Crestor [Rosuvastatin] Myalgia     Generic    • Amoxicillin-Pot Clavulanate Hives and Itching   • Codeine    • Contrast Dye (Echo Or Unknown Ct/Mr)    • Hydrocodone Other (See Comments)   • Lopressor [Metoprolol Tartrate]    • Medrol [Methylprednisolone] Other (See Comments)     Blood in stool   • Morphine And Codeine    • Prednisone Other (See Comments)   • Sulfa Antibiotics    • Tirzepatide GI Intolerance   • Lortab  "[Hydrocodone-Acetaminophen] Rash     Nose bleed       I have reviewed and updated the patient's chief complaint, history of present illness, review of systems, past medical history, surgical history, family history, social history, medications and allergy list as appropriate.     Objective      Vital Signs:   Vitals:    05/19/25 1016   BP: 128/84   Weight: 94.3 kg (208 lb)   Height: 177.8 cm (70\")     Body mass index is 29.84 kg/m².  BMI is >= 25 and <30. (Overweight) The following options were offered after discussion;: exercise counseling/recommendations and nutrition counseling/recommendations     Patient reports that he is a former smoker. He quit smoking in 2003.  He has not resumed smoking since that time.  This behavior was applauded and she was encouraged to continue in smoking cessation.  We will continue to monitor at subsequent visits.    Ortho Exam:  Cardiovascular System: Arterial Pulses Right: radial normal. Arterial Pulses Left: radial normal.   C-Spine/Neck: Active Range of Motion: no crepitus or pain elicited on motion and flexion normal, extension normal, rotation normal, and lateral flexion normal.   Shoulders: Inspection Right: no misalignment, erythema, induration, swelling, or warmth and AC prominence normal. Inspection Left: no misalignment, erythema, induration, swelling, or warmth and AC prominence normal. Bony Palpation Right: tenderness of the acromioclavicular joint, the greater tuberosity, and the bicipital groove and no tenderness of the clavicle. Soft Tissue Palpation Right: tenderness of the supraspinatus, the infraspinatus, the glenohumeral joint region, and the lateral cuff insertion. Active Range of Motion Right: limited. Special Tests Right: Hawkin's test positive and empty can sign positive.   exam RIGHT shoulder: forward flexion approximately 120 degrees. Abduction 120 degrees. Internal rotation SI. Motor testing supraspinatus 4/5  exam LEFT shoulder: forward flexion " approximately 140 degrees. Abduction 140 degrees. Internal rotation L1. Motor testing supraspinatus 5/5    Results Review:   Imaging Results (Last 24 Hours)       ** No results found for the last 24 hours. **            Procedures    Assessment / Plan      Assessment/Plan:   Diagnoses and all orders for this visit:    1. Tendinitis of right rotator cuff (Primary)    2. Arthritis of right glenohumeral joint    3. Primary osteoarthritis of right hip  -     MRI Hip Right Without Contrast; Future    Other orders  -     - Large Joint Arthrocentesis: R subacromial bursa    Plan:  Persistent anterior and lateral shoulder pain on the right side.  Not responding to therapy.  More symptoms coming from the rotator cuff in the glenohumeral joint today.  Perform subacromial bursa corticosteroid injection in the office today.  Continue with therapy.  If symptoms persist in the anterior shoulder, consider glenohumeral joint corticosteroid injection.  Obtain mri right hip for persistent pain from arthritis not responding to conservative care.  Follow-up as needed.    Follow Up:   No follow-ups on file.      Mann Noel MD  [facility] [place] [facility]

## 2025-05-19 NOTE — PROGRESS NOTES
Physical Therapy Daily Progress Note    [facility]  [address]  [place] [address]      Patient: Guy Sheth   : 1960  Diagnosis/ICD-10 Code:  Pain of right hip [M25.551]  Referring practitioner: Lee Noel MD  Date of Initial Visit: Type: THERAPY  Noted: 3/27/2025  Today's Date: 2025  Patient seen for 13 sessions           Subjective   Patient reports having injection in R shoulder today, no recent changes in R LE symptoms, and having MRI ordered for R hip.    Objective   See Exercise, Manual, and Modality Logs for complete treatment.       Assessment/Plan  Resumed patient's full program since he had the opportunity to recover from his difficult work shift and lack of sleep. He reported faster onset of R hip pain with standing CKC hip strengthening, but it did not worsen throughout the sets.    R hip seemed moderately irritable relative to previous visits.        Timed:  Manual Therapy:    0     mins  62241;  Therapeutic Exercise:    15     mins  02464;     Neuromuscular Arturo:   25    mins  17302;    Therapeutic Activity:     10     mins  64996;     Gait Trainin     mins  30130;     Ultrasound:     0     mins  59388;    Self Care Trainin     mins  36235;    Untimed:  Electrical Stimulation:    0     mins  50904 ( );  Mechanical Traction:    0     mins  04294;   Dry Needlin     mins   Re-evaluation                0      mins  49653;  Group Therapy              0      mins  76106;    Timed Treatment:   50   mins   Total Treatment:     50   mins    Alfonso Ricketts PT  Physical Therapist

## 2025-05-19 NOTE — PROGRESS NOTES
Procedure   - Large Joint Arthrocentesis: R subacromial bursa on 5/19/2025 11:04 AM  Indications: pain  Details: 21 G needle, posterior approach  Medications: 4 mL lidocaine PF 1% 1 %; 80 mg triamcinolone acetonide 40 MG/ML; 4 mL bupivacaine (PF) 0.5 %  Outcome: tolerated well, no immediate complications  Procedure, treatment alternatives, risks and benefits explained, specific risks discussed. Consent was given by the patient. Immediately prior to procedure a time out was called to verify the correct patient, procedure, equipment, support staff and site/side marked as required. Patient was prepped and draped in the usual sterile fashion.

## 2025-05-22 ENCOUNTER — TREATMENT (OUTPATIENT)
Dept: PHYSICAL THERAPY | Facility: CLINIC | Age: 65
End: 2025-05-22
Payer: OTHER MISCELLANEOUS

## 2025-05-22 DIAGNOSIS — M54.50 CHRONIC RIGHT-SIDED LOW BACK PAIN, UNSPECIFIED WHETHER SCIATICA PRESENT: ICD-10-CM

## 2025-05-22 DIAGNOSIS — M25.551 PAIN OF RIGHT HIP: Primary | ICD-10-CM

## 2025-05-22 DIAGNOSIS — G89.29 CHRONIC RIGHT-SIDED LOW BACK PAIN, UNSPECIFIED WHETHER SCIATICA PRESENT: ICD-10-CM

## 2025-05-22 PROCEDURE — 97112 NEUROMUSCULAR REEDUCATION: CPT | Performed by: PHYSICAL THERAPIST

## 2025-05-22 PROCEDURE — 97530 THERAPEUTIC ACTIVITIES: CPT | Performed by: PHYSICAL THERAPIST

## 2025-05-22 PROCEDURE — 97110 THERAPEUTIC EXERCISES: CPT | Performed by: PHYSICAL THERAPIST

## 2025-05-22 NOTE — PROGRESS NOTES
Physical Therapy Daily Progress Note    [facility]  [address]  [place] [address]      Patient: Guy Sheth   : 1960  Diagnosis/ICD-10 Code:  Pain of right hip [M25.551]  Referring practitioner: Lee Noel MD  Date of Initial Visit: Type: THERAPY  Noted: 3/27/2025  Today's Date: 2025  Patient seen for 14 sessions           Subjective   Patient reports no changes in R LE symptoms, though he has noticed his R LE feels like it will buckle on him when navigating tight spaces.    Objective   See Exercise, Manual, and Modality Logs for complete treatment.       Assessment/Plan  Attempted progression of R LE functional strengthening, but he was limited by gross weakness and R knee pain. He was able to complete newer exercises using  L LE without pain or significant difficulty.            Timed:  Manual Therapy:    0     mins  26221;  Therapeutic Exercise:    15     mins  27419;     Neuromuscular Arturo:   25    mins  25813;    Therapeutic Activity:     15     mins  34144;     Gait Trainin     mins  14515;     Ultrasound:     0     mins  05063;    Self Care Trainin     mins  61522;    Untimed:  Electrical Stimulation:    0     mins  06431 ( );  Mechanical Traction:    0     mins  45113;   Dry Needlin     mins   Re-evaluation                0      mins  76573;  Group Therapy              0      mins  69087;    Timed Treatment:   55   mins   Total Treatment:     55   mins    Alfonso Ricketts PT  Physical Therapist

## 2025-05-27 ENCOUNTER — TREATMENT (OUTPATIENT)
Dept: PHYSICAL THERAPY | Facility: CLINIC | Age: 65
End: 2025-05-27
Payer: OTHER MISCELLANEOUS

## 2025-05-27 DIAGNOSIS — M54.50 CHRONIC RIGHT-SIDED LOW BACK PAIN, UNSPECIFIED WHETHER SCIATICA PRESENT: ICD-10-CM

## 2025-05-27 DIAGNOSIS — M25.551 PAIN OF RIGHT HIP: Primary | ICD-10-CM

## 2025-05-27 DIAGNOSIS — G89.29 CHRONIC RIGHT-SIDED LOW BACK PAIN, UNSPECIFIED WHETHER SCIATICA PRESENT: ICD-10-CM

## 2025-05-27 PROCEDURE — 97164 PT RE-EVAL EST PLAN CARE: CPT | Performed by: PHYSICAL THERAPIST

## 2025-05-27 PROCEDURE — 97110 THERAPEUTIC EXERCISES: CPT | Performed by: PHYSICAL THERAPIST

## 2025-05-27 PROCEDURE — 97112 NEUROMUSCULAR REEDUCATION: CPT | Performed by: PHYSICAL THERAPIST

## 2025-05-27 PROCEDURE — 97530 THERAPEUTIC ACTIVITIES: CPT | Performed by: PHYSICAL THERAPIST

## 2025-05-27 NOTE — PROGRESS NOTES
Re-Assessment / Re-Certification      [facility]  [address]  [place] [address]    Patient: Guy Sheth   : 1960  Diagnosis/ICD-10 Code:  Pain of right hip [M25.551]  Referring practitioner: Lee Noel MD  Date of Initial Visit: Type: THERAPY  Noted: 3/27/2025  Today's Date: 2025  Patient seen for 15 sessions      Subjective:     Subjective Questionnaire: LEFS: 42/80  Clinical Progress: improved  Home Program Compliance: Yes  Treatment has included: therapeutic exercise, neuromuscular re-education, and therapeutic activity    Subjective Evaluation    History of Present Illness  Date of onset: 2025  Mechanism of injury: CLOF: standing/walking limited to 30-45 min before needing to sit for partial R LE symptom relief    Subjective comment: Patient reports his activity capacity/tolerance is slowly improving. R hip still hurts worse during bed mobility.  Patient Occupation: , works 2nd shift   Precautions and Work Restrictions: nonePain  Current pain ratin  At best pain ratin  At worst pain ratin         Objective          Neurological Testing     Sensation     Lumbar   Left   Intact: light touch    Comments   Right light touch: decreased L3    Additional Neurological Details  Slump (+) on R    Active Range of Motion   Left Knee   Flexion: WFL and with pain  Extension: WFL    Right Knee   Flexion: WFL  Extension: WFL    Additional Active Range of Motion Details  Flexion: touches mid shin, posterior thigh tension  Extension: 50%    Passive Range of Motion   Left Hip   Flexion: 120 (R hip/low back pain) degrees   External rotation (90/90): WFL  Internal rotation (90/90): WFL    Right Hip   Flexion: 120 (R hip/low back pain) degrees   Extension: 5 (significant R hip) degrees   External rotation (90/90): 55 degrees with pain  Internal rotation (90/90): 20 degrees with pain  Left Knee   Flexion: WFL and with pain  Extension:  WFL    Right Knee   Flexion: WFL  Extension: WFL    Strength/Myotome Testing     Left Hip   Planes of Motion   Flexion: 4- (back pain)    Right Hip   Planes of Motion   Flexion: 4+  Abduction: 4    Left Knee   Flexion: 5  Extension: 5  Quadriceps contraction: good    Right Knee   Flexion: 4+ (R lateral hip/thigh pain)  Extension: 5    Left Ankle/Foot   Dorsiflexion: 5  Plantar flexion: 5    Right Ankle/Foot   Dorsiflexion: 5  Plantar flexion: 5    Tests     Right Hip   Positive FADIR.       Assessment & Plan       Assessment  Impairments: abnormal coordination, abnormal muscle firing, abnormal or restricted ROM, activity intolerance, impaired physical strength, lacks appropriate home exercise program and pain with function   Functional limitations: carrying objects, lifting, sleeping, walking, uncomfortable because of pain, moving in bed and standing   Assessment details: Patient is a 65-year-old male who presents with right hip pain radiating to his distal knee, with distal numbness/tingling, after slip and fall injury on 1/13.  RLE symptoms are exacerbated by prolonged standing/walking and alleviated with sitting/forward bending.  Right slump test is positive.  He demonstrates weakness with right hip flexion, right knee extension, and right ankle dorsiflexion.  Right L4 sensation is diminished.  Increased pain reported with right hip PROM flexion and extension.    4/24- Patient reporting functional and symptomatic improvements related to his low back and right lower extremity.  Walking tolerance is improved from 10 minutes to greater than 30 minutes.  Right lower extremity strength continues to improve, though right hip PROM was decreased and more painful today.  He continues to report symptoms radiating below his knee and into his ankle, mostly tingling.    5/27- Patient demonstrating improved R hip strength and PROM while reporting modest symptomatic and functional improvements. He continues to await  authorization for R hip MRI but is progressing with PT.  Barriers to therapy: Chronicity, comorbidities  Prognosis: good    Goals  Plan Goals: Short Term Goals (4 weeks)  1. Patient to be compliant with initial HEP.  2.  Patient to report walking >20 minutes without increased pain.  3.  Patient to improve LEFS to >40/80.    Long Term Goals (8 weeks)  1. Patient to report walking >40 minutes without increased pain.  2. Patient to improve LEFS to >46/80.  3. Patient to demonstrate independence with home exercises.      Plan  Therapy options: will be seen for skilled therapy services  Planned modality interventions: cryotherapy, TENS, thermotherapy (hydrocollator packs), dry needling, traction, iontophoresis and ultrasound  Planned therapy interventions: abdominal trunk stabilization, body mechanics training, functional ROM exercises, home exercise program, manual therapy, motor coordination training, neuromuscular re-education, soft tissue mobilization, strengthening, therapeutic activities, ADL retraining, postural training, spinal/joint mobilization, stretching, joint mobilization, gait training and balance/weight-bearing training  Frequency: 2x week  Duration in weeks: 4  Treatment plan discussed with: patient      Progress toward previous goals: Partially Met        Timeframe: 1 month    PT Signature: Alfonso Ricketts, PT  PT License [id number]    Based upon review of the patient's progress and continued therapy plan, it is my medical opinion that Guy Sheth should continue physical therapy treatment at [facility]  [address] [place] [id number] [phone].    Signature: __________________________________  Lee Noel MD    Timed:  Manual Therapy:    0     mins  12317;  Therapeutic Exercise:    10     mins  14209;     Neuromuscular Arturo:    20    mins  74275;    Therapeutic Activity:     10     mins  05879;     Gait Trainin     mins   99169;     Ultrasound:     0     mins  68026;    Self Care Trainin     mins  00041;    Untimed:  Electrical Stimulation:    0     mins  38702 ( );  Mechanical Traction:    0     mins  09722;   Dry Needlin     mins   Re-evaluation                20      mins  27387;  Group Therapy              0      mins  08326;    Timed Treatment:   40   mins   Total Treatment:     60   mins

## 2025-05-29 ENCOUNTER — TREATMENT (OUTPATIENT)
Dept: PHYSICAL THERAPY | Facility: CLINIC | Age: 65
End: 2025-05-29
Payer: OTHER MISCELLANEOUS

## 2025-05-29 DIAGNOSIS — G89.29 CHRONIC RIGHT-SIDED LOW BACK PAIN, UNSPECIFIED WHETHER SCIATICA PRESENT: ICD-10-CM

## 2025-05-29 DIAGNOSIS — M25.551 PAIN OF RIGHT HIP: Primary | ICD-10-CM

## 2025-05-29 DIAGNOSIS — M54.50 CHRONIC RIGHT-SIDED LOW BACK PAIN, UNSPECIFIED WHETHER SCIATICA PRESENT: ICD-10-CM

## 2025-05-29 PROCEDURE — 97110 THERAPEUTIC EXERCISES: CPT | Performed by: PHYSICAL THERAPIST

## 2025-05-29 PROCEDURE — 97112 NEUROMUSCULAR REEDUCATION: CPT | Performed by: PHYSICAL THERAPIST

## 2025-05-29 PROCEDURE — 97530 THERAPEUTIC ACTIVITIES: CPT | Performed by: PHYSICAL THERAPIST

## 2025-05-29 NOTE — PROGRESS NOTES
Physical Therapy Daily Progress Note    [facility]  [address]  [place] [address]      Patient: Guy Sheth   : 1960  Diagnosis/ICD-10 Code:  Pain of right hip [M25.551]  Referring practitioner: Lee Noel MD  Date of Initial Visit: Type: THERAPY  Noted: 3/27/2025  Today's Date: 2025  Patient seen for 16 sessions           Subjective   Patient reports increased R hip and L knee pain over the past few days. He also slept little     Objective   See Exercise, Manual, and Modality Logs for complete treatment.       Assessment/Plan  Increased irritability noted with sciatic neural mobility and lumbopelvic retraining today. Some of his more challenging exercises were held due to irritability of his condition and overall fatigue from lack of sleep the night before.            Timed:  Manual Therapy:    0     mins  90795;  Therapeutic Exercise:    16     mins  46069;     Neuromuscular Arturo:   15    mins  92778;    Therapeutic Activity:     8     mins  41277;     Gait Trainin     mins  94355;     Ultrasound:     0     mins  38848;    Self Care Trainin     mins  79450;    Untimed:  Electrical Stimulation:    0     mins  30733 ( );  Mechanical Traction:    0     mins  27263;   Dry Needlin     mins   Re-evaluation                0      mins  90174;  Group Therapy              0      mins  92331;    Timed Treatment:   39   mins   Total Treatment:     39   mins    Alfonso Ricketts PT  Physical Therapist

## 2025-06-02 ENCOUNTER — TREATMENT (OUTPATIENT)
Dept: PHYSICAL THERAPY | Facility: CLINIC | Age: 65
End: 2025-06-02
Payer: OTHER MISCELLANEOUS

## 2025-06-02 DIAGNOSIS — M25.551 PAIN OF RIGHT HIP: Primary | ICD-10-CM

## 2025-06-02 DIAGNOSIS — M54.50 CHRONIC RIGHT-SIDED LOW BACK PAIN, UNSPECIFIED WHETHER SCIATICA PRESENT: ICD-10-CM

## 2025-06-02 DIAGNOSIS — G89.29 CHRONIC RIGHT-SIDED LOW BACK PAIN, UNSPECIFIED WHETHER SCIATICA PRESENT: ICD-10-CM

## 2025-06-02 PROCEDURE — 97530 THERAPEUTIC ACTIVITIES: CPT | Performed by: PHYSICAL THERAPIST

## 2025-06-02 PROCEDURE — 97110 THERAPEUTIC EXERCISES: CPT | Performed by: PHYSICAL THERAPIST

## 2025-06-02 PROCEDURE — 97112 NEUROMUSCULAR REEDUCATION: CPT | Performed by: PHYSICAL THERAPIST

## 2025-06-02 NOTE — PROGRESS NOTES
Physical Therapy Daily Progress Note    [facility]  [address]  [place] [address]      Patient: Guy Sheth   : 1960  Diagnosis/ICD-10 Code:  Pain of right hip [M25.551]  Referring practitioner: Lee Noel MD  Date of Initial Visit: Type: THERAPY  Noted: 3/27/2025  Today's Date: 2025  Patient seen for 17 sessions           Subjective   Patient reports overdoing things this weekend, moving a heavy chair, since which his L shoulder and R hip have hurt worse.    Objective   See Exercise, Manual, and Modality Logs for complete treatment.       Assessment/Plan  Patient's condition appeared more irritable today, as he reported increased right hip pain during standing hip extension exercise, which has historically been comfortable for him.  Held lateral hip strengthening due to degree of irritability of his hip pain.  Recurrence of left knee pain was a limiting factor during [facility] lower extremity strengthening today.            Timed:  Manual Therapy:    0     mins  51163;  Therapeutic Exercise:    11     mins  13136;     Neuromuscular Arturo:   23    mins  03617;    Therapeutic Activity:     8     mins  83059;     Gait Trainin     mins  62880;     Ultrasound:     0     mins  22561;    Self Care Trainin     mins  89970;    Untimed:  Electrical Stimulation:    0     mins  36952 ( );  Mechanical Traction:    0     mins  32019;   Dry Needlin     mins   Re-evaluation                0      mins  03153;  Group Therapy              0      mins  25838;    Timed Treatment:   42   mins   Total Treatment:     42   mins    Alfonso Ricketts PT  Physical Therapist

## 2025-06-03 DIAGNOSIS — M16.11 PRIMARY OSTEOARTHRITIS OF RIGHT HIP: Primary | ICD-10-CM

## 2025-06-05 ENCOUNTER — TREATMENT (OUTPATIENT)
Dept: PHYSICAL THERAPY | Facility: CLINIC | Age: 65
End: 2025-06-05
Payer: OTHER MISCELLANEOUS

## 2025-06-05 DIAGNOSIS — G89.29 CHRONIC RIGHT-SIDED LOW BACK PAIN, UNSPECIFIED WHETHER SCIATICA PRESENT: ICD-10-CM

## 2025-06-05 DIAGNOSIS — M25.551 PAIN OF RIGHT HIP: Primary | ICD-10-CM

## 2025-06-05 DIAGNOSIS — M54.50 CHRONIC RIGHT-SIDED LOW BACK PAIN, UNSPECIFIED WHETHER SCIATICA PRESENT: ICD-10-CM

## 2025-06-05 NOTE — PROGRESS NOTES
Physical Therapy Daily Progress Note    [facility]  [address]  [place] [address]      Patient: Guy Sheth   : 1960  Diagnosis/ICD-10 Code:  Pain of right hip [M25.551]  Referring practitioner: Lee Noel MD  Date of Initial Visit: Type: THERAPY  Noted: 3/27/2025  Today's Date: 2025  Patient seen for 18 sessions           Subjective   Patient reports worsening R hip and groin pain in the past few days, but he has not noticed much radiating symptoms below his R hip.    Objective          Passive Range of Motion     Right Hip   Flexion: 100 degrees with pain  Internal rotation (90/90): 10 degrees with pain    Tests     Right Hip   Positive FADIR and scour.       See Exercise, Manual, and Modality Logs for complete treatment.       Assessment/Plan  Screen of R hip suggested recent symptoms were of R hip joint origin. Trial of R hip mobilization, to which individuals with hip joint-specific impairments normally respond well, increased his R hip symptoms.     With poor tolerance to activity, few exercises were completed today to avoid further exacerbation.        Timed:  Manual Therapy:    15     mins  34430;  Therapeutic Exercise:    10     mins  47503;     Neuromuscular Arturo:   0    mins  36580;    Therapeutic Activity:     15     mins  05167;     Gait Trainin     mins  12862;     Ultrasound:     0     mins  56139;    Self Care Trainin     mins  56314;    Untimed:  Electrical Stimulation:    0     mins  69011 ( );  Mechanical Traction:    0     mins  25566;   Dry Needlin     mins   Re-evaluation                0      mins  54458;  Group Therapy              0      mins  62603;    Timed Treatment:   40   mins   Total Treatment:     40   mins    Alfonso Ricketts PT  Physical Therapist

## 2025-06-05 NOTE — TELEPHONE ENCOUNTER
[facility]  [address] [place] [address], [address]  [place] [address]  PHONE: [phone]      25    NAME:  Mohinder Bryan  : 1938  MRN: [id number]       SUBJECTIVE:   Mohinder Bryan is a 87 y.o. male seen for a follow up visit regarding the following:     Chief Complaint   Patient presents with    Atrial Fibrillation         HPI:    No cp or antunez. No orthopnea or pnd. No palpitations or syncope.  Hospitalized with hyponatremia- hctz stopped- O2 added as well.    Past Medical History, Past Surgical History, Family history, Social History, and Medications were all reviewed with the patient today and updated as necessary.     Current Outpatient Medications   Medication Sig Dispense Refill    urea (URE-NA) 15 g PACK packet Take 30 g by mouth daily      apixaban (ELIQUIS) 5 MG TABS tablet Take 1 tablet by mouth 2 times daily 90 tablet 3    folic acid (FOLVITE) 1 MG tablet Take 1 tablet by mouth daily      magnesium oxide (MAG-OX) 400 MG tablet Take 1 tablet by mouth daily 90 tablet 1    furosemide (LASIX) 20 MG tablet Take 1 tablet by mouth daily      omeprazole (PRILOSEC) 20 MG delayed release capsule Take 1 capsule by mouth Daily      spironolactone (ALDACTONE) 25 MG tablet Take 0.5 tablets by mouth daily      atorvastatin (LIPITOR) 10 MG tablet Take 1 tablet by mouth daily      carvedilol (COREG) 25 MG tablet Take 1 tablet by mouth 2 times daily (with meals)      ferrous gluconate (FERGON) 324 (38 Fe) MG tablet Take 1 tablet by mouth daily      Ergocalciferol 50 MCG (2000 UT) CAPS Take 1 capsule by mouth daily      famotidine (PEPCID) 20 MG tablet Take 1 tablet by mouth daily      TIADYLT  MG extended release capsule Take by mouth daily (Patient not taking: Reported on 2025)       No current facility-administered medications for this visit.               Social History     Tobacco Use    Smoking status: Never    Smokeless tobacco: Never   Substance Use Topics    Alcohol use: Not on file  Patient's wife called the office stating his Crestor medication was denied due to coverage. Patient states he has to take name brand only. I submitted a PA and it was approved. Also found patient a copay card to get the medication cost to as low as $3. The copay card was faxed to [facility] in [place]. Patient's wife has been updated with this information.

## 2025-06-11 ENCOUNTER — TRANSCRIBE ORDERS (OUTPATIENT)
Dept: ADMINISTRATIVE | Facility: HOSPITAL | Age: 65
End: 2025-06-11
Payer: COMMERCIAL

## 2025-06-11 DIAGNOSIS — M16.11 PRIMARY OSTEOARTHRITIS OF RIGHT HIP: Primary | ICD-10-CM

## 2025-06-12 ENCOUNTER — TREATMENT (OUTPATIENT)
Dept: PHYSICAL THERAPY | Facility: CLINIC | Age: 65
End: 2025-06-12
Payer: OTHER MISCELLANEOUS

## 2025-06-12 DIAGNOSIS — M25.551 PAIN OF RIGHT HIP: Primary | ICD-10-CM

## 2025-06-12 DIAGNOSIS — M54.50 CHRONIC RIGHT-SIDED LOW BACK PAIN, UNSPECIFIED WHETHER SCIATICA PRESENT: ICD-10-CM

## 2025-06-12 DIAGNOSIS — G89.29 CHRONIC RIGHT-SIDED LOW BACK PAIN, UNSPECIFIED WHETHER SCIATICA PRESENT: ICD-10-CM

## 2025-06-12 PROCEDURE — 97110 THERAPEUTIC EXERCISES: CPT | Performed by: PHYSICAL THERAPIST

## 2025-06-12 PROCEDURE — 97530 THERAPEUTIC ACTIVITIES: CPT | Performed by: PHYSICAL THERAPIST

## 2025-06-12 PROCEDURE — 97112 NEUROMUSCULAR REEDUCATION: CPT | Performed by: PHYSICAL THERAPIST

## 2025-06-12 NOTE — PROGRESS NOTES
Physical Therapy Daily Progress Note    [facility]  [address]  [place] [address]      Patient: Guy Sheth   : 1960  Diagnosis/ICD-10 Code:  Pain of right hip [M25.551]  Referring practitioner: Lee Noel MD  Date of Initial Visit: Type: THERAPY  Noted: 3/27/2025  Today's Date: 2025  Patient seen for 19 sessions           Subjective   Patient reports R groin is feeling better but his lateral R hip is hurting again.    Objective   See Exercise, Manual, and Modality Logs for complete treatment.       Assessment/Plan  Patient responded to treatment better than previous visit, as R groin pain did not occur, but R lateral/posterior hip pain reported with some activities, including with R LE stance activities.    Right hip tolerance for single-leg stance activities has regressed compared to a few weeks ago, when he was able to complete multiple sets of exercise with minimal increase in right hip pain.        Timed:  Manual Therapy:    0     mins  09912;  Therapeutic Exercise:    12     mins  64203;     Neuromuscular Arturo:   23    mins  37444;    Therapeutic Activity:     10     mins  58101;     Gait Trainin     mins  22589;     Ultrasound:     0     mins  02218;    Self Care Trainin     mins  86026;    Untimed:  Electrical Stimulation:    0     mins  54381 ( );  Mechanical Traction:    0     mins  64323;   Dry Needlin     mins   Re-evaluation                0      mins  63822;  Group Therapy              0      mins  99629;    Timed Treatment:   45   mins   Total Treatment:     45   mins    Alfonso Ricketts PT  Physical Therapist

## 2025-06-16 ENCOUNTER — TREATMENT (OUTPATIENT)
Dept: PHYSICAL THERAPY | Facility: CLINIC | Age: 65
End: 2025-06-16
Payer: OTHER MISCELLANEOUS

## 2025-06-16 DIAGNOSIS — M25.551 PAIN OF RIGHT HIP: Primary | ICD-10-CM

## 2025-06-16 DIAGNOSIS — G89.29 CHRONIC RIGHT-SIDED LOW BACK PAIN, UNSPECIFIED WHETHER SCIATICA PRESENT: ICD-10-CM

## 2025-06-16 DIAGNOSIS — M54.50 CHRONIC RIGHT-SIDED LOW BACK PAIN, UNSPECIFIED WHETHER SCIATICA PRESENT: ICD-10-CM

## 2025-06-16 PROCEDURE — 97110 THERAPEUTIC EXERCISES: CPT | Performed by: PHYSICAL THERAPIST

## 2025-06-16 PROCEDURE — 97530 THERAPEUTIC ACTIVITIES: CPT | Performed by: PHYSICAL THERAPIST

## 2025-06-16 PROCEDURE — 97112 NEUROMUSCULAR REEDUCATION: CPT | Performed by: PHYSICAL THERAPIST

## 2025-06-16 NOTE — PROGRESS NOTES
Physical Therapy Daily Progress Note    [facility]  [address]  [place] [address]      Patient: Guy Sheth   : 1960  Diagnosis/ICD-10 Code:  Pain of right hip [M25.551]  Referring practitioner: Lee Noel MD  Date of Initial Visit: Type: THERAPY  Noted: 3/27/2025  Today's Date: 2025  Patient seen for 20 sessions           Subjective   Patient reports no change in R LE symptoms since last visit.    Objective   See Exercise, Manual, and Modality Logs for complete treatment.       Assessment/Plan  Patient demonstrated improved R hip activity tolerance compared to recent weeks, able to complete CKC R LE strengthening without increased pain until many repetitions were completed.    Patient may cancel next visit due to caring for a close family member and he has R hip MRI scheduled this .        Timed:  Manual Therapy:    0     mins  15440;  Therapeutic Exercise:    11     mins  31126;     Neuromuscular Arturo:   23    mins  77099;    Therapeutic Activity:     8     mins  05996;     Gait Trainin     mins  87236;     Ultrasound:     0     mins  33289;    Self Care Trainin     mins  35020;    Untimed:  Electrical Stimulation:    0     mins  70657 ( );  Mechanical Traction:    0     mins  60856;   Dry Needlin     mins   Re-evaluation                0      mins  33663;  Group Therapy              0      mins  79909;    Timed Treatment:   42   mins   Total Treatment:     42   mins    Alfonso Ricketts PT  Physical Therapist

## 2025-06-19 ENCOUNTER — TREATMENT (OUTPATIENT)
Dept: PHYSICAL THERAPY | Facility: CLINIC | Age: 65
End: 2025-06-19
Payer: OTHER MISCELLANEOUS

## 2025-06-19 DIAGNOSIS — M25.551 PAIN OF RIGHT HIP: Primary | ICD-10-CM

## 2025-06-19 DIAGNOSIS — G89.29 CHRONIC RIGHT-SIDED LOW BACK PAIN, UNSPECIFIED WHETHER SCIATICA PRESENT: ICD-10-CM

## 2025-06-19 DIAGNOSIS — M54.50 CHRONIC RIGHT-SIDED LOW BACK PAIN, UNSPECIFIED WHETHER SCIATICA PRESENT: ICD-10-CM

## 2025-06-19 PROCEDURE — 97110 THERAPEUTIC EXERCISES: CPT | Performed by: PHYSICAL THERAPIST

## 2025-06-19 PROCEDURE — 97112 NEUROMUSCULAR REEDUCATION: CPT | Performed by: PHYSICAL THERAPIST

## 2025-06-19 NOTE — PROGRESS NOTES
Physical Therapy Daily Progress Note    [facility]  [address]  [place] [address]      Patient: Guy Sheth   : 1960  Diagnosis/ICD-10 Code:  Pain of right hip [M25.551]  Referring practitioner: Lee Noel MD  Date of Initial Visit: Type: THERAPY  Noted: 3/27/2025  Today's Date: 2025  Patient seen for 21 sessions           Subjective   Patient reports no changes since last visit. He drove 13 hrs last night and did not sleep much.    Objective   See Exercise, Manual, and Modality Logs for complete treatment.       Assessment/Plan  Continued with established program as tolerated to avoid exacerbating symptoms or over fatiguing patient with his lack of sleep. R hip pain reported again with R LE SLS activities so reps were reduced.    Patient will have MRI of R hip in 3 days.        Timed:  Manual Therapy:    0     mins  01606;  Therapeutic Exercise:    13     mins  17570;     Neuromuscular Arturo:   23    mins  88678;    Therapeutic Activity:     0     mins  81069;     Gait Trainin     mins  60690;     Ultrasound:     0     mins  99602;    Self Care Trainin     mins  03872;    Untimed:  Electrical Stimulation:    0     mins  14956 (MC );  Mechanical Traction:    0     mins  02819;   Dry Needlin     mins   Re-evaluation                0      mins  87511;  Group Therapy              0      mins  03615;    Timed Treatment:   0   mins   Total Treatment:     0   mins    Alfonso Ricketts PT  Physical Therapist

## 2025-06-22 ENCOUNTER — HOSPITAL ENCOUNTER (OUTPATIENT)
Dept: MRI IMAGING | Facility: HOSPITAL | Age: 65
Discharge: HOME | End: 2025-06-22
Admitting: STUDENT IN AN ORGANIZED HEALTH CARE EDUCATION/TRAINING PROGRAM
Payer: OTHER MISCELLANEOUS

## 2025-06-22 DIAGNOSIS — M16.11 PRIMARY OSTEOARTHRITIS OF RIGHT HIP: ICD-10-CM

## 2025-06-22 PROCEDURE — 73721 MRI JNT OF LWR EXTRE W/O DYE: CPT

## 2025-06-23 ENCOUNTER — TREATMENT (OUTPATIENT)
Dept: PHYSICAL THERAPY | Facility: CLINIC | Age: 65
End: 2025-06-23
Payer: OTHER MISCELLANEOUS

## 2025-06-23 DIAGNOSIS — M54.50 CHRONIC RIGHT-SIDED LOW BACK PAIN, UNSPECIFIED WHETHER SCIATICA PRESENT: ICD-10-CM

## 2025-06-23 DIAGNOSIS — M25.551 PAIN OF RIGHT HIP: Primary | ICD-10-CM

## 2025-06-23 DIAGNOSIS — G89.29 CHRONIC RIGHT-SIDED LOW BACK PAIN, UNSPECIFIED WHETHER SCIATICA PRESENT: ICD-10-CM

## 2025-06-23 PROCEDURE — 97530 THERAPEUTIC ACTIVITIES: CPT | Performed by: PHYSICAL THERAPIST

## 2025-06-23 PROCEDURE — 97110 THERAPEUTIC EXERCISES: CPT | Performed by: PHYSICAL THERAPIST

## 2025-06-23 PROCEDURE — 97112 NEUROMUSCULAR REEDUCATION: CPT | Performed by: PHYSICAL THERAPIST

## 2025-06-23 NOTE — PROGRESS NOTES
Physical Therapy Daily Progress Note    [facility]  [address]  [place] [address]      Patient: Guy Sheth   : 1960  Diagnosis/ICD-10 Code:  Pain of right hip [M25.551]  Referring practitioner: Lee Noel MD  Date of Initial Visit: Type: THERAPY  Noted: 3/27/2025  Today's Date: 2025  Patient seen for 22 sessions           Subjective   Patient reports having MRI on R hip yesterday. No recent change in R hip symptoms, though his L shoulder is continuing to hurt worse.    Objective   See Exercise, Manual, and Modality Logs for complete treatment.       Assessment/Plan  Continued LE strengthening as tolerated and lumbopelvic mobility as results of R hip MRI are still awaited.            Timed:  Manual Therapy:    0     mins  28811;  Therapeutic Exercise:    15     mins  95751;     Neuromuscular Arturo:   15    mins  12867;    Therapeutic Activity:     8     mins  52314;     Gait Trainin     mins  83550;     Ultrasound:     0     mins  15208;    Self Care Trainin     mins  68658;    Untimed:  Electrical Stimulation:    0     mins  01514 ( );  Mechanical Traction:    0     mins  31404;   Dry Needlin     mins   Re-evaluation                0      mins  96553;  Group Therapy              0      mins  22412;    Timed Treatment:   38   mins   Total Treatment:     38   mins    Alfonso Ricketts PT  Physical Therapist

## 2025-06-26 ENCOUNTER — TREATMENT (OUTPATIENT)
Dept: PHYSICAL THERAPY | Facility: CLINIC | Age: 65
End: 2025-06-26
Payer: OTHER MISCELLANEOUS

## 2025-06-26 ENCOUNTER — OFFICE VISIT (OUTPATIENT)
Age: 65
End: 2025-06-26
Payer: OTHER MISCELLANEOUS

## 2025-06-26 DIAGNOSIS — M54.50 CHRONIC RIGHT-SIDED LOW BACK PAIN, UNSPECIFIED WHETHER SCIATICA PRESENT: ICD-10-CM

## 2025-06-26 DIAGNOSIS — M19.012 ARTHRITIS OF LEFT GLENOHUMERAL JOINT: Primary | ICD-10-CM

## 2025-06-26 DIAGNOSIS — M75.22 BICEPS TENDINITIS OF LEFT UPPER EXTREMITY: ICD-10-CM

## 2025-06-26 DIAGNOSIS — M25.551 PAIN OF RIGHT HIP: Primary | ICD-10-CM

## 2025-06-26 DIAGNOSIS — G89.29 CHRONIC RIGHT-SIDED LOW BACK PAIN, UNSPECIFIED WHETHER SCIATICA PRESENT: ICD-10-CM

## 2025-06-26 PROCEDURE — 97110 THERAPEUTIC EXERCISES: CPT | Performed by: PHYSICAL THERAPIST

## 2025-06-26 PROCEDURE — 97530 THERAPEUTIC ACTIVITIES: CPT | Performed by: PHYSICAL THERAPIST

## 2025-06-26 PROCEDURE — 97112 NEUROMUSCULAR REEDUCATION: CPT | Performed by: PHYSICAL THERAPIST

## 2025-06-26 RX ORDER — LIDOCAINE HYDROCHLORIDE 10 MG/ML
1 INJECTION, SOLUTION EPIDURAL; INFILTRATION; INTRACAUDAL; PERINEURAL
Status: COMPLETED | OUTPATIENT
Start: 2025-06-26 | End: 2025-06-26

## 2025-06-26 RX ORDER — TRIAMCINOLONE ACETONIDE 40 MG/ML
80 INJECTION, SUSPENSION INTRA-ARTICULAR; INTRAMUSCULAR
Status: COMPLETED | OUTPATIENT
Start: 2025-06-26 | End: 2025-06-26

## 2025-06-26 RX ORDER — TRAZODONE HYDROCHLORIDE 300 MG/1
300 TABLET ORAL NIGHTLY PRN
COMMUNITY

## 2025-06-26 RX ADMIN — LIDOCAINE HYDROCHLORIDE 1 ML: 10 INJECTION, SOLUTION EPIDURAL; INFILTRATION; INTRACAUDAL; PERINEURAL at 15:19

## 2025-06-26 RX ADMIN — TRIAMCINOLONE ACETONIDE 80 MG: 40 INJECTION, SUSPENSION INTRA-ARTICULAR; INTRAMUSCULAR at 15:19

## 2025-06-26 NOTE — PROGRESS NOTES
"Physical Therapy Daily Progress Note    [facility]  [address]  [place] [address]      Patient: Guy Sheth   : 1960  Diagnosis/ICD-10 Code:  Pain of right hip [M25.551]  Referring practitioner: Lee Noel MD  Date of Initial Visit: Type: THERAPY  Noted: 3/27/2025  Today's Date: 2025  Patient seen for 23 sessions           Subjective   Patient reports L shoulder \"gave out\" on him 3 days ago. He has ortho visit scheduled today to receive an injection. Last time he experienced radiating R LE pain below his knee was 2 weeks ago, though R lateral superior gluteal region continues to hurt.    Objective          Palpation     Additional Palpation Details  Joint testing: PA pressure to L4/5 and L5/S1 reproduced patient's main c/o of R hip/gluteal pain    Tenderness     Right Hip   Tenderness in the greater trochanter.     Neurological Testing     Sensation     Lumbar   Left   Intact: light touch    Comments   Right light touch: decreased L2, L4    Additional Neurological Details  Slump (+) on R    Active Range of Motion   Left Knee   Flexion: WFL and with pain  Extension: WFL    Right Knee   Flexion: WFL  Extension: WFL    Additional Active Range of Motion Details  Flexion: touches mid shin, posterior thigh tension  Extension: 50%    Passive Range of Motion   Left Hip   Flexion: 110 (R hip/low back pain) degrees   External rotation (90/90): WFL  Internal rotation (90/90): WFL    Right Hip   Flexion: 110 (R hip/low back pain) degrees   Extension: 5 (R hip/low back pain) degrees   External rotation (90/90): 55 (R hip/low back pain) degrees   Internal rotation (90/90): 20 (R groin pain) degrees   Left Knee   Flexion: WFL and with pain  Extension: WFL    Right Knee   Flexion: WFL  Extension: WFL    Strength/Myotome Testing     Left Hip   Planes of Motion   Flexion: 4+ (R hip/low back pain)  Abduction: 4 (R hip/low back pain)    Right Hip   Planes of Motion "   Flexion: 4+ (R hip/low back and groin pain)  Abduction: 4    Left Knee   Flexion: 4+ (R hip/low back pain)  Extension: 5  Quadriceps contraction: good    Right Knee   Flexion: 4+ (R lateral hip/thigh and groin pain)  Extension: 5    Left Ankle/Foot   Dorsiflexion: 5  Plantar flexion: 5    Right Ankle/Foot   Dorsiflexion: 5  Plantar flexion: 5    Tests     Right Hip   Positive FADIR.       See Exercise, Manual, and Modality Logs for complete treatment.       Assessment/Plan  Updated lumbopelvic and hip findings. Testing of R hip, L hip, and lumbar spine all reproduced hip c/o R gluteal pain.    Continued with existing program until he follows up with ortho.        Timed:  Manual Therapy:    0     mins  17644;  Therapeutic Exercise:    10     mins  85120;     Neuromuscular Arturo:   15    mins  54679;    Therapeutic Activity:     23     mins  92830;     Gait Trainin     mins  40857;     Ultrasound:     0     mins  49101;    Self Care Trainin     mins  24969;    Untimed:  Electrical Stimulation:    0     mins  60350 ( );  Mechanical Traction:    0     mins  29725;   Dry Needlin     mins   Re-evaluation                0      mins  10048;  Group Therapy              0      mins  58109;    Timed Treatment:   48   mins   Total Treatment:     63   mins    Alfonso Ricketts PT  Physical Therapist

## 2025-06-26 NOTE — PROGRESS NOTES
Procedure   - Injection Tendon or Ligament    Date/Time: 6/26/2025 3:19 PM    Performed by: Lee Noel MD  Authorized by: Lee Noel MD      Preparation: Patient was prepped and draped in the usual sterile fashion.    Anesthesia:  Local anesthesia used: no    Sedation:  Patient sedated: no    Patient tolerance: patient tolerated the procedure well with no immediate complications  Medications administered: 80 mg triamcinolone acetonide 40 MG/ML; 1 mL lidocaine PF 1% 1 %  Ultrasound guidance: yes  Laterality: left  Needle Size: 21  Approach: anterolateral       65-year-old male presents with left shoulder pain from biceps tendinitis.  Patient is here for ultrasound-guided left biceps tendon sheath corticosteroid injection.  Previous office documentation and images were personally reviewed prior to the visit.  We discussed them in detail how they correlate to experienced symptoms.  I explained the procedure in detail.  I answered all questions to the best my ability.  Risks and benefits as well as post procedure instructions were provided.  Patient elected to proceed and tolerated this procedure well.  See procedure note.  Follow-up with me will be on an as-needed basis.

## 2025-06-30 ENCOUNTER — TREATMENT (OUTPATIENT)
Dept: PHYSICAL THERAPY | Facility: CLINIC | Age: 65
End: 2025-06-30
Payer: OTHER MISCELLANEOUS

## 2025-06-30 DIAGNOSIS — M54.50 CHRONIC RIGHT-SIDED LOW BACK PAIN, UNSPECIFIED WHETHER SCIATICA PRESENT: ICD-10-CM

## 2025-06-30 DIAGNOSIS — M25.551 PAIN OF RIGHT HIP: Primary | ICD-10-CM

## 2025-06-30 DIAGNOSIS — G89.29 CHRONIC RIGHT-SIDED LOW BACK PAIN, UNSPECIFIED WHETHER SCIATICA PRESENT: ICD-10-CM

## 2025-06-30 PROCEDURE — 97164 PT RE-EVAL EST PLAN CARE: CPT | Performed by: PHYSICAL THERAPIST

## 2025-06-30 PROCEDURE — 97112 NEUROMUSCULAR REEDUCATION: CPT | Performed by: PHYSICAL THERAPIST

## 2025-06-30 PROCEDURE — 97530 THERAPEUTIC ACTIVITIES: CPT | Performed by: PHYSICAL THERAPIST

## 2025-06-30 PROCEDURE — 97110 THERAPEUTIC EXERCISES: CPT | Performed by: PHYSICAL THERAPIST

## 2025-06-30 NOTE — PROGRESS NOTES
Re-Assessment / Re-Certification      [facility]  [address]  [place] [address]    Patient: Guy Sheth   : 1960  Diagnosis/ICD-10 Code:  Pain of right hip [M25.551]  Referring practitioner: Lee Noel MD  Date of Initial Visit: Type: THERAPY  Noted: 3/27/2025  Today's Date: 2025  Patient seen for 24 sessions      Subjective:     Subjective Questionnaire: LEFS: 46/80  Clinical Progress: improved  Home Program Compliance: Yes  Treatment has included: therapeutic exercise, neuromuscular re-education, manual therapy, and therapeutic activity    Subjective Evaluation    History of Present Illness  Date of onset: 2025  Mechanism of injury: CLOF: standing/walking limited to 30-45 min before needing to sit for partial R LE symptom relief    Subjective comment: Patient reports no noticeable changes in R hip over the past month. He still gets pain with prolonged standing/walking at around the same duration and with bed mobility.  Patient Occupation: , works 2nd shift   Precautions and Work Restrictions: nonePain  Current pain ratin  At best pain ratin  At worst pain ratin         Objective          Palpation     Additional Palpation Details  Joint testing: PA pressure to L4/5 and L5/S1 reproduced patient's main c/o of R hip/gluteal pain    Tenderness     Right Hip   Tenderness in the greater trochanter.     Neurological Testing     Sensation     Lumbar   Left   Intact: light touch    Comments   Right light touch: decreased L2, L4    Additional Neurological Details  Slump (+) on R    Active Range of Motion   Left Knee   Flexion: WFL and with pain  Extension: WFL    Right Knee   Flexion: WFL  Extension: WFL    Additional Active Range of Motion Details  Flexion: touches mid shin, posterior thigh tension  Extension: 50%    Passive Range of Motion   Left Hip   Flexion: 110 (R hip/low back pain) degrees   External rotation (90/90):  WFL  Internal rotation (90/90): WFL    Right Hip   Flexion: 110 (R hip/low back pain) degrees   Extension: 5 (R hip/low back pain) degrees   External rotation (90/90): 55 (R hip/low back pain) degrees   Internal rotation (90/90): 20 (R groin pain) degrees   Left Knee   Flexion: WFL and with pain  Extension: WFL    Right Knee   Flexion: WFL  Extension: WFL    Strength/Myotome Testing     Left Hip   Planes of Motion   Flexion: 4+ (R hip/low back pain)  Abduction: 4 (R hip/low back pain)    Right Hip   Planes of Motion   Flexion: 4+ (R hip/low back and groin pain)  Abduction: 4    Left Knee   Flexion: 4+ (R hip/low back pain)  Extension: 5  Quadriceps contraction: good    Right Knee   Flexion: 4+ (R lateral hip/thigh and groin pain)  Extension: 5    Left Ankle/Foot   Dorsiflexion: 5  Plantar flexion: 5    Right Ankle/Foot   Dorsiflexion: 5  Plantar flexion: 5    Tests     Right Hip   Positive FADIR (painful but did not reproduce his main c/o of R posterior hip pain).       Assessment & Plan       Assessment  Impairments: abnormal coordination, abnormal muscle firing, abnormal or restricted ROM, activity intolerance, impaired physical strength and pain with function   Functional limitations: carrying objects, lifting, sleeping, walking, uncomfortable because of pain, moving in bed and standing   Assessment details: Patient is a 65-year-old male who presents with right hip pain radiating to his distal knee, with distal numbness/tingling, after slip and fall injury on 1/13.  RLE symptoms are exacerbated by prolonged standing/walking and alleviated with sitting/forward bending.  Right slump test is positive.  He demonstrates weakness with right hip flexion, right knee extension, and right ankle dorsiflexion.  Right L4 sensation is diminished.  Increased pain reported with right hip PROM flexion and extension.    4/24- Patient reporting functional and symptomatic improvements related to his low back and right lower  extremity.  Walking tolerance is improved from 10 minutes to greater than 30 minutes.  Right lower extremity strength continues to improve, though right hip PROM was decreased and more painful today.  He continues to report symptoms radiating below his knee and into his ankle, mostly tingling.    5/27- Patient demonstrating improved R hip strength and PROM while reporting modest symptomatic and functional improvements. He continues to await authorization for R hip MRI but is progressing with PT.    6/30- Patient reporting no significant change over the past month.  Testing of R hip, L hip, and lumbar spine (including slump test) all reproduced main c/o R gluteal pain. B hip PROM has slightly declined over the past month. Will continue PT.  Barriers to therapy: Chronicity, comorbidities  Prognosis: good    Goals  Plan Goals: Short Term Goals (4 weeks)  1. Patient to be compliant with initial HEP.  2.  Patient to report walking >20 minutes without increased pain.  3.  Patient to improve LEFS to >40/80.    Long Term Goals (8 weeks)  1. Patient to report walking >40 minutes without increased pain.  2. Patient to improve LEFS to >46/80.  3. Patient to demonstrate independence with home exercises.    New goal (4 weeks)  1. Patient to report walking >60 minutes without increased pain.  2. Patient to improve LEFS to >50/80.    Plan  Therapy options: will be seen for skilled therapy services  Planned modality interventions: cryotherapy, TENS, thermotherapy (hydrocollator packs), dry needling, traction, iontophoresis and ultrasound  Planned therapy interventions: abdominal trunk stabilization, body mechanics training, functional ROM exercises, home exercise program, manual therapy, motor coordination training, neuromuscular re-education, soft tissue mobilization, strengthening, therapeutic activities, ADL retraining, postural training, spinal/joint mobilization, stretching, joint mobilization, gait training and  balance/weight-bearing training  Frequency: 2x week  Duration in weeks: 4  Treatment plan discussed with: patient      Progress toward previous goals: Partially Met        Timeframe: 1 month    PT Signature: Alfonso Ricketts, PT  PT License [id number]    Based upon review of the patient's progress and continued therapy plan, it is my medical opinion that Guy Sheth should continue physical therapy treatment at [facility]  [address] [place] [id number] [phone].    Signature: __________________________________  Lee Noel MD    Timed:  Manual Therapy:    0     mins  82248;  Therapeutic Exercise:    10     mins  68813;     Neuromuscular Arturo:    20    mins  49050;    Therapeutic Activity:     10     mins  82715;     Gait Trainin     mins  05318;     Ultrasound:     0     mins  71535;    Self Care Trainin     mins  16754;    Untimed:  Electrical Stimulation:    0     mins  98446 ( );  Mechanical Traction:    0     mins  22903;   Dry Needlin     mins   Re-evaluation                10      mins  52220;  Group Therapy              0      mins  97735;    Timed Treatment:   40   mins   Total Treatment:     50   mins

## 2025-07-03 ENCOUNTER — TREATMENT (OUTPATIENT)
Dept: PHYSICAL THERAPY | Facility: CLINIC | Age: 65
End: 2025-07-03
Payer: OTHER MISCELLANEOUS

## 2025-07-03 DIAGNOSIS — G89.29 CHRONIC RIGHT-SIDED LOW BACK PAIN, UNSPECIFIED WHETHER SCIATICA PRESENT: ICD-10-CM

## 2025-07-03 DIAGNOSIS — M54.50 CHRONIC RIGHT-SIDED LOW BACK PAIN, UNSPECIFIED WHETHER SCIATICA PRESENT: ICD-10-CM

## 2025-07-03 DIAGNOSIS — M25.551 PAIN OF RIGHT HIP: Primary | ICD-10-CM

## 2025-07-03 PROCEDURE — 97112 NEUROMUSCULAR REEDUCATION: CPT | Performed by: PHYSICAL THERAPIST

## 2025-07-03 PROCEDURE — 97110 THERAPEUTIC EXERCISES: CPT | Performed by: PHYSICAL THERAPIST

## 2025-07-03 PROCEDURE — 97530 THERAPEUTIC ACTIVITIES: CPT | Performed by: PHYSICAL THERAPIST

## 2025-07-03 NOTE — PROGRESS NOTES
Physical Therapy Daily Progress Note    [facility]  [address]  [place] [address]      Patient: Guy Sheth   : 1960  Diagnosis/ICD-10 Code:  Pain of right hip [M25.551]  Referring practitioner: Lee Noel MD  Date of Initial Visit: Type: THERAPY  Noted: 3/27/2025  Today's Date: 7/3/2025  Patient seen for 25 sessions           Subjective   Patient reports this week has been slightly less painful with his right hip and left shoulder.    Objective   See Exercise, Manual, and Modality Logs for complete treatment.       Assessment/Plan  Modified patient's routine by progressing lumbopelvic stability and adding right hip flexor stretch to offload low back extension strain.  He tolerated new or interventions relatively well without increased pain, unless he completed an inadequate posterior pelvic tilt with bridging activities.    Patient's response to these types of activities has been variable in the past, but we will continue with similar focus during upcoming visits assuming patient tolerance.        Timed:  Manual Therapy:    0     mins  92592;  Therapeutic Exercise:    15     mins  01842;     Neuromuscular Arturo:   25    mins  13919;    Therapeutic Activity:     15     mins  18737;     Gait Trainin     mins  36906;     Ultrasound:     0     mins  81099;    Self Care Trainin     mins  10098;    Untimed:  Electrical Stimulation:    0     mins  36237 ( );  Mechanical Traction:    0     mins  94610;   Dry Needlin     mins   Re-evaluation                0      mins  77121;  Group Therapy              0      mins  05270;    Timed Treatment:   55   mins   Total Treatment:     55   mins    Alfonso Ricketts PT  Physical Therapist

## 2025-07-21 ENCOUNTER — TREATMENT (OUTPATIENT)
Dept: PHYSICAL THERAPY | Facility: CLINIC | Age: 65
End: 2025-07-21
Payer: OTHER MISCELLANEOUS

## 2025-07-21 DIAGNOSIS — M25.551 PAIN OF RIGHT HIP: Primary | ICD-10-CM

## 2025-07-21 DIAGNOSIS — M54.50 CHRONIC RIGHT-SIDED LOW BACK PAIN, UNSPECIFIED WHETHER SCIATICA PRESENT: ICD-10-CM

## 2025-07-21 DIAGNOSIS — G89.29 CHRONIC RIGHT-SIDED LOW BACK PAIN, UNSPECIFIED WHETHER SCIATICA PRESENT: ICD-10-CM

## 2025-07-21 PROCEDURE — 97110 THERAPEUTIC EXERCISES: CPT | Performed by: PHYSICAL THERAPIST

## 2025-07-21 PROCEDURE — 97112 NEUROMUSCULAR REEDUCATION: CPT | Performed by: PHYSICAL THERAPIST

## 2025-07-21 PROCEDURE — 97530 THERAPEUTIC ACTIVITIES: CPT | Performed by: PHYSICAL THERAPIST

## 2025-07-21 NOTE — PROGRESS NOTES
Physical Therapy Daily Progress Note    [facility]  [address]  [place] [address]      Patient: Guy Sheth   : 1960  Diagnosis/ICD-10 Code:  Pain of right hip [M25.551]  Referring practitioner: Lee Noel MD  Date of Initial Visit: Type: THERAPY  Noted: 3/27/2025  Today's Date: 2025  Patient seen for 26 sessions           Subjective   Patient reports no change in symptoms since last visit. Fair compliance with HEP.     Objective   See Exercise, Manual, and Modality Logs for complete treatment.       Assessment/Plan  Advanced neural mobility without increased R LE symptoms. Resumed patient's program from last visit with slight lateral hip strength progression as he has been away from PT >2 weeks and has not continued many of his newer exercises at home.            Timed:  Manual Therapy:    0     mins  47383;  Therapeutic Exercise:    23     mins  39308;     Neuromuscular Arturo:   12    mins  36071;    Therapeutic Activity:     10     mins  13139;     Gait Trainin     mins  04200;     Ultrasound:     0     mins  22101;    Self Care Trainin     mins  70808;    Untimed:  Electrical Stimulation:    0     mins  16696 ( );  Mechanical Traction:    0     mins  36315;   Dry Needlin     mins   Re-evaluation                0      mins  28646;  Group Therapy              0      mins  12434;    Timed Treatment:   45   mins   Total Treatment:     45   mins    Alfonso Ricketts PT  Physical Therapist

## 2025-07-28 ENCOUNTER — TREATMENT (OUTPATIENT)
Dept: PHYSICAL THERAPY | Facility: CLINIC | Age: 65
End: 2025-07-28
Payer: OTHER MISCELLANEOUS

## 2025-07-28 DIAGNOSIS — M54.50 CHRONIC RIGHT-SIDED LOW BACK PAIN, UNSPECIFIED WHETHER SCIATICA PRESENT: ICD-10-CM

## 2025-07-28 DIAGNOSIS — G89.29 CHRONIC RIGHT-SIDED LOW BACK PAIN, UNSPECIFIED WHETHER SCIATICA PRESENT: ICD-10-CM

## 2025-07-28 DIAGNOSIS — M25.551 PAIN OF RIGHT HIP: Primary | ICD-10-CM

## 2025-07-28 PROCEDURE — 97164 PT RE-EVAL EST PLAN CARE: CPT | Performed by: PHYSICAL THERAPIST

## 2025-07-28 PROCEDURE — 97110 THERAPEUTIC EXERCISES: CPT | Performed by: PHYSICAL THERAPIST

## 2025-07-28 PROCEDURE — 97530 THERAPEUTIC ACTIVITIES: CPT | Performed by: PHYSICAL THERAPIST

## 2025-07-28 NOTE — PROGRESS NOTES
Re-Assessment / Re-Certification      [facility]  [address]  [place] [address]    Patient: Guy Sheth   : 1960  Diagnosis/ICD-10 Code:  Pain of right hip [M25.551]  Referring practitioner: Lee Noel MD  Date of Initial Visit: Type: THERAPY  Noted: 3/27/2025  Today's Date: 2025  Patient seen for 27 sessions      Subjective:     Subjective Questionnaire: LEFS:   Clinical Progress: unchanged  Home Program Compliance: Yes  Treatment has included: therapeutic exercise, neuromuscular re-education, and therapeutic activity    Subjective Evaluation    History of Present Illness  Date of onset: 2025  Mechanism of injury: CLOF: standing limited to 10-15 min before needing to sit for partial R LE symptom relief    Subjective comment: Patient reports aggravating his R hip this weekend. He was performing HEP and was struggling to achieve his normal R neural mobility, so he asked his wife to assist him. When she did, he stretched too much and his R hip has stayed aggravated this then. Sitting  Patient Occupation: , works 2nd shift   Precautions and Work Restrictions: nonePain  Current pain ratin  At best pain ratin  At worst pain ratin         Objective          Palpation     Right   Hypertonic in the quadratus lumborum. Tenderness of the quadratus lumborum.     Additional Palpation Details  Joint testing: PA pressure to L4/5 and L5/S1 reproduced patient's main c/o of R hip/gluteal pain (worse at L5/S1)    Tenderness     Right Hip   Tenderness in the greater trochanter (soreness).     Neurological Testing     Sensation     Lumbar   Left   Intact: light touch    Comments   Right light touch: decreased L2, L4    Additional Neurological Details  Slump (+) on R    Active Range of Motion   Left Knee   Flexion: WFL and with pain  Extension: WFL    Right Knee   Flexion: WFL  Extension: WFL    Additional Active Range of Motion  Details  Flexion: touches knee, R back/hip pain  Extension: 50%, R back/hip pain    Passive Range of Motion   Left Hip   Flexion: 100 (R hip/low back pain) degrees   External rotation (90/90): WFL  Internal rotation (90/90): WFL    Right Hip   Flexion: 110 (R low back/hip pain) degrees   Extension: 0 (R low back/hip pain) degrees   External rotation (90/90): 45 (R low back/hip pain) degrees   Internal rotation (90/90): 15 (R low back/hip pain) degrees   Left Knee   Flexion: WFL and with pain  Extension: WFL    Right Knee   Flexion: WFL  Extension: WFL    Strength/Myotome Testing     Left Hip   Planes of Motion   Flexion: 4+  Left hip extension strength: R low back/hip pain with AROM  in testing position so DNT strength.  Abduction: 4 (R hip/low back pain)    Right Hip   Planes of Motion   Right hip flexors strength: R hip/low back with AROM so DNT.  Right hip extension strength: R low back/hip pain with AROM in testing position so DNT strength.  Abduction: 4 (R low back/hip pain)    Left Knee   Flexion: 4+  Extension: 5  Quadriceps contraction: good    Right Knee   Flexion: 4+ (R low back/hip pain)  Extension: 5    Tests     Right Hip FADIR test: painful but did not reproduce his main c/o of R posterior hip pain.        Assessment & Plan       Assessment  Impairments: abnormal coordination, abnormal muscle firing, abnormal or restricted ROM, activity intolerance, impaired physical strength and pain with function   Functional limitations: carrying objects, lifting, sleeping, walking, uncomfortable because of pain, moving in bed and standing   Assessment details: Patient is a 65-year-old male who presents with right hip pain radiating to his distal knee, with distal numbness/tingling, after slip and fall injury on 1/13.  RLE symptoms are exacerbated by prolonged standing/walking and alleviated with sitting/forward bending.  Right slump test is positive.  He demonstrates weakness with right hip flexion, right knee  extension, and right ankle dorsiflexion.  Right L4 sensation is diminished.  Increased pain reported with right hip PROM flexion and extension.    4/24- Patient reporting functional and symptomatic improvements related to his low back and right lower extremity.  Walking tolerance is improved from 10 minutes to greater than 30 minutes.  Right lower extremity strength continues to improve, though right hip PROM was decreased and more painful today.  He continues to report symptoms radiating below his knee and into his ankle, mostly tingling.    5/27- Patient demonstrating improved R hip strength and PROM while reporting modest symptomatic and functional improvements. He continues to await authorization for R hip MRI but is progressing with PT.    6/30- Patient reporting no significant change over the past month.  Testing of R hip, L hip, and lumbar spine (including slump test) all reproduced main c/o R gluteal pain. B hip PROM has slightly declined over the past month. Will continue PT.    7/28- Patient demonstrating decline in objective measures compared to 1 month ago, but he is experiencing symptom exacerbation. Testing for both R hip and lumbar spine continue to reproduce his main c/o of symptoms.   Barriers to therapy: Chronicity, comorbidities  Prognosis: good    Goals  Plan Goals: Short Term Goals (4 weeks)  1. Patient to be compliant with initial HEP.  2.  Patient to report walking >20 minutes without increased pain.  3.  Patient to improve LEFS to >40/80.    Long Term Goals (8 weeks)  1. Patient to report walking >40 minutes without increased pain.  2. Patient to improve LEFS to >46/80.  3. Patient to demonstrate independence with home exercises.    New goal (4 weeks)  1. Patient to report walking >60 minutes without increased pain.  2. Patient to improve LEFS to >50/80.    Plan  Therapy options: will be seen for skilled therapy services  Planned modality interventions: cryotherapy, TENS, thermotherapy  (hydrocollator packs), dry needling, traction, iontophoresis and ultrasound  Planned therapy interventions: abdominal trunk stabilization, body mechanics training, functional ROM exercises, home exercise program, manual therapy, motor coordination training, neuromuscular re-education, soft tissue mobilization, strengthening, therapeutic activities, ADL retraining, postural training, spinal/joint mobilization, stretching, joint mobilization, gait training and balance/weight-bearing training  Frequency: 2x week  Duration in weeks: 4  Treatment plan discussed with: patient      Progress toward previous goals: Partially Met        Timeframe: 1 month    PT Signature: Alfonso Ricketts, PT  PT License [id number]    Based upon review of the patient's progress and continued therapy plan, it is my medical opinion that Guy Sheth should continue physical therapy treatment at [facility]  [address] [place] [address] [phone].    Signature: __________________________________  Lee Noel MD    Timed:  Manual Therapy:    0     mins  19847;  Therapeutic Exercise:    25     mins  37804;     Neuromuscular Arturo:    0    mins  87393;    Therapeutic Activity:     10     mins  61278;     Gait Trainin     mins  72267;     Ultrasound:     0     mins  06161;    Self Care Trainin     mins  15810;    Untimed:  Electrical Stimulation:    0     mins  72545 ( );  Mechanical Traction:    0     mins  25241;   Dry Needlin     mins   Re-evaluation                15      mins  88168;  Group Therapy              0      mins  65388;    Timed Treatment:   35   mins   Total Treatment:     50   mins

## 2025-07-31 ENCOUNTER — TREATMENT (OUTPATIENT)
Dept: PHYSICAL THERAPY | Facility: CLINIC | Age: 65
End: 2025-07-31
Payer: OTHER MISCELLANEOUS

## 2025-07-31 DIAGNOSIS — E78.5 DYSLIPIDEMIA: ICD-10-CM

## 2025-07-31 DIAGNOSIS — M54.50 CHRONIC RIGHT-SIDED LOW BACK PAIN, UNSPECIFIED WHETHER SCIATICA PRESENT: ICD-10-CM

## 2025-07-31 DIAGNOSIS — G89.29 CHRONIC RIGHT-SIDED LOW BACK PAIN, UNSPECIFIED WHETHER SCIATICA PRESENT: ICD-10-CM

## 2025-07-31 DIAGNOSIS — M25.551 PAIN OF RIGHT HIP: Primary | ICD-10-CM

## 2025-07-31 DIAGNOSIS — I10 ESSENTIAL HYPERTENSION: ICD-10-CM

## 2025-07-31 PROCEDURE — 97112 NEUROMUSCULAR REEDUCATION: CPT | Performed by: PHYSICAL THERAPIST

## 2025-07-31 PROCEDURE — 97110 THERAPEUTIC EXERCISES: CPT | Performed by: PHYSICAL THERAPIST

## 2025-07-31 RX ORDER — ROSUVASTATIN CALCIUM 20 MG/1
20 TABLET, FILM COATED ORAL DAILY
Qty: 90 TABLET | Refills: 3 | Status: SHIPPED | OUTPATIENT
Start: 2025-07-31

## 2025-07-31 NOTE — PROGRESS NOTES
Physical Therapy Daily Progress Note    [facility]  [address]  [place] [address]      Patient: Guy Sheth   : 1960  Diagnosis/ICD-10 Code:  Pain of right hip [M25.551]  Referring practitioner: Lee Noel MD  Date of Initial Visit: Type: THERAPY  Noted: 3/27/2025  Today's Date: 2025  Patient seen for 28 sessions           Subjective   Patient reports his R low back/hip was aggravated and has stayed hurting worse since having to repeatedly use a crank miles at work 3 nights ago. No radiating R LE symptoms.    Objective   See Exercise, Manual, and Modality Logs for complete treatment.       Assessment/Plan  Resumed patient's program, with which he reported less pain compared to last visit.    Patient's referring provider was contacted regarding next steps. Patient to contact ortho to schedule follow up.        Timed:  Manual Therapy:    0     mins  34993;  Therapeutic Exercise:    25     mins  27174;     Neuromuscular Arturo:   23    mins  67827;    Therapeutic Activity:     0     mins  09473;     Gait Trainin     mins  80769;     Ultrasound:     0     mins  21099;    Self Care Trainin     mins  31860;    Untimed:  Electrical Stimulation:    0     mins  51838 ( );  Mechanical Traction:    0     mins  38411;   Dry Needlin     mins   Re-evaluation                0      mins  48141;  Group Therapy              0      mins  41080;    Timed Treatment:   48   mins   Total Treatment:     48   mins    Alfonso Ricketts PT  Physical Therapist

## 2025-08-04 ENCOUNTER — OFFICE VISIT (OUTPATIENT)
Age: 65
End: 2025-08-04
Payer: OTHER MISCELLANEOUS

## 2025-08-04 ENCOUNTER — TREATMENT (OUTPATIENT)
Dept: PHYSICAL THERAPY | Facility: CLINIC | Age: 65
End: 2025-08-04
Payer: OTHER MISCELLANEOUS

## 2025-08-04 VITALS
WEIGHT: 208 LBS | BODY MASS INDEX: 29.78 KG/M2 | DIASTOLIC BLOOD PRESSURE: 70 MMHG | SYSTOLIC BLOOD PRESSURE: 110 MMHG | HEIGHT: 70 IN

## 2025-08-04 DIAGNOSIS — G89.29 CHRONIC RIGHT-SIDED LOW BACK PAIN, UNSPECIFIED WHETHER SCIATICA PRESENT: ICD-10-CM

## 2025-08-04 DIAGNOSIS — M19.011 ARTHRITIS OF RIGHT GLENOHUMERAL JOINT: Primary | ICD-10-CM

## 2025-08-04 DIAGNOSIS — M54.16 LUMBAR RADICULOPATHY: ICD-10-CM

## 2025-08-04 DIAGNOSIS — M51.362 DEGENERATION OF INTERVERTEBRAL DISC OF LUMBAR REGION WITH DISCOGENIC BACK PAIN AND LOWER EXTREMITY PAIN: ICD-10-CM

## 2025-08-04 DIAGNOSIS — M54.50 CHRONIC RIGHT-SIDED LOW BACK PAIN, UNSPECIFIED WHETHER SCIATICA PRESENT: ICD-10-CM

## 2025-08-04 DIAGNOSIS — M25.551 PAIN OF RIGHT HIP: Primary | ICD-10-CM

## 2025-08-04 PROCEDURE — 97112 NEUROMUSCULAR REEDUCATION: CPT | Performed by: PHYSICAL THERAPIST

## 2025-08-04 PROCEDURE — 97140 MANUAL THERAPY 1/> REGIONS: CPT | Performed by: PHYSICAL THERAPIST

## 2025-08-04 PROCEDURE — 97530 THERAPEUTIC ACTIVITIES: CPT | Performed by: PHYSICAL THERAPIST

## 2025-08-04 PROCEDURE — 97110 THERAPEUTIC EXERCISES: CPT | Performed by: PHYSICAL THERAPIST

## 2025-08-11 ENCOUNTER — TREATMENT (OUTPATIENT)
Dept: PHYSICAL THERAPY | Facility: CLINIC | Age: 65
End: 2025-08-11
Payer: OTHER MISCELLANEOUS

## 2025-08-11 DIAGNOSIS — G89.29 CHRONIC RIGHT-SIDED LOW BACK PAIN, UNSPECIFIED WHETHER SCIATICA PRESENT: ICD-10-CM

## 2025-08-11 DIAGNOSIS — M54.50 CHRONIC RIGHT-SIDED LOW BACK PAIN, UNSPECIFIED WHETHER SCIATICA PRESENT: ICD-10-CM

## 2025-08-11 DIAGNOSIS — M25.511 CHRONIC RIGHT SHOULDER PAIN: Primary | ICD-10-CM

## 2025-08-11 DIAGNOSIS — G89.29 CHRONIC RIGHT SHOULDER PAIN: Primary | ICD-10-CM

## 2025-08-11 DIAGNOSIS — M25.551 PAIN OF RIGHT HIP: ICD-10-CM

## 2025-08-11 PROCEDURE — 97164 PT RE-EVAL EST PLAN CARE: CPT | Performed by: PHYSICAL THERAPIST

## 2025-08-11 PROCEDURE — 97140 MANUAL THERAPY 1/> REGIONS: CPT | Performed by: PHYSICAL THERAPIST

## 2025-08-11 PROCEDURE — 97110 THERAPEUTIC EXERCISES: CPT | Performed by: PHYSICAL THERAPIST

## 2025-08-11 PROCEDURE — 97530 THERAPEUTIC ACTIVITIES: CPT | Performed by: PHYSICAL THERAPIST

## 2025-08-12 ENCOUNTER — TELEPHONE (OUTPATIENT)
Dept: PAIN MEDICINE | Facility: CLINIC | Age: 65
End: 2025-08-12
Payer: COMMERCIAL

## 2025-08-14 ENCOUNTER — TREATMENT (OUTPATIENT)
Dept: PHYSICAL THERAPY | Facility: CLINIC | Age: 65
End: 2025-08-14
Payer: OTHER MISCELLANEOUS

## 2025-08-14 ENCOUNTER — OFFICE VISIT (OUTPATIENT)
Age: 65
End: 2025-08-14
Payer: OTHER MISCELLANEOUS

## 2025-08-14 DIAGNOSIS — M19.011 ARTHRITIS OF RIGHT GLENOHUMERAL JOINT: Primary | ICD-10-CM

## 2025-08-14 DIAGNOSIS — G89.29 CHRONIC RIGHT-SIDED LOW BACK PAIN, UNSPECIFIED WHETHER SCIATICA PRESENT: ICD-10-CM

## 2025-08-14 DIAGNOSIS — M25.511 CHRONIC RIGHT SHOULDER PAIN: Primary | ICD-10-CM

## 2025-08-14 DIAGNOSIS — G89.29 CHRONIC RIGHT SHOULDER PAIN: Primary | ICD-10-CM

## 2025-08-14 DIAGNOSIS — M54.50 CHRONIC RIGHT-SIDED LOW BACK PAIN, UNSPECIFIED WHETHER SCIATICA PRESENT: ICD-10-CM

## 2025-08-14 DIAGNOSIS — M25.551 PAIN OF RIGHT HIP: ICD-10-CM

## 2025-08-14 RX ORDER — BUPIVACAINE HYDROCHLORIDE 5 MG/ML
2 INJECTION, SOLUTION EPIDURAL; INTRACAUDAL; PERINEURAL
Status: COMPLETED | OUTPATIENT
Start: 2025-08-14 | End: 2025-08-14

## 2025-08-14 RX ORDER — DEXAMETHASONE SODIUM PHOSPHATE 4 MG/ML
8 INJECTION, SOLUTION INTRA-ARTICULAR; INTRALESIONAL; INTRAMUSCULAR; INTRAVENOUS; SOFT TISSUE
Status: COMPLETED | OUTPATIENT
Start: 2025-08-14 | End: 2025-08-14

## 2025-08-14 RX ORDER — LIDOCAINE HYDROCHLORIDE 10 MG/ML
2 INJECTION, SOLUTION EPIDURAL; INFILTRATION; INTRACAUDAL; PERINEURAL
Status: COMPLETED | OUTPATIENT
Start: 2025-08-14 | End: 2025-08-14

## 2025-08-14 RX ADMIN — LIDOCAINE HYDROCHLORIDE 2 ML: 10 INJECTION, SOLUTION EPIDURAL; INFILTRATION; INTRACAUDAL; PERINEURAL at 13:50

## 2025-08-14 RX ADMIN — BUPIVACAINE HYDROCHLORIDE 2 ML: 5 INJECTION, SOLUTION EPIDURAL; INTRACAUDAL; PERINEURAL at 13:50

## 2025-08-14 RX ADMIN — DEXAMETHASONE SODIUM PHOSPHATE 8 MG: 4 INJECTION, SOLUTION INTRA-ARTICULAR; INTRALESIONAL; INTRAMUSCULAR; INTRAVENOUS; SOFT TISSUE at 13:50

## 2025-08-18 ENCOUNTER — TREATMENT (OUTPATIENT)
Dept: PHYSICAL THERAPY | Facility: CLINIC | Age: 65
End: 2025-08-18
Payer: OTHER MISCELLANEOUS

## 2025-08-18 DIAGNOSIS — G89.29 CHRONIC RIGHT-SIDED LOW BACK PAIN, UNSPECIFIED WHETHER SCIATICA PRESENT: ICD-10-CM

## 2025-08-18 DIAGNOSIS — G89.29 CHRONIC RIGHT SHOULDER PAIN: Primary | ICD-10-CM

## 2025-08-18 DIAGNOSIS — M54.50 CHRONIC RIGHT-SIDED LOW BACK PAIN, UNSPECIFIED WHETHER SCIATICA PRESENT: ICD-10-CM

## 2025-08-18 DIAGNOSIS — M25.511 CHRONIC RIGHT SHOULDER PAIN: Primary | ICD-10-CM

## 2025-08-18 DIAGNOSIS — M25.551 PAIN OF RIGHT HIP: ICD-10-CM

## 2025-08-18 PROCEDURE — 97110 THERAPEUTIC EXERCISES: CPT | Performed by: PHYSICAL THERAPIST

## 2025-08-18 PROCEDURE — 97112 NEUROMUSCULAR REEDUCATION: CPT | Performed by: PHYSICAL THERAPIST

## 2025-08-18 PROCEDURE — 97530 THERAPEUTIC ACTIVITIES: CPT | Performed by: PHYSICAL THERAPIST

## 2025-08-18 PROCEDURE — 97140 MANUAL THERAPY 1/> REGIONS: CPT | Performed by: PHYSICAL THERAPIST

## 2025-08-25 ENCOUNTER — TREATMENT (OUTPATIENT)
Dept: PHYSICAL THERAPY | Facility: CLINIC | Age: 65
End: 2025-08-25
Payer: OTHER MISCELLANEOUS

## 2025-08-25 DIAGNOSIS — M25.551 PAIN OF RIGHT HIP: ICD-10-CM

## 2025-08-25 DIAGNOSIS — G89.29 CHRONIC RIGHT SHOULDER PAIN: Primary | ICD-10-CM

## 2025-08-25 DIAGNOSIS — M25.511 CHRONIC RIGHT SHOULDER PAIN: Primary | ICD-10-CM

## 2025-08-25 DIAGNOSIS — G89.29 CHRONIC RIGHT-SIDED LOW BACK PAIN, UNSPECIFIED WHETHER SCIATICA PRESENT: ICD-10-CM

## 2025-08-25 DIAGNOSIS — M54.50 CHRONIC RIGHT-SIDED LOW BACK PAIN, UNSPECIFIED WHETHER SCIATICA PRESENT: ICD-10-CM

## 2025-08-25 PROCEDURE — 97112 NEUROMUSCULAR REEDUCATION: CPT | Performed by: PHYSICAL THERAPIST

## 2025-08-25 PROCEDURE — 97530 THERAPEUTIC ACTIVITIES: CPT | Performed by: PHYSICAL THERAPIST

## 2025-08-25 PROCEDURE — 97110 THERAPEUTIC EXERCISES: CPT | Performed by: PHYSICAL THERAPIST

## 2025-08-25 PROCEDURE — 97140 MANUAL THERAPY 1/> REGIONS: CPT | Performed by: PHYSICAL THERAPIST

## 2025-08-28 ENCOUNTER — TREATMENT (OUTPATIENT)
Dept: PHYSICAL THERAPY | Facility: CLINIC | Age: 65
End: 2025-08-28
Payer: OTHER MISCELLANEOUS

## 2025-08-28 DIAGNOSIS — M54.50 CHRONIC RIGHT-SIDED LOW BACK PAIN, UNSPECIFIED WHETHER SCIATICA PRESENT: ICD-10-CM

## 2025-08-28 DIAGNOSIS — M25.511 CHRONIC RIGHT SHOULDER PAIN: Primary | ICD-10-CM

## 2025-08-28 DIAGNOSIS — G89.29 CHRONIC RIGHT-SIDED LOW BACK PAIN, UNSPECIFIED WHETHER SCIATICA PRESENT: ICD-10-CM

## 2025-08-28 DIAGNOSIS — M25.551 PAIN OF RIGHT HIP: ICD-10-CM

## 2025-08-28 DIAGNOSIS — G89.29 CHRONIC RIGHT SHOULDER PAIN: Primary | ICD-10-CM

## 2025-08-28 PROCEDURE — 97164 PT RE-EVAL EST PLAN CARE: CPT | Performed by: PHYSICAL THERAPIST

## 2025-08-28 PROCEDURE — 97110 THERAPEUTIC EXERCISES: CPT | Performed by: PHYSICAL THERAPIST

## 2025-08-28 PROCEDURE — 97530 THERAPEUTIC ACTIVITIES: CPT | Performed by: PHYSICAL THERAPIST
